# Patient Record
Sex: FEMALE | Race: WHITE | Employment: OTHER | URBAN - METROPOLITAN AREA
[De-identification: names, ages, dates, MRNs, and addresses within clinical notes are randomized per-mention and may not be internally consistent; named-entity substitution may affect disease eponyms.]

---

## 2017-04-10 ENCOUNTER — HOSPITAL ENCOUNTER (OUTPATIENT)
Age: 72
Setting detail: SPECIMEN
Discharge: HOME | End: 2017-04-10
Payer: MEDICARE

## 2017-04-10 ENCOUNTER — OFFICE VISIT (OUTPATIENT)
Dept: INTERNAL MEDICINE | Age: 72
End: 2017-04-10

## 2017-04-10 VITALS
HEART RATE: 90 BPM | BODY MASS INDEX: 47.09 KG/M2 | DIASTOLIC BLOOD PRESSURE: 88 MMHG | HEIGHT: 66 IN | SYSTOLIC BLOOD PRESSURE: 138 MMHG | WEIGHT: 293 LBS

## 2017-04-10 DIAGNOSIS — I10 ESSENTIAL HYPERTENSION: ICD-10-CM

## 2017-04-10 DIAGNOSIS — E55.9 VITAMIN D DEFICIENCY: ICD-10-CM

## 2017-04-10 DIAGNOSIS — I10 ESSENTIAL HYPERTENSION: Primary | ICD-10-CM

## 2017-04-10 DIAGNOSIS — E78.5 DYSLIPIDEMIA: ICD-10-CM

## 2017-04-10 LAB
ALBUMIN SERPL-MCNC: 4.4 G/DL (ref 3.9–4.9)
ALP BLD-CCNC: 74 U/L (ref 40–130)
ALT SERPL-CCNC: 53 U/L (ref 0–33)
ANION GAP SERPL CALCULATED.3IONS-SCNC: 16 MEQ/L (ref 7–13)
AST SERPL-CCNC: 55 U/L (ref 0–35)
BASOPHILS ABSOLUTE: 0.1 K/UL (ref 0–0.2)
BASOPHILS RELATIVE PERCENT: 0.6 %
BILIRUB SERPL-MCNC: 0.7 MG/DL (ref 0–1.2)
BUN BLDV-MCNC: 32 MG/DL (ref 8–23)
CALCIUM SERPL-MCNC: 10.2 MG/DL (ref 8.6–10.2)
CHLORIDE BLD-SCNC: 102 MEQ/L (ref 98–107)
CHOLESTEROL, TOTAL: 243 MG/DL (ref 0–199)
CO2: 23 MEQ/L (ref 22–29)
CREAT SERPL-MCNC: 1.18 MG/DL (ref 0.5–0.9)
EOSINOPHILS ABSOLUTE: 0.2 K/UL (ref 0–0.7)
EOSINOPHILS RELATIVE PERCENT: 1.5 %
GFR AFRICAN AMERICAN: 54.5
GFR NON-AFRICAN AMERICAN: 45
GLOBULIN: 3.1 G/DL (ref 2.3–3.5)
GLUCOSE BLD-MCNC: 81 MG/DL (ref 74–109)
HCT VFR BLD CALC: 47.4 % (ref 37–47)
HDLC SERPL-MCNC: 68 MG/DL (ref 40–59)
HEMOGLOBIN: 15.3 G/DL (ref 12–16)
LDL CHOLESTEROL CALCULATED: 132 MG/DL (ref 0–129)
LYMPHOCYTES ABSOLUTE: 1.9 K/UL (ref 1–4.8)
LYMPHOCYTES RELATIVE PERCENT: 18.6 %
MCH RBC QN AUTO: 32.5 PG (ref 27–31.3)
MCHC RBC AUTO-ENTMCNC: 32.3 % (ref 33–37)
MCV RBC AUTO: 100.5 FL (ref 82–100)
MONOCYTES ABSOLUTE: 0.7 K/UL (ref 0.2–0.8)
MONOCYTES RELATIVE PERCENT: 7.2 %
NEUTROPHILS ABSOLUTE: 7.4 K/UL (ref 1.4–6.5)
NEUTROPHILS RELATIVE PERCENT: 72.1 %
PDW BLD-RTO: 13.7 % (ref 11.5–14.5)
PLATELET # BLD: 232 K/UL (ref 130–400)
POTASSIUM SERPL-SCNC: 4.8 MEQ/L (ref 3.5–5.1)
RBC # BLD: 4.71 M/UL (ref 4.2–5.4)
SLIDE REVIEW: ABNORMAL
SODIUM BLD-SCNC: 141 MEQ/L (ref 132–144)
TOTAL PROTEIN: 7.5 G/DL (ref 6.4–8.1)
TRIGL SERPL-MCNC: 217 MG/DL (ref 0–200)
VITAMIN D 25-HYDROXY: 28.1 NG/ML (ref 30–100)
WBC # BLD: 10.3 K/UL (ref 4.8–10.8)

## 2017-04-10 PROCEDURE — 82306 VITAMIN D 25 HYDROXY: CPT

## 2017-04-10 PROCEDURE — G8399 PT W/DXA RESULTS DOCUMENT: HCPCS | Performed by: PHYSICIAN ASSISTANT

## 2017-04-10 PROCEDURE — 1090F PRES/ABSN URINE INCON ASSESS: CPT | Performed by: PHYSICIAN ASSISTANT

## 2017-04-10 PROCEDURE — 80053 COMPREHEN METABOLIC PANEL: CPT

## 2017-04-10 PROCEDURE — 4040F PNEUMOC VAC/ADMIN/RCVD: CPT | Performed by: PHYSICIAN ASSISTANT

## 2017-04-10 PROCEDURE — 85025 COMPLETE CBC W/AUTO DIFF WBC: CPT

## 2017-04-10 PROCEDURE — 1123F ACP DISCUSS/DSCN MKR DOCD: CPT | Performed by: PHYSICIAN ASSISTANT

## 2017-04-10 PROCEDURE — 3014F SCREEN MAMMO DOC REV: CPT | Performed by: PHYSICIAN ASSISTANT

## 2017-04-10 PROCEDURE — 99213 OFFICE O/P EST LOW 20 MIN: CPT | Performed by: PHYSICIAN ASSISTANT

## 2017-04-10 PROCEDURE — 80061 LIPID PANEL: CPT

## 2017-04-10 PROCEDURE — G8427 DOCREV CUR MEDS BY ELIG CLIN: HCPCS | Performed by: PHYSICIAN ASSISTANT

## 2017-04-10 PROCEDURE — 3017F COLORECTAL CA SCREEN DOC REV: CPT | Performed by: PHYSICIAN ASSISTANT

## 2017-04-10 PROCEDURE — 1036F TOBACCO NON-USER: CPT | Performed by: PHYSICIAN ASSISTANT

## 2017-04-10 PROCEDURE — G8417 CALC BMI ABV UP PARAM F/U: HCPCS | Performed by: PHYSICIAN ASSISTANT

## 2017-04-10 ASSESSMENT — ENCOUNTER SYMPTOMS
RHINORRHEA: 1
COUGH: 0
ABDOMINAL PAIN: 0
SHORTNESS OF BREATH: 0

## 2017-05-03 ENCOUNTER — TELEPHONE (OUTPATIENT)
Dept: FAMILY MEDICINE CLINIC | Age: 72
End: 2017-05-03

## 2017-05-16 ENCOUNTER — TELEPHONE (OUTPATIENT)
Dept: INTERNAL MEDICINE | Age: 72
End: 2017-05-16

## 2017-05-26 ENCOUNTER — APPOINTMENT (OUTPATIENT)
Dept: GENERAL RADIOLOGY | Age: 72
End: 2017-05-26
Payer: MEDICARE

## 2017-05-26 ENCOUNTER — HOSPITAL ENCOUNTER (EMERGENCY)
Age: 72
Discharge: HOME | End: 2017-05-26
Attending: EMERGENCY MEDICINE
Payer: MEDICARE

## 2017-05-26 VITALS
RESPIRATION RATE: 16 BRPM | HEIGHT: 66 IN | SYSTOLIC BLOOD PRESSURE: 188 MMHG | OXYGEN SATURATION: 98 % | TEMPERATURE: 97.7 F | BODY MASS INDEX: 47.09 KG/M2 | DIASTOLIC BLOOD PRESSURE: 80 MMHG | WEIGHT: 293 LBS | HEART RATE: 77 BPM

## 2017-05-26 DIAGNOSIS — S92.345A CLOSED NONDISPLACED FRACTURE OF FOURTH METATARSAL BONE OF LEFT FOOT, INITIAL ENCOUNTER: Primary | ICD-10-CM

## 2017-05-26 PROCEDURE — 73630 X-RAY EXAM OF FOOT: CPT

## 2017-05-26 PROCEDURE — 99283 EMERGENCY DEPT VISIT LOW MDM: CPT

## 2017-05-26 RX ORDER — TRAMADOL HYDROCHLORIDE 50 MG/1
50 TABLET ORAL EVERY 8 HOURS PRN
Qty: 20 TABLET | Refills: 0 | Status: SHIPPED | OUTPATIENT
Start: 2017-05-26 | End: 2017-06-05

## 2017-05-26 ASSESSMENT — ENCOUNTER SYMPTOMS
CHOKING: 0
SORE THROAT: 0
EYE REDNESS: 0
VOICE CHANGE: 0
BLOOD IN STOOL: 0
STRIDOR: 0
CONSTIPATION: 0
TROUBLE SWALLOWING: 0
WHEEZING: 0
DIARRHEA: 0
BACK PAIN: 0
EYE DISCHARGE: 0
SINUS PRESSURE: 0
SHORTNESS OF BREATH: 0
CHEST TIGHTNESS: 0
ABDOMINAL PAIN: 0
VOMITING: 0
FACIAL SWELLING: 0
EYE PAIN: 0
COUGH: 0

## 2017-05-26 ASSESSMENT — PAIN DESCRIPTION - FREQUENCY: FREQUENCY: CONTINUOUS

## 2017-05-26 ASSESSMENT — PAIN DESCRIPTION - ONSET: ONSET: SUDDEN

## 2017-05-26 ASSESSMENT — PAIN DESCRIPTION - LOCATION: LOCATION: FOOT

## 2017-05-26 ASSESSMENT — PAIN DESCRIPTION - ORIENTATION: ORIENTATION: LEFT

## 2017-05-26 ASSESSMENT — PAIN SCALES - GENERAL: PAINLEVEL_OUTOF10: 9

## 2017-12-19 ENCOUNTER — TELEPHONE (OUTPATIENT)
Dept: ADMINISTRATIVE | Age: 72
End: 2017-12-19

## 2018-01-02 RX ORDER — LEVALBUTEROL TARTRATE 45 MCG
HFA AEROSOL WITH ADAPTER (GRAM) INHALATION
Qty: 30 G | OUTPATIENT
Start: 2018-01-02

## 2018-08-07 ENCOUNTER — HOSPITAL ENCOUNTER (OUTPATIENT)
Dept: LAB | Age: 73
Discharge: HOME | End: 2018-08-07
Payer: MEDICARE

## 2018-08-07 LAB
ALBUMIN SERPL-MCNC: 4.1 G/DL (ref 3.9–4.9)
ANION GAP SERPL CALCULATED.3IONS-SCNC: 16 MEQ/L (ref 7–13)
BACTERIA: NORMAL /HPF
BILIRUBIN URINE: NEGATIVE
BLOOD, URINE: NEGATIVE
BUN BLDV-MCNC: 33 MG/DL (ref 8–23)
CALCIUM SERPL-MCNC: 9.8 MG/DL (ref 8.6–10.2)
CHLORIDE BLD-SCNC: 98 MEQ/L (ref 98–107)
CLARITY: CLEAR
CO2: 23 MEQ/L (ref 22–29)
COLOR: YELLOW
CREAT SERPL-MCNC: 1.31 MG/DL (ref 0.5–0.9)
CREATININE URINE: 79.5 MG/DL
GFR AFRICAN AMERICAN: 48.1
GFR NON-AFRICAN AMERICAN: 39.7
GLUCOSE BLD-MCNC: 93 MG/DL (ref 74–109)
GLUCOSE URINE: NEGATIVE MG/DL
KETONES, URINE: NEGATIVE MG/DL
LEUKOCYTE ESTERASE, URINE: ABNORMAL
NITRITE, URINE: NEGATIVE
PARATHYROID HORMONE INTACT: 40.4 PG/ML (ref 15–65)
PH UA: 6 (ref 5–9)
PHOSPHORUS: 4 MG/DL (ref 2.5–4.5)
POTASSIUM SERPL-SCNC: 4.6 MEQ/L (ref 3.5–5.1)
PROTEIN PROTEIN: 17 MG/DL
PROTEIN UA: NEGATIVE MG/DL
PROTEIN/CREAT RATIO: 0.2 ML/ML
PROTEIN/CREAT RATIO: 0.2 ML/ML (ref 0–0.2)
RBC UA: NORMAL /HPF (ref 0–2)
SODIUM BLD-SCNC: 137 MEQ/L (ref 132–144)
SPECIFIC GRAVITY UA: 1.01 (ref 1–1.03)
UROBILINOGEN, URINE: 0.2 E.U./DL
VITAMIN D 25-HYDROXY: 27.3 NG/ML (ref 30–100)
WBC UA: NORMAL /HPF (ref 0–5)

## 2018-08-07 PROCEDURE — 84156 ASSAY OF PROTEIN URINE: CPT

## 2018-08-07 PROCEDURE — 82306 VITAMIN D 25 HYDROXY: CPT

## 2018-08-07 PROCEDURE — 81001 URINALYSIS AUTO W/SCOPE: CPT

## 2018-08-07 PROCEDURE — 80069 RENAL FUNCTION PANEL: CPT

## 2018-08-07 PROCEDURE — 36415 COLL VENOUS BLD VENIPUNCTURE: CPT

## 2018-08-07 PROCEDURE — 83970 ASSAY OF PARATHORMONE: CPT

## 2019-03-29 ENCOUNTER — HOSPITAL ENCOUNTER (EMERGENCY)
Age: 74
Discharge: HOME | End: 2019-03-29
Attending: EMERGENCY MEDICINE
Payer: COMMERCIAL

## 2019-03-29 ENCOUNTER — APPOINTMENT (OUTPATIENT)
Dept: GENERAL RADIOLOGY | Age: 74
End: 2019-03-29
Payer: COMMERCIAL

## 2019-03-29 VITALS
SYSTOLIC BLOOD PRESSURE: 144 MMHG | HEART RATE: 84 BPM | DIASTOLIC BLOOD PRESSURE: 72 MMHG | TEMPERATURE: 98.1 F | OXYGEN SATURATION: 96 % | RESPIRATION RATE: 18 BRPM | BODY MASS INDEX: 45 KG/M2 | HEIGHT: 66 IN | WEIGHT: 280 LBS

## 2019-03-29 DIAGNOSIS — R11.2 NON-INTRACTABLE VOMITING WITH NAUSEA, UNSPECIFIED VOMITING TYPE: ICD-10-CM

## 2019-03-29 DIAGNOSIS — J10.1 INFLUENZA A: Primary | ICD-10-CM

## 2019-03-29 LAB
ALBUMIN SERPL-MCNC: 4.2 G/DL (ref 3.5–4.6)
ALP BLD-CCNC: 63 U/L (ref 40–130)
ALT SERPL-CCNC: 34 U/L (ref 0–33)
ANION GAP SERPL CALCULATED.3IONS-SCNC: 19 MEQ/L (ref 9–15)
AST SERPL-CCNC: 32 U/L (ref 0–35)
BASOPHILS ABSOLUTE: 0 K/UL (ref 0–0.2)
BASOPHILS RELATIVE PERCENT: 0.2 %
BILIRUB SERPL-MCNC: 0.9 MG/DL (ref 0.2–0.7)
BUN BLDV-MCNC: 16 MG/DL (ref 8–23)
CALCIUM SERPL-MCNC: 9.7 MG/DL (ref 8.5–9.9)
CHLORIDE BLD-SCNC: 92 MEQ/L (ref 95–107)
CO2: 22 MEQ/L (ref 20–31)
CREAT SERPL-MCNC: 1.07 MG/DL (ref 0.5–0.9)
EOSINOPHILS ABSOLUTE: 0 K/UL (ref 0–0.7)
EOSINOPHILS RELATIVE PERCENT: 0 %
GFR AFRICAN AMERICAN: >60
GFR NON-AFRICAN AMERICAN: 50.1
GLOBULIN: 3.3 G/DL (ref 2.3–3.5)
GLUCOSE BLD-MCNC: 160 MG/DL (ref 70–99)
HCT VFR BLD CALC: 39.9 % (ref 37–47)
HEMOGLOBIN: 13.6 G/DL (ref 12–16)
LYMPHOCYTES ABSOLUTE: 0.6 K/UL (ref 1–4.8)
LYMPHOCYTES RELATIVE PERCENT: 5.3 %
MCH RBC QN AUTO: 32.5 PG (ref 27–31.3)
MCHC RBC AUTO-ENTMCNC: 34.2 % (ref 33–37)
MCV RBC AUTO: 94.9 FL (ref 82–100)
MONOCYTES ABSOLUTE: 0.5 K/UL (ref 0.2–0.8)
MONOCYTES RELATIVE PERCENT: 4.7 %
NEUTROPHILS ABSOLUTE: 9.9 K/UL (ref 1.4–6.5)
NEUTROPHILS RELATIVE PERCENT: 89.8 %
PDW BLD-RTO: 12.6 % (ref 11.5–14.5)
PLATELET # BLD: 152 K/UL (ref 130–400)
POTASSIUM SERPL-SCNC: 3.3 MEQ/L (ref 3.4–4.9)
RAPID INFLUENZA  B AGN: NEGATIVE
RAPID INFLUENZA A AGN: POSITIVE
RBC # BLD: 4.2 M/UL (ref 4.2–5.4)
SODIUM BLD-SCNC: 133 MEQ/L (ref 135–144)
TOTAL PROTEIN: 7.5 G/DL (ref 6.3–8)
WBC # BLD: 11.1 K/UL (ref 4.8–10.8)

## 2019-03-29 PROCEDURE — 85025 COMPLETE CBC W/AUTO DIFF WBC: CPT

## 2019-03-29 PROCEDURE — 80053 COMPREHEN METABOLIC PANEL: CPT

## 2019-03-29 PROCEDURE — 2580000003 HC RX 258: Performed by: EMERGENCY MEDICINE

## 2019-03-29 PROCEDURE — 87804 INFLUENZA ASSAY W/OPTIC: CPT

## 2019-03-29 PROCEDURE — 6360000002 HC RX W HCPCS: Performed by: EMERGENCY MEDICINE

## 2019-03-29 PROCEDURE — 36415 COLL VENOUS BLD VENIPUNCTURE: CPT

## 2019-03-29 PROCEDURE — 96374 THER/PROPH/DIAG INJ IV PUSH: CPT

## 2019-03-29 PROCEDURE — 71046 X-RAY EXAM CHEST 2 VIEWS: CPT

## 2019-03-29 PROCEDURE — 99283 EMERGENCY DEPT VISIT LOW MDM: CPT

## 2019-03-29 PROCEDURE — 6370000000 HC RX 637 (ALT 250 FOR IP): Performed by: EMERGENCY MEDICINE

## 2019-03-29 RX ORDER — 0.9 % SODIUM CHLORIDE 0.9 %
1000 INTRAVENOUS SOLUTION INTRAVENOUS ONCE
Status: COMPLETED | OUTPATIENT
Start: 2019-03-29 | End: 2019-03-29

## 2019-03-29 RX ORDER — BENZONATATE 100 MG/1
100 CAPSULE ORAL 3 TIMES DAILY PRN
COMMUNITY
End: 2019-12-01

## 2019-03-29 RX ORDER — ONDANSETRON 4 MG/1
4 TABLET, ORALLY DISINTEGRATING ORAL 3 TIMES DAILY PRN
Qty: 12 TABLET | Refills: 0 | Status: SHIPPED | OUTPATIENT
Start: 2019-03-29 | End: 2019-04-05 | Stop reason: SDUPTHER

## 2019-03-29 RX ORDER — ACETAMINOPHEN 500 MG
1000 TABLET ORAL ONCE
Status: COMPLETED | OUTPATIENT
Start: 2019-03-29 | End: 2019-03-29

## 2019-03-29 RX ORDER — ONDANSETRON 2 MG/ML
4 INJECTION INTRAMUSCULAR; INTRAVENOUS ONCE
Status: COMPLETED | OUTPATIENT
Start: 2019-03-29 | End: 2019-03-29

## 2019-03-29 RX ADMIN — ONDANSETRON 4 MG: 2 INJECTION INTRAMUSCULAR; INTRAVENOUS at 20:55

## 2019-03-29 RX ADMIN — SODIUM CHLORIDE 1000 ML: 9 INJECTION, SOLUTION INTRAVENOUS at 20:55

## 2019-03-29 RX ADMIN — ACETAMINOPHEN 1000 MG: 500 TABLET ORAL at 22:45

## 2019-03-29 ASSESSMENT — ENCOUNTER SYMPTOMS
NAUSEA: 1
VOMITING: 1
COUGH: 1
ABDOMINAL PAIN: 0
DIARRHEA: 0

## 2019-03-29 ASSESSMENT — PAIN SCALES - GENERAL
PAINLEVEL_OUTOF10: 0
PAINLEVEL_OUTOF10: 4

## 2019-03-30 NOTE — ED PROVIDER NOTES
[facility] ED  eMERGENCY dEPARTMENT eNCOUnter      Pt Name: Nancy Silva  MRN: [id number]  [place] 1945  Date of evaluation: 3/29/2019  Provider: Samuel Reynolds MD    [address] [place] [address]       Chief Complaint   Patient presents with    Nausea     today    Emesis     today    Cough     since Weds         HISTORY OF PRESENT ILLNESS   (Location/Symptom, Timing/Onset,Context/Setting, Quality, Duration, Modifying Factors, Severity)  Note limiting factors. Nancy Silva is a 76 y.o. female who presents to the emergency department with onset of cough 2 days ago. She saw her physician was given Tessalon prescription and following that the next day she started with nausea and vomiting. No abdominal pain. No chest pain. No fever. Some nasal congestion. No diarrhea. This mostly dry but occasionally productive of small amount of yellowish material.  No ill exposures. The history is provided by the patient. NursingNotes were reviewed. REVIEW OF SYSTEMS    (2-9 systems for level 4, 10 or more for level 5)     Review of Systems   Constitutional: Negative for fever. HENT: Positive for congestion. Respiratory: Positive for cough. Cardiovascular: Negative for chest pain. Gastrointestinal: Positive for nausea and vomiting. Negative for abdominal pain and diarrhea. Genitourinary: Negative for dysuria. Skin: Negative for rash. Allergic/Immunologic: Positive for environmental allergies. Psychiatric/Behavioral: The patient is nervous/anxious. Except as noted above the remainder of the review of systems was reviewed and negative.        PAST MEDICAL HISTORY     Past Medical History:   Diagnosis Date    Allergic rhinitis 9/18/2013    Anxiety 9/18/2013    Asthma     Carpal tunnel syndrome 5/19/2015    Cellulitis of toe, right 6/5/2012    Cholelithiasis, common bile duct 1/10/2012    CKD (chronic kidney disease) stage 3, GFR 30-59 ml/min ([facility]) 9/18/2013    Depression     Dyslipidemia 11/26/2011    GERD (gastroesophageal reflux disease)     Gout 10/24/2011    HTN (hypertension) 10/24/2011    Hypertension     Irritable bowel syndrome 11/26/2011    Microscopic hematuria 12/13/2012    Midsternal chest pain     Migraine headache 2/22/2012    Multiple-type hyperlipidemia 5/19/2015    Muscle spasm 10/24/2011    Obesity, Class III, BMI 40-49.9 (morbid obesity) ([facility] [address] 11/26/2011    JARON (obstructive sleep apnea) 9/18/2013    Osteoarthritis     Other activity(E029.9)     sensative to vicryl sutures    Palpitations     Polyarthralgia 10/24/2011    Screening mammogram     Skin cancer 10/10/2016    Splenic mass 12/13/2012         SURGICALHISTORY       Past Surgical History:   Procedure Laterality Date    BUNIONECTOMY      removal    CARDIOVASCULAR STRESS TEST      2011 normal    CHOLECYSTECTOMY      HYSTERECTOMY      SINUS SURGERY      TONSILLECTOMY           CURRENT MEDICATIONS       Previous Medications    AMLODIPINE (NORVASC) 5 MG TABLET        ASCORBIC ACID (VITAMIN C) 500 MG CAPS    Take 1 tablet by mouth 2 times daily     ASPIRIN 81 MG TABLET    Take 81 mg by mouth daily. BENZONATATE (TESSALON) 100 MG CAPSULE    Take 100 mg by mouth 3 times daily as needed for Cough    CARVEDILOL (COREG) 6.25 MG TABLET        CETIRIZINE (ZYRTEC) 10 MG TABLET    Take 1 tablet by mouth daily    CITALOPRAM (CELEXA) 20 MG TABLET    TAKE 1 TABLET BY MOUTH DAILY    COLCHICINE (COLCRYS) 0.6 MG TABLET    Take 0.6 mg by mouth every other day     FEBUXOSTAT 40 MG TABS TABLET    Take 40 mg by mouth daily    FERROUS SULFATE 325 (65 FE) MG TABLET    Take 325 mg by mouth daily (with breakfast). FLUTICASONE (FLONASE) 50 MCG/ACT NASAL SPRAY    use 2 (TWO) sprays nasally daily AS DIRECTED.     FLUTICASONE-SALMETEROL (ADVAIR DISKUS) 500-50 MCG/DOSE DISKUS INHALER    Inhale 1 puff into the lungs 2 times daily    GLUCOSAMINE-CHONDROITIN 500-400 MG CAPS    Take 1 capsule by mouth daily.    LORAZEPAM (ATIVAN) 1 MG TABLET    Take 1 tablet by mouth every 6 hours as needed. LOVASTATIN (MEVACOR) 40 MG TABLET    Take 1 tablet by mouth nightly    MONTELUKAST (SINGULAIR) 10 MG TABLET    Take 1 tablet by mouth nightly    MULTIPLE VITAMINS-MINERALS (SENIOR MULTIVITAMIN PLUS) TABS    Take 1 tablet by mouth daily. NEXIUM 40 MG DELAYED RELEASE CAPSULE    Take 1 capsule by mouth 2 times daily (before meals)    SUMATRIPTAN (IMITREX) 50 MG TABLET    Take 50 mg by mouth once as needed for Migraine    TRAMADOL (ULTRAM) 50 MG TABLET    Take 1 tablet by mouth every 8 hours as needed for Pain (MAY TAKE 2 TABS EVERY 6 HOURS FOR SEVERE PAIN)    VALSARTAN-HYDROCHLOROTHIAZIDE (DIOVAN-HCT) 320-25 MG PER TABLET    Take 1 tablet by mouth daily. XOPENEX HFA 45 MCG/ACT INHALER    Inhale 1-2 puffs into the lungs every 4 hours as needed for Wheezing       ALLERGIES     Augmentin [amoxicillin-pot clavulanate]; Aspartame; Atorvastatin calcium; Carbapenems; Clonidine derivatives; Diclofenac sodium; Doxycycline hyclate; Enalapril; Fexofenadine hydrochloride; Flagyl [metronidazole]; Imipenem; Lisinopril; Macrolides and ketolides; Metronidazole hcl; Nsaids; Oxaprozin;  Oxycodone-acetaminophen; Paroxetine; Sulfa antibiotics; and Tape [adhesive tape]    FAMILY HISTORY       Family History   Problem Relation Age of Onset    Heart Disease Mother     Cancer Father         lung          SOCIAL HISTORY       Social History     Socioeconomic History    Marital status:      Spouse name: None    Number of children: None    Years of education: None    Highest education level: None   Occupational History    None   Social Needs    Financial resource strain: None    Food insecurity:     Worry: None     Inability: None    Transportation needs:     Medical: None     Non-medical: None   Tobacco Use    Smoking status: Former Smoker     Last attempt to quit: 1974     Years since quittin.6    Smokeless tobacco: Never Used   Substance and Sexual Activity    Alcohol use: No     Comment: quit     Drug use: No    Sexual activity: None   Lifestyle    Physical activity:     Days per week: None     Minutes per session: None    Stress: None   Relationships    Social connections:     Talks on phone: None     Gets together: None     Attends Confucianist service: None     Active member of club or organization: None     Attends meetings of clubs or organizations: None     Relationship status: None    Intimate partner violence:     Fear of current or ex partner: None     Emotionally abused: None     Physically abused: None     Forced sexual activity: None   Other Topics Concern    None   Social History Narrative    None       SCREENINGS      @FLOW([id number])@      PHYSICAL EXAM    (up to 7 for level 4, 8 or more for level 5)     ED Triage Vitals [03/29/19 2016]   BP Temp Temp Source Pulse Resp SpO2 Height Weight   (!) 171/81 98.1 °F (36.7 °C) Oral 95 20 96 % 5' 6\" (1.676 m) 280 lb (127 kg)       Physical Exam  This is a 75year-old female without distress. No sinus discharge. Conjunctivae clear. Neck supple. Lungs clear and symmetric. Heart regular rhythm without murmur. Abdomen completely nontender to palpate. Bowel sounds active. No acute leg or ankle edema or signs of phlebitis. No acute joint at the mission. No skin rash. Mood appears normal.  Patient is awake alert and appropriate. Patient moves all extremities symmetrically without focal weakness or sensory deficit. Speech pattern and cranial nerves appear to be intact. No focal neurologic deficit.       DIAGNOSTIC RESULTS     EKG: All EKG's are interpreted by the Emergency Department Physician who either signs or Co-signsthis chart in the absence of a cardiologist.        RADIOLOGY:   Non-plain filmimages such as CT, Ultrasound and MRI are read by the radiologist. Plain radiographic images are visualized and preliminarily interpreted by the emergency

## 2019-04-05 ENCOUNTER — APPOINTMENT (OUTPATIENT)
Dept: GENERAL RADIOLOGY | Age: 74
DRG: 194 | End: 2019-04-05
Payer: COMMERCIAL

## 2019-04-05 ENCOUNTER — HOSPITAL ENCOUNTER (INPATIENT)
Age: 74
LOS: 3 days | Discharge: HOME HEALTH | DRG: 194 | End: 2019-04-08
Attending: EMERGENCY MEDICINE | Admitting: INTERNAL MEDICINE
Payer: COMMERCIAL

## 2019-04-05 DIAGNOSIS — E87.8 ELECTROLYTE IMBALANCE: Primary | ICD-10-CM

## 2019-04-05 DIAGNOSIS — G93.31 POST-INFLUENZA SYNDROME: ICD-10-CM

## 2019-04-05 PROBLEM — J18.9 PNEUMONIA: Status: ACTIVE | Noted: 2019-04-05

## 2019-04-05 LAB
ALBUMIN SERPL-MCNC: 3.7 G/DL (ref 3.5–4.6)
ALP BLD-CCNC: 64 U/L (ref 40–130)
ALT SERPL-CCNC: 38 U/L (ref 0–33)
ANION GAP SERPL CALCULATED.3IONS-SCNC: 16 MEQ/L (ref 9–15)
AST SERPL-CCNC: 37 U/L (ref 0–35)
BACTERIA: NORMAL /HPF
BASOPHILS ABSOLUTE: 0 K/UL (ref 0–0.2)
BASOPHILS RELATIVE PERCENT: 0.3 %
BILIRUB SERPL-MCNC: 0.9 MG/DL (ref 0.2–0.7)
BILIRUBIN URINE: NEGATIVE
BLOOD, URINE: NEGATIVE
BUN BLDV-MCNC: 21 MG/DL (ref 8–23)
CALCIUM SERPL-MCNC: 9.3 MG/DL (ref 8.5–9.9)
CHLORIDE BLD-SCNC: 90 MEQ/L (ref 95–107)
CLARITY: CLEAR
CO2: 25 MEQ/L (ref 20–31)
COLOR: YELLOW
CREAT SERPL-MCNC: 1.15 MG/DL (ref 0.5–0.9)
EOSINOPHILS ABSOLUTE: 0.1 K/UL (ref 0–0.7)
EOSINOPHILS RELATIVE PERCENT: 0.9 %
EPITHELIAL CELLS, UA: NORMAL /HPF
GFR AFRICAN AMERICAN: 55.8
GFR NON-AFRICAN AMERICAN: 46.1
GLOBULIN: 3.7 G/DL (ref 2.3–3.5)
GLUCOSE BLD-MCNC: 109 MG/DL (ref 70–99)
GLUCOSE URINE: NEGATIVE MG/DL
HCT VFR BLD CALC: 40 % (ref 37–47)
HEMOGLOBIN: 14 G/DL (ref 12–16)
KETONES, URINE: NEGATIVE MG/DL
LEUKOCYTE ESTERASE, URINE: NEGATIVE
LYMPHOCYTES ABSOLUTE: 1.4 K/UL (ref 1–4.8)
LYMPHOCYTES RELATIVE PERCENT: 23.8 %
MAGNESIUM: 2.1 MG/DL (ref 1.7–2.4)
MCH RBC QN AUTO: 32.4 PG (ref 27–31.3)
MCHC RBC AUTO-ENTMCNC: 35.1 % (ref 33–37)
MCV RBC AUTO: 92.3 FL (ref 82–100)
MONOCYTES ABSOLUTE: 0.7 K/UL (ref 0.2–0.8)
MONOCYTES RELATIVE PERCENT: 12.7 %
NEUTROPHILS ABSOLUTE: 3.7 K/UL (ref 1.4–6.5)
NEUTROPHILS RELATIVE PERCENT: 62.3 %
NITRITE, URINE: NEGATIVE
PDW BLD-RTO: 12.7 % (ref 11.5–14.5)
PH UA: 7 (ref 5–9)
PLATELET # BLD: 225 K/UL (ref 130–400)
POTASSIUM SERPL-SCNC: 2.8 MEQ/L (ref 3.4–4.9)
PROTEIN UA: 30 MG/DL
RAPID INFLUENZA  B AGN: NEGATIVE
RAPID INFLUENZA A AGN: NEGATIVE
RBC # BLD: 4.33 M/UL (ref 4.2–5.4)
RBC UA: NORMAL /HPF (ref 0–2)
SODIUM BLD-SCNC: 131 MEQ/L (ref 135–144)
SPECIFIC GRAVITY UA: 1.01 (ref 1–1.03)
TOTAL PROTEIN: 7.4 G/DL (ref 6.3–8)
URINE REFLEX TO CULTURE: YES
UROBILINOGEN, URINE: 0.2 E.U./DL
WBC # BLD: 5.9 K/UL (ref 4.8–10.8)
WBC UA: NORMAL /HPF (ref 0–5)

## 2019-04-05 PROCEDURE — 2700000000 HC OXYGEN THERAPY PER DAY

## 2019-04-05 PROCEDURE — 6360000002 HC RX W HCPCS: Performed by: INTERNAL MEDICINE

## 2019-04-05 PROCEDURE — 6370000000 HC RX 637 (ALT 250 FOR IP): Performed by: INTERNAL MEDICINE

## 2019-04-05 PROCEDURE — 81001 URINALYSIS AUTO W/SCOPE: CPT

## 2019-04-05 PROCEDURE — 6370000000 HC RX 637 (ALT 250 FOR IP): Performed by: EMERGENCY MEDICINE

## 2019-04-05 PROCEDURE — 87804 INFLUENZA ASSAY W/OPTIC: CPT

## 2019-04-05 PROCEDURE — 83735 ASSAY OF MAGNESIUM: CPT

## 2019-04-05 PROCEDURE — 71045 X-RAY EXAM CHEST 1 VIEW: CPT

## 2019-04-05 PROCEDURE — 94640 AIRWAY INHALATION TREATMENT: CPT

## 2019-04-05 PROCEDURE — 85025 COMPLETE CBC W/AUTO DIFF WBC: CPT

## 2019-04-05 PROCEDURE — 96374 THER/PROPH/DIAG INJ IV PUSH: CPT

## 2019-04-05 PROCEDURE — 80053 COMPREHEN METABOLIC PANEL: CPT

## 2019-04-05 PROCEDURE — 6360000002 HC RX W HCPCS: Performed by: EMERGENCY MEDICINE

## 2019-04-05 PROCEDURE — 2580000003 HC RX 258: Performed by: EMERGENCY MEDICINE

## 2019-04-05 PROCEDURE — 87086 URINE CULTURE/COLONY COUNT: CPT

## 2019-04-05 PROCEDURE — 94761 N-INVAS EAR/PLS OXIMETRY MLT: CPT

## 2019-04-05 PROCEDURE — 99285 EMERGENCY DEPT VISIT HI MDM: CPT

## 2019-04-05 PROCEDURE — 36415 COLL VENOUS BLD VENIPUNCTURE: CPT

## 2019-04-05 PROCEDURE — 1210000000 HC MED SURG R&B

## 2019-04-05 RX ORDER — LORAZEPAM 1 MG/1
1 TABLET ORAL EVERY 6 HOURS PRN
Status: DISCONTINUED | OUTPATIENT
Start: 2019-04-05 | End: 2019-04-08 | Stop reason: HOSPADM

## 2019-04-05 RX ORDER — COLCHICINE 0.6 MG/1
0.6 TABLET ORAL DAILY
Status: DISCONTINUED | OUTPATIENT
Start: 2019-04-06 | End: 2019-04-08 | Stop reason: HOSPADM

## 2019-04-05 RX ORDER — METHYLPREDNISOLONE SODIUM SUCCINATE 125 MG/2ML
125 INJECTION, POWDER, LYOPHILIZED, FOR SOLUTION INTRAMUSCULAR; INTRAVENOUS ONCE
Status: COMPLETED | OUTPATIENT
Start: 2019-04-05 | End: 2019-04-05

## 2019-04-05 RX ORDER — CARVEDILOL 6.25 MG/1
6.25 TABLET ORAL 2 TIMES DAILY WITH MEALS
Status: DISCONTINUED | OUTPATIENT
Start: 2019-04-05 | End: 2019-04-07

## 2019-04-05 RX ORDER — BENZONATATE 100 MG/1
100 CAPSULE ORAL 3 TIMES DAILY PRN
Status: DISCONTINUED | OUTPATIENT
Start: 2019-04-05 | End: 2019-04-08 | Stop reason: HOSPADM

## 2019-04-05 RX ORDER — SODIUM CHLORIDE 0.9 % (FLUSH) 0.9 %
10 SYRINGE (ML) INJECTION PRN
Status: DISCONTINUED | OUTPATIENT
Start: 2019-04-05 | End: 2019-04-08 | Stop reason: HOSPADM

## 2019-04-05 RX ORDER — ACETAMINOPHEN 325 MG/1
650 TABLET ORAL EVERY 4 HOURS PRN
Status: DISCONTINUED | OUTPATIENT
Start: 2019-04-05 | End: 2019-04-08 | Stop reason: HOSPADM

## 2019-04-05 RX ORDER — CITALOPRAM 20 MG/1
20 TABLET ORAL DAILY
Status: DISCONTINUED | OUTPATIENT
Start: 2019-04-05 | End: 2019-04-08 | Stop reason: HOSPADM

## 2019-04-05 RX ORDER — LEVOFLOXACIN 5 MG/ML
500 INJECTION, SOLUTION INTRAVENOUS EVERY 24 HOURS
Status: DISCONTINUED | OUTPATIENT
Start: 2019-04-05 | End: 2019-04-08 | Stop reason: HOSPADM

## 2019-04-05 RX ORDER — SODIUM CHLORIDE 0.9 % (FLUSH) 0.9 %
10 SYRINGE (ML) INJECTION EVERY 12 HOURS SCHEDULED
Status: DISCONTINUED | OUTPATIENT
Start: 2019-04-05 | End: 2019-04-08 | Stop reason: HOSPADM

## 2019-04-05 RX ORDER — POTASSIUM CHLORIDE 20 MEQ/1
20 TABLET, EXTENDED RELEASE ORAL DAILY
Qty: 10 TABLET | Refills: 0 | Status: SHIPPED | OUTPATIENT
Start: 2019-04-05

## 2019-04-05 RX ORDER — VALSARTAN 80 MG/1
320 TABLET ORAL DAILY
Status: DISCONTINUED | OUTPATIENT
Start: 2019-04-05 | End: 2019-04-08 | Stop reason: HOSPADM

## 2019-04-05 RX ORDER — 0.9 % SODIUM CHLORIDE 0.9 %
1000 INTRAVENOUS SOLUTION INTRAVENOUS ONCE
Status: COMPLETED | OUTPATIENT
Start: 2019-04-05 | End: 2019-04-05

## 2019-04-05 RX ORDER — HYDROCHLOROTHIAZIDE 25 MG/1
25 TABLET ORAL DAILY
Status: DISCONTINUED | OUTPATIENT
Start: 2019-04-05 | End: 2019-04-08 | Stop reason: HOSPADM

## 2019-04-05 RX ORDER — IPRATROPIUM BROMIDE AND ALBUTEROL SULFATE 2.5; .5 MG/3ML; MG/3ML
1 SOLUTION RESPIRATORY (INHALATION) ONCE
Status: COMPLETED | OUTPATIENT
Start: 2019-04-05 | End: 2019-04-05

## 2019-04-05 RX ORDER — TRAMADOL HYDROCHLORIDE 50 MG/1
50 TABLET ORAL EVERY 8 HOURS PRN
Status: DISCONTINUED | OUTPATIENT
Start: 2019-04-05 | End: 2019-04-08 | Stop reason: HOSPADM

## 2019-04-05 RX ORDER — ONDANSETRON 2 MG/ML
4 INJECTION INTRAMUSCULAR; INTRAVENOUS EVERY 6 HOURS PRN
Status: DISCONTINUED | OUTPATIENT
Start: 2019-04-05 | End: 2019-04-08 | Stop reason: HOSPADM

## 2019-04-05 RX ORDER — ONDANSETRON 4 MG/1
4 TABLET, ORALLY DISINTEGRATING ORAL 3 TIMES DAILY PRN
Qty: 20 TABLET | Refills: 0 | Status: SHIPPED | OUTPATIENT
Start: 2019-04-05 | End: 2019-12-01

## 2019-04-05 RX ORDER — ASPIRIN 81 MG/1
81 TABLET, CHEWABLE ORAL DAILY
Status: DISCONTINUED | OUTPATIENT
Start: 2019-04-05 | End: 2019-04-08 | Stop reason: HOSPADM

## 2019-04-05 RX ORDER — MONTELUKAST SODIUM 10 MG/1
10 TABLET ORAL NIGHTLY
Status: DISCONTINUED | OUTPATIENT
Start: 2019-04-05 | End: 2019-04-08 | Stop reason: HOSPADM

## 2019-04-05 RX ORDER — SIMVASTATIN 20 MG
20 TABLET ORAL NIGHTLY
Status: DISCONTINUED | OUTPATIENT
Start: 2019-04-05 | End: 2019-04-08 | Stop reason: HOSPADM

## 2019-04-05 RX ORDER — METHYLPREDNISOLONE SODIUM SUCCINATE 40 MG/ML
40 INJECTION, POWDER, LYOPHILIZED, FOR SOLUTION INTRAMUSCULAR; INTRAVENOUS EVERY 12 HOURS
Status: DISCONTINUED | OUTPATIENT
Start: 2019-04-05 | End: 2019-04-06

## 2019-04-05 RX ORDER — AMLODIPINE BESYLATE 5 MG/1
5 TABLET ORAL DAILY
Status: DISCONTINUED | OUTPATIENT
Start: 2019-04-05 | End: 2019-04-06

## 2019-04-05 RX ORDER — POTASSIUM CHLORIDE 750 MG/1
40 TABLET, EXTENDED RELEASE ORAL ONCE
Status: COMPLETED | OUTPATIENT
Start: 2019-04-05 | End: 2019-04-05

## 2019-04-05 RX ORDER — IPRATROPIUM BROMIDE AND ALBUTEROL SULFATE 2.5; .5 MG/3ML; MG/3ML
1 SOLUTION RESPIRATORY (INHALATION)
Status: DISCONTINUED | OUTPATIENT
Start: 2019-04-05 | End: 2019-04-06

## 2019-04-05 RX ORDER — VALSARTAN AND HYDROCHLOROTHIAZIDE 320; 25 MG/1; MG/1
1 TABLET, FILM COATED ORAL DAILY
Status: DISCONTINUED | OUTPATIENT
Start: 2019-04-05 | End: 2019-04-05 | Stop reason: CLARIF

## 2019-04-05 RX ORDER — POTASSIUM CHLORIDE AND SODIUM CHLORIDE 900; 300 MG/100ML; MG/100ML
INJECTION, SOLUTION INTRAVENOUS CONTINUOUS
Status: DISCONTINUED | OUTPATIENT
Start: 2019-04-05 | End: 2019-04-06

## 2019-04-05 RX ORDER — ALLOPURINOL 100 MG/1
100 TABLET ORAL DAILY
Status: DISCONTINUED | OUTPATIENT
Start: 2019-04-06 | End: 2019-04-08 | Stop reason: HOSPADM

## 2019-04-05 RX ORDER — PANTOPRAZOLE SODIUM 40 MG/1
40 TABLET, DELAYED RELEASE ORAL
Status: DISCONTINUED | OUTPATIENT
Start: 2019-04-06 | End: 2019-04-08 | Stop reason: HOSPADM

## 2019-04-05 RX ORDER — ONDANSETRON 2 MG/ML
4 INJECTION INTRAMUSCULAR; INTRAVENOUS ONCE
Status: COMPLETED | OUTPATIENT
Start: 2019-04-05 | End: 2019-04-05

## 2019-04-05 RX ADMIN — POTASSIUM CHLORIDE AND SODIUM CHLORIDE: 900; 300 INJECTION, SOLUTION INTRAVENOUS at 16:46

## 2019-04-05 RX ADMIN — METHYLPREDNISOLONE SODIUM SUCCINATE 125 MG: 125 INJECTION, POWDER, FOR SOLUTION INTRAMUSCULAR; INTRAVENOUS at 15:14

## 2019-04-05 RX ADMIN — MONTELUKAST SODIUM 10 MG: 10 TABLET, FILM COATED ORAL at 21:02

## 2019-04-05 RX ADMIN — MOMETASONE FUROATE AND FORMOTEROL FUMARATE DIHYDRATE 2 PUFF: 100; 5 AEROSOL RESPIRATORY (INHALATION) at 19:47

## 2019-04-05 RX ADMIN — IPRATROPIUM BROMIDE AND ALBUTEROL SULFATE 1 AMPULE: .5; 3 SOLUTION RESPIRATORY (INHALATION) at 19:47

## 2019-04-05 RX ADMIN — ONDANSETRON 4 MG: 2 INJECTION INTRAMUSCULAR; INTRAVENOUS at 13:19

## 2019-04-05 RX ADMIN — IPRATROPIUM BROMIDE AND ALBUTEROL SULFATE 1 AMPULE: .5; 3 SOLUTION RESPIRATORY (INHALATION) at 15:13

## 2019-04-05 RX ADMIN — SIMVASTATIN 20 MG: 20 TABLET, FILM COATED ORAL at 21:02

## 2019-04-05 RX ADMIN — SODIUM CHLORIDE 1000 ML: 9 INJECTION, SOLUTION INTRAVENOUS at 13:19

## 2019-04-05 RX ADMIN — LEVOFLOXACIN 500 MG: 5 INJECTION, SOLUTION INTRAVENOUS at 16:42

## 2019-04-05 RX ADMIN — ENOXAPARIN SODIUM 40 MG: 40 INJECTION SUBCUTANEOUS at 21:04

## 2019-04-05 RX ADMIN — POTASSIUM CHLORIDE 40 MEQ: 10 TABLET, EXTENDED RELEASE ORAL at 13:47

## 2019-04-05 ASSESSMENT — ENCOUNTER SYMPTOMS
ABDOMINAL DISTENTION: 0
SINUS PRESSURE: 0
WHEEZING: 0
PHOTOPHOBIA: 0
VOMITING: 0
SHORTNESS OF BREATH: 0
APNEA: 0
COLOR CHANGE: 0
EYE PAIN: 0
ABDOMINAL PAIN: 0
SORE THROAT: 0
BACK PAIN: 0
COUGH: 1
NAUSEA: 1
CONSTIPATION: 0
RHINORRHEA: 0
DIARRHEA: 1

## 2019-04-05 ASSESSMENT — PAIN SCALES - GENERAL
PAINLEVEL_OUTOF10: 0

## 2019-04-05 NOTE — ED TRIAGE NOTES
Pt from home via EMS for c/o no improvement from diagnosis of flu 1 week ago. . Pt arrived on O2 2L NC for low O2 sat of 89% on RA. Pt given resp treatment enroute by EMS for wheezing.

## 2019-04-05 NOTE — ED NOTES
Pt advised of plan of care with pending admit. Lunch tray ordered for Pt.      Niesha Mathew RN  04/05/19 9743

## 2019-04-05 NOTE — ED PROVIDER NOTES
[address] [place] [address] ED  eMERGENCY dEPARTMENT eNCOUnter      Pt Name: Saad Kuo  MRN: [id number]  [place] 1945  Date of evaluation: 4/5/2019  Provider: Candido Colunga MD    CHIEF COMPLAINT       Chief Complaint   Patient presents with    Other     Pt diagnosed with flu 1 week ago, not improving, SOB, weak, nausea, diarhea, cough, wheezing, lightheaded         HISTORY OF PRESENT ILLNESS   (Location/Symptom, Timing/Onset,Context/Setting, Quality, Duration, Modifying Factors, Severity)  Note limiting factors. Saad Kuo is a 76 y.o. female who presents to the emergency department with complaint of not feeling well, nausea, cough and chest congestion, lightheadedness, diarrhea. Was diagnosed with influenza last week. Not eating as she lives alone and does not have enough energy to cook. Denies fever or chills. Denies chest pain, palpitations, orthopnea or PND. HPI    Nursing Notes were reviewed. REVIEW OF SYSTEMS    (2-9 systems for level 4, 10 or more for level 5)     Review of Systems   Constitutional: Positive for appetite change and fatigue. Negative for activity change, chills and fever. HENT: Positive for congestion. Negative for ear discharge, ear pain, hearing loss, rhinorrhea, sinus pressure and sore throat. Eyes: Negative for photophobia, pain and visual disturbance. Respiratory: Positive for cough. Negative for apnea, shortness of breath and wheezing. Cardiovascular: Negative for chest pain, palpitations and leg swelling. Gastrointestinal: Positive for diarrhea and nausea. Negative for abdominal distention, abdominal pain, constipation and vomiting. Endocrine: Negative for cold intolerance, heat intolerance and polyuria. Genitourinary: Negative for dysuria, flank pain, frequency and urgency. Musculoskeletal: Negative for arthralgias, back pain, gait problem, myalgias and neck stiffness. Skin: Negative for color change, pallor and rash. Allergic/Immunologic: Negative for food allergies and immunocompromised state. Neurological: Positive for dizziness and weakness. Negative for tremors, syncope, light-headedness and headaches. Psychiatric/Behavioral: Negative for agitation, confusion and hallucinations. All other systems reviewed and are negative. Except as noted above the remainder of the review of systems was reviewed and negative. PAST MEDICAL HISTORY     Past Medical History:   Diagnosis Date    Allergic rhinitis 9/18/2013    Anxiety 9/18/2013    Asthma     Carpal tunnel syndrome 5/19/2015    Cellulitis of toe, right 6/5/2012    Cholelithiasis, common bile duct 1/10/2012    CKD (chronic kidney disease) stage 3, GFR 30-59 ml/min ([facility]) 9/18/2013    Depression     Dyslipidemia 11/26/2011    GERD (gastroesophageal reflux disease)     Gout 10/24/2011    HTN (hypertension) 10/24/2011    Hypertension     Irritable bowel syndrome 11/26/2011    Microscopic hematuria 12/13/2012    Midsternal chest pain     Migraine headache 2/22/2012    Multiple-type hyperlipidemia 5/19/2015    Muscle spasm 10/24/2011    Obesity, Class III, BMI 40-49.9 (morbid obesity) ([facility] [address] 11/26/2011    JARON (obstructive sleep apnea) 9/18/2013    Osteoarthritis     Other activity(E029.9)     sensative to vicryl sutures    Palpitations     Polyarthralgia 10/24/2011    Screening mammogram     Skin cancer 10/10/2016    Splenic mass 12/13/2012         SURGICAL HISTORY       Past Surgical History:   Procedure Laterality Date    BUNIONECTOMY      removal    CARDIOVASCULAR STRESS TEST      2011 normal    CHOLECYSTECTOMY      HYSTERECTOMY      SINUS SURGERY      TONSILLECTOMY           CURRENT MEDICATIONS       Previous Medications    AMLODIPINE (NORVASC) 5 MG TABLET        ASCORBIC ACID (VITAMIN C) 500 MG CAPS    Take 1 tablet by mouth 2 times daily     ASPIRIN 81 MG TABLET    Take 81 mg by mouth daily.     BENZONATATE (TESSALON) 100 MG Oxycodone-acetaminophen; Paroxetine; Sulfa antibiotics; and Tape [adhesive tape]    FAMILY HISTORY       Family History   Problem Relation Age of Onset    Heart Disease Mother     Cancer Father         lung          SOCIAL HISTORY       Social History     Socioeconomic History    Marital status:      Spouse name: None    Number of children: None    Years of education: None    Highest education level: None   Occupational History    None   Social Needs    Financial resource strain: None    Food insecurity:     Worry: None     Inability: None    Transportation needs:     Medical: None     Non-medical: None   Tobacco Use    Smoking status: Former Smoker     Last attempt to quit: 1974     Years since quittin.7    Smokeless tobacco: Never Used   Substance and Sexual Activity    Alcohol use: No     Comment: quit     Drug use: No    Sexual activity: None   Lifestyle    Physical activity:     Days per week: None     Minutes per session: None    Stress: None   Relationships    Social connections:     Talks on phone: None     Gets together: None     Attends Sabianist service: None     Active member of club or organization: None     Attends meetings of clubs or organizations: None     Relationship status: None    Intimate partner violence:     Fear of current or ex partner: None     Emotionally abused: None     Physically abused: None     Forced sexual activity: None   Other Topics Concern    None   Social History Narrative    None       SCREENINGS             PHYSICAL EXAM    (up to 7 for level 4, 8 or more for level 5)     ED Triage Vitals [19 1243]   BP Temp Temp Source Pulse Resp SpO2 Height Weight   (!) 175/70 98.6 °F (37 °C) Oral 74 20 96 % 5' 6\" (1.676 m) 285 lb (129.3 kg)       Physical Exam   Constitutional: She is oriented to person, place, and time. She appears well-developed and well-nourished. No distress. HENT:   Head: Normocephalic and atraumatic.    Nose: Nose ED BEDSIDE ULTRASOUND:   Performed by ED Physician - none    LABS:  Labs Reviewed   COMPREHENSIVE METABOLIC PANEL - Abnormal; Notable for the following components:       Result Value    Sodium 131 (*)     Potassium 2.8 (*)     Chloride 90 (*)     Anion Gap 16 (*)     Glucose 109 (*)     CREATININE 1.15 (*)     GFR Non- 46.1 (*)     GFR  55.8 (*)     Total Bilirubin 0.9 (*)     ALT 38 (*)     AST 37 (*)     Globulin 3.7 (*)     All other components within normal limits    Narrative:     CALL  Mejia  DEMARCO tel. [phone],  Chemistry results called to and read back by Kaur 04/05/2019 13:30, by [facility]   CBC WITH AUTO DIFFERENTIAL - Abnormal; Notable for the following components:    MCH 32.4 (*)     All other components within normal limits   URINE RT REFLEX TO CULTURE - Abnormal; Notable for the following components:    Protein, UA 30 (*)     All other components within normal limits   RAPID INFLUENZA A/B ANTIGENS   URINE CULTURE   MAGNESIUM    Narrative:     Ion Felder  DEMARCO tel. [phone],  Chemistry results called to and read back by Kaur, 04/05/2019 13:30, by [facility]   MICROSCOPIC URINALYSIS       All other labs were within normal range or not returned as of this dictation.     EMERGENCY DEPARTMENT COURSE and DIFFERENTIALDIAGNOSIS/MDM:   Vitals:    Vitals:    04/05/19 1243   BP: (!) 175/70   Pulse: 74   Resp: 20   Temp: 98.6 °F (37 °C)   TempSrc: Oral   SpO2: 96%   Weight: 285 lb (129.3 kg)   Height: 5' 6\" (1.676 m)           MDM  Number of Diagnoses or Management Options     Amount and/or Complexity of Data Reviewed  Clinical lab tests: ordered and reviewed  Tests in the radiology section of CPT®: ordered and reviewed    Risk of Complications, Morbidity, and/or Mortality  Presenting problems: moderate  Diagnostic procedures: moderate  Management options: moderate    Patient Progress  Patient progress: improved      CRITICAL CARE TIME   Total Critical Care time was  minutes, excluding separately reportable procedures. There was a high probability of clinically significant/life threatening deterioration in the patient's condition which required my urgentintervention. CONSULTS:  None    PROCEDURES:  Unless otherwise noted below, none     Procedures    FINAL IMPRESSION      1. Electrolyte imbalance    2.  Post-influenza syndrome          DISPOSITION/PLAN   DISPOSITION        PATIENT REFERRED TO:  Karin Patel MD  [address] [facility] [id number]    In 3 days        DISCHARGE MEDICATIONS:  New Prescriptions    POTASSIUM CHLORIDE (KLOR-CON M) 20 MEQ EXTENDED RELEASE TABLET    Take 1 tablet by mouth daily          (Please note that portions of this note were completed with a voice recognitionprogram.  Efforts were made to edit the dictations but occasionally words are mis-transcribed.)    Lily Mckee MD (electronically signed)  Attending Emergency Physician          Lily Mckee MD  04/05/19 2562

## 2019-04-05 NOTE — ED NOTES
Bed: 08  Expected date: 4/5/19  Expected time:   Means of arrival:   Comments:  75 y/o female from home via EMS for c/o diagnosed flu 1 week ago, not improving,  nausea and diarrhea, cough and wheezing,no emesis. VS: 139/79, HR 72, 97% after being given resp tx, O2 sat 89% before tx.      Ganga Smith RN  04/05/19 5507

## 2019-04-05 NOTE — PROGRESS NOTES
Pt with listed multiple antibiotic allergies, pt unsure of reactions, states its been a long time since she has had any antibiotics, states they dont give them to me anymore. Pt received dose of iv Levaquin, No s/s of distress noted. no adverse reaction noted. No s/s of iv complications noted. Pt denied c/o. IVF currently infusing of ns with 40 meq of kcl at 100ml/hr as ordered.

## 2019-04-05 NOTE — ED NOTES
Pt c/o SOB while sitting on cart. Pt O2 removed briefly and O2 sat decreased to 88% on room air. Pt then placed back on O2 2L NC and Dr. Aleida Lee advised fo Pt status.        Santiago Del Rosario RN  04/05/19 9630

## 2019-04-05 NOTE — ED NOTES
Pt on bedside commode for urine specimen. Pt remains on O2 2L NC for Sob with movement to commode.      Ender Garcia RN  04/05/19 1245

## 2019-04-06 LAB
ANION GAP SERPL CALCULATED.3IONS-SCNC: 15 MEQ/L (ref 9–15)
BUN BLDV-MCNC: 16 MG/DL (ref 8–23)
CALCIUM SERPL-MCNC: 9.3 MG/DL (ref 8.5–9.9)
CHLORIDE BLD-SCNC: 98 MEQ/L (ref 95–107)
CO2: 23 MEQ/L (ref 20–31)
CREAT SERPL-MCNC: 0.96 MG/DL (ref 0.5–0.9)
GFR AFRICAN AMERICAN: >60
GFR NON-AFRICAN AMERICAN: 56.8
GLUCOSE BLD-MCNC: 145 MG/DL (ref 70–99)
HCT VFR BLD CALC: 37.6 % (ref 37–47)
HEMOGLOBIN: 12.8 G/DL (ref 12–16)
MCH RBC QN AUTO: 31.9 PG (ref 27–31.3)
MCHC RBC AUTO-ENTMCNC: 33.9 % (ref 33–37)
MCV RBC AUTO: 94.1 FL (ref 82–100)
PDW BLD-RTO: 12.5 % (ref 11.5–14.5)
PLATELET # BLD: 214 K/UL (ref 130–400)
POTASSIUM REFLEX MAGNESIUM: 3.7 MEQ/L (ref 3.4–4.9)
RBC # BLD: 4 M/UL (ref 4.2–5.4)
SODIUM BLD-SCNC: 136 MEQ/L (ref 135–144)
WBC # BLD: 6 K/UL (ref 4.8–10.8)

## 2019-04-06 PROCEDURE — 80048 BASIC METABOLIC PNL TOTAL CA: CPT

## 2019-04-06 PROCEDURE — 97161 PT EVAL LOW COMPLEX 20 MIN: CPT

## 2019-04-06 PROCEDURE — 94640 AIRWAY INHALATION TREATMENT: CPT

## 2019-04-06 PROCEDURE — 6370000000 HC RX 637 (ALT 250 FOR IP): Performed by: INTERNAL MEDICINE

## 2019-04-06 PROCEDURE — 6360000002 HC RX W HCPCS: Performed by: INTERNAL MEDICINE

## 2019-04-06 PROCEDURE — 1210000000 HC MED SURG R&B

## 2019-04-06 PROCEDURE — 36415 COLL VENOUS BLD VENIPUNCTURE: CPT

## 2019-04-06 PROCEDURE — 97530 THERAPEUTIC ACTIVITIES: CPT

## 2019-04-06 PROCEDURE — 97162 PT EVAL MOD COMPLEX 30 MIN: CPT

## 2019-04-06 PROCEDURE — 94761 N-INVAS EAR/PLS OXIMETRY MLT: CPT

## 2019-04-06 PROCEDURE — 85027 COMPLETE CBC AUTOMATED: CPT

## 2019-04-06 PROCEDURE — 2580000003 HC RX 258: Performed by: INTERNAL MEDICINE

## 2019-04-06 PROCEDURE — 2700000000 HC OXYGEN THERAPY PER DAY

## 2019-04-06 RX ORDER — AMLODIPINE BESYLATE 10 MG/1
10 TABLET ORAL DAILY
Status: DISCONTINUED | OUTPATIENT
Start: 2019-04-07 | End: 2019-04-08 | Stop reason: HOSPADM

## 2019-04-06 RX ORDER — IPRATROPIUM BROMIDE AND ALBUTEROL SULFATE 2.5; .5 MG/3ML; MG/3ML
1 SOLUTION RESPIRATORY (INHALATION) EVERY 4 HOURS PRN
Status: DISCONTINUED | OUTPATIENT
Start: 2019-04-06 | End: 2019-04-08 | Stop reason: HOSPADM

## 2019-04-06 RX ORDER — AMLODIPINE BESYLATE 5 MG/1
5 TABLET ORAL ONCE
Status: COMPLETED | OUTPATIENT
Start: 2019-04-06 | End: 2019-04-06

## 2019-04-06 RX ORDER — POTASSIUM CHLORIDE 750 MG/1
40 TABLET, EXTENDED RELEASE ORAL ONCE
Status: COMPLETED | OUTPATIENT
Start: 2019-04-06 | End: 2019-04-06

## 2019-04-06 RX ADMIN — AMLODIPINE BESYLATE 5 MG: 5 TABLET ORAL at 15:32

## 2019-04-06 RX ADMIN — ASPIRIN 81 MG 81 MG: 81 TABLET ORAL at 08:52

## 2019-04-06 RX ADMIN — AMLODIPINE BESYLATE 5 MG: 5 TABLET ORAL at 08:54

## 2019-04-06 RX ADMIN — POTASSIUM CHLORIDE AND SODIUM CHLORIDE: 900; 300 INJECTION, SOLUTION INTRAVENOUS at 04:17

## 2019-04-06 RX ADMIN — COLCHICINE 0.6 MG: 0.6 TABLET, FILM COATED ORAL at 08:53

## 2019-04-06 RX ADMIN — METHYLPREDNISOLONE SODIUM SUCCINATE 40 MG: 40 INJECTION, POWDER, FOR SOLUTION INTRAMUSCULAR; INTRAVENOUS at 04:17

## 2019-04-06 RX ADMIN — SIMVASTATIN 20 MG: 20 TABLET, FILM COATED ORAL at 20:58

## 2019-04-06 RX ADMIN — IPRATROPIUM BROMIDE AND ALBUTEROL SULFATE 1 AMPULE: .5; 3 SOLUTION RESPIRATORY (INHALATION) at 10:30

## 2019-04-06 RX ADMIN — IPRATROPIUM BROMIDE AND ALBUTEROL SULFATE 1 AMPULE: .5; 3 SOLUTION RESPIRATORY (INHALATION) at 06:01

## 2019-04-06 RX ADMIN — POTASSIUM CHLORIDE 40 MEQ: 750 TABLET, EXTENDED RELEASE ORAL at 15:32

## 2019-04-06 RX ADMIN — Medication 10 ML: at 20:58

## 2019-04-06 RX ADMIN — LEVOFLOXACIN 500 MG: 5 INJECTION, SOLUTION INTRAVENOUS at 16:59

## 2019-04-06 RX ADMIN — MONTELUKAST SODIUM 10 MG: 10 TABLET, FILM COATED ORAL at 20:58

## 2019-04-06 RX ADMIN — VALSARTAN 320 MG: 80 TABLET, FILM COATED ORAL at 08:52

## 2019-04-06 RX ADMIN — CARVEDILOL 6.25 MG: 6.25 TABLET, FILM COATED ORAL at 17:01

## 2019-04-06 RX ADMIN — HYDROCHLOROTHIAZIDE 25 MG: 25 TABLET ORAL at 08:52

## 2019-04-06 RX ADMIN — MOMETASONE FUROATE AND FORMOTEROL FUMARATE DIHYDRATE 2 PUFF: 100; 5 AEROSOL RESPIRATORY (INHALATION) at 18:02

## 2019-04-06 RX ADMIN — CARVEDILOL 6.25 MG: 6.25 TABLET, FILM COATED ORAL at 06:11

## 2019-04-06 RX ADMIN — ALLOPURINOL 100 MG: 100 TABLET ORAL at 08:53

## 2019-04-06 RX ADMIN — CITALOPRAM HYDROBROMIDE 20 MG: 20 TABLET ORAL at 08:53

## 2019-04-06 RX ADMIN — MOMETASONE FUROATE AND FORMOTEROL FUMARATE DIHYDRATE 2 PUFF: 100; 5 AEROSOL RESPIRATORY (INHALATION) at 06:01

## 2019-04-06 RX ADMIN — ENOXAPARIN SODIUM 40 MG: 40 INJECTION SUBCUTANEOUS at 08:52

## 2019-04-06 RX ADMIN — PANTOPRAZOLE SODIUM 40 MG: 40 TABLET, DELAYED RELEASE ORAL at 05:58

## 2019-04-06 ASSESSMENT — PAIN DESCRIPTION - PROGRESSION
CLINICAL_PROGRESSION: NOT CHANGED

## 2019-04-06 ASSESSMENT — PAIN - FUNCTIONAL ASSESSMENT: PAIN_FUNCTIONAL_ASSESSMENT: ACTIVITIES ARE NOT PREVENTED

## 2019-04-06 ASSESSMENT — PAIN SCALES - GENERAL
PAINLEVEL_OUTOF10: 0
PAINLEVEL_OUTOF10: 5
PAINLEVEL_OUTOF10: 0
PAINLEVEL_OUTOF10: 0

## 2019-04-06 NOTE — H&P
Hospital Medicine History & Physical      PCP: Areta Apley, MD    Date of Admission: 4/5/2019    Date of Service: 4/6/19      Chief Complaint:  Dyspnea, weakness,cough, nausea      History Of Present Illness:  76 y.o. female who presented to [facility] with above complains. Was diagnosed 1 week afo with influenza A, treated as outpatient but still had no improvement with her cough, general weakness, decreased appetite, PO intake, dyspnea on physical activity. Denied CP, dizziness, fever.  Called her PCP yesterday regarding her complains and advised to be evaluated in ER where she were diagnosed with bilateral pneumonia and after initial stabilization were admitted for further evaluation and treatment       Past Medical History:          Diagnosis Date    Allergic rhinitis 9/18/2013    Anxiety 9/18/2013    Asthma     Carpal tunnel syndrome 5/19/2015    Cellulitis of toe, right 6/5/2012    Cholelithiasis, common bile duct 1/10/2012    CKD (chronic kidney disease) stage 3, GFR 30-59 ml/min ([facility] [address] 9/18/2013    Depression     Dyslipidemia 11/26/2011    GERD (gastroesophageal reflux disease)     Gout 10/24/2011    HTN (hypertension) 10/24/2011    Hypertension     Irritable bowel syndrome 11/26/2011    Microscopic hematuria 12/13/2012    Midsternal chest pain     Migraine headache 2/22/2012    Multiple-type hyperlipidemia 5/19/2015    Muscle spasm 10/24/2011    Obesity, Class III, BMI 40-49.9 (morbid obesity) ([facility] [address] 11/26/2011    JARON (obstructive sleep apnea) 9/18/2013    Osteoarthritis     Other activity(E029.9)     sensative to vicryl sutures    Palpitations     Polyarthralgia 10/24/2011    Screening mammogram     Skin cancer 10/10/2016    Splenic mass 12/13/2012       Past Surgical History:          Procedure Laterality Date    BUNIONECTOMY      removal    CARDIOVASCULAR STRESS TEST      2011 normal    CHOLECYSTECTOMY      HYSTERECTOMY      SINUS SURGERY      TONSILLECTOMY Medications Prior to Admission:      Prior to Admission medications    Medication Sig Start Date End Date Taking? Authorizing Provider   potassium chloride (KLOR-CON M) 20 MEQ extended release tablet Take 1 tablet by mouth daily 4/5/19  Yes Caitlin Chavez MD   citalopram (CELEXA) 20 MG tablet TAKE 1 TABLET BY MOUTH DAILY 2/1/17  Yes LA Noriega   montelukast (SINGULAIR) 10 MG tablet Take 1 tablet by mouth nightly 12/7/16  Yes LA Noriega   cetirizine (ZYRTEC) 10 MG tablet Take 1 tablet by mouth daily 12/6/16  Yes LA Zhang   NEXIUM 40 MG delayed release capsule Take 1 capsule by mouth 2 times daily (before meals) 10/24/16  Yes LA Zhang   fluticasone-salmeterol (ADVAIR DISKUS) 500-50 MCG/DOSE diskus inhaler Inhale 1 puff into the lungs 2 times daily 8/22/16  Yes Jamie Polk MD   fluticasone [facility]) 50 MCG/ACT nasal spray use 2 (TWO) sprays nasally daily AS DIRECTED. 5/27/16  Yes LA Zhang   lovastatin (MEVACOR) 40 MG tablet Take 1 tablet by mouth nightly 4/11/16  Yes LA Dallas   colchicine (COLCRYS) 0.6 MG tablet Take 0.6 mg by mouth every other day    Yes Historical Provider, MD   febuxostat 40 MG TABS tablet Take 40 mg by mouth daily   Yes Historical Provider, MD   LORazepam (ATIVAN) 1 MG tablet Take 1 tablet by mouth every 6 hours as needed. 2/26/15  Yes Refbri Georges,    amLODIPine (NORVASC) 5 MG tablet  4/3/14  Yes Historical Provider, MD   carvedilol (COREG) 6.25 MG tablet Take 6.25 mg by mouth At night 4/3/14  Yes Historical Provider, MD   Ascorbic Acid (VITAMIN C) 500 MG CAPS Take 1 tablet by mouth 2 times daily    Yes Historical Provider, MD   Multiple Vitamins-Minerals (SENIOR MULTIVITAMIN PLUS) TABS Take 1 tablet by mouth daily. Yes Historical Provider, MD   aspirin 81 MG tablet Take 81 mg by mouth daily.    Yes Historical Provider, MD   valsartan-hydrochlorothiazide (DIOVAN-HCT) 320-25 MG per tablet Take 1 tablet by mouth daily. 9/18/13  Yes Neto De La Cruz MD   ondansetron (ZOFRAN-ODT) 4 MG disintegrating tablet Take 1 tablet by mouth 3 times daily as needed for Nausea or Vomiting 4/5/19   Riya Askew MD   benzonatate (TESSALON) 100 MG capsule Take 100 mg by mouth 3 times daily as needed for Cough    Historical Provider, MD Digna López HFA 45 MCG/ACT inhaler Inhale 1-2 puffs into the lungs every 4 hours as needed for Wheezing 12/7/16   LA Childers   traMADol (ULTRAM) 50 MG tablet Take 1 tablet by mouth every 8 hours as needed for Pain (MAY TAKE 2 TABS EVERY 6 HOURS FOR SEVERE PAIN) 11/23/16   Brittany West MD   SUMAtriptan (IMITREX) 50 MG tablet Take 50 mg by mouth once as needed for Migraine    Historical Provider, MD       Allergies:  Augmentin [amoxicillin-pot clavulanate]; Aspartame; Atorvastatin calcium; Carbapenems; Clonidine derivatives; Diclofenac sodium; Doxycycline hyclate; Enalapril; Fexofenadine hydrochloride; Flagyl [metronidazole]; Imipenem; Lisinopril; Macrolides and ketolides; Metronidazole hcl; Nsaids; Oxaprozin; Oxycodone-acetaminophen; Paroxetine; Sulfa antibiotics; and Tape [adhesive tape]    Social History:      The patient currently lives at home alone     TOBACCO:   reports that she quit smoking about 44 years ago. She has never used smokeless tobacco.  ETOH:   reports that she does not drink alcohol. Family History:       Reviewed in detail and negative for DM, CAD, Cancer, CVA. Positive as follows:        Problem Relation Age of Onset    Heart Disease Mother     Cancer Father         lung       REVIEW OF SYSTEMS:   Pertinent positives as noted in the HPI. All other systems reviewed and negative. PHYSICAL EXAM:    BP (!) 180/69   Pulse 89   Temp 98.1 °F (36.7 °C)   Resp 20   Ht 5' 6\" (1.676 m)   Wt 273 lb 1.6 oz (123.9 kg)   SpO2 96%   BMI 44.08 kg/m²     General appearance:  No apparent distress, appears stated age and cooperative.   HEENT:  Normal cephalic, atraumatic without obvious deformity. Pupils equal, round, and reactive to light. Extra ocular muscles intact. Conjunctivae/corneas clear. Neck: Supple, with full range of motion. No jugular venous distention. Trachea midline. Respiratory:  Normal respiratory effort. Clear to auscultation, bilaterally without Rales/Wheezes/Rhonchi. Cardiovascular:  Regular rate and rhythm with normal S1/S2 without murmurs, rubs or gallops. Abdomen:BIG  Soft, non-tender, non-distended with normal bowel sounds. Musculoskeletal:  No clubbing, cyanosis or edema bilaterally. Full range of motion without deformity. Skin: Skin color, texture, turgor normal.  No rashes or lesions. Neurologic:  Neurovascularly intact without any focal sensory/motor deficits. Cranial nerves: II-XII intact, grossly non-focal.  Psychiatric:  Alert and oriented, thought content appropriate, normal insight  Capillary Refill: Brisk,< 3 seconds   Peripheral Pulses: +2 palpable, equal bilaterally       Labs:     Recent Labs     04/05/19  1306 04/06/19  0449   WBC 5.9 6.0   HGB 14.0 12.8   HCT 40.0 37.6    214     Recent Labs     04/05/19  1306 04/06/19  0449   * 136   K 2.8* 3.7   CL 90* 98   CO2 25 23   BUN 21 16   CREATININE 1.15* 0.96*   CALCIUM 9.3 9.3     Recent Labs     04/05/19  1306   AST 37*   ALT 38*   BILITOT 0.9*   ALKPHOS 64     No results for input(s): INR in the last 72 hours. No results for input(s): Dru Dusky in the last 72 hours.     Urinalysis:      Lab Results   Component Value Date    NITRU Negative 04/05/2019    WBCUA 0-2 04/05/2019    BACTERIA Rare 04/05/2019    RBCUA 0-2 04/05/2019    BLOODU Negative 04/05/2019    SPECGRAV 1.010 04/05/2019    GLUCOSEU Negative 04/05/2019    GLUCOSEU NEG 12/17/2011           XR CHEST PORTABLE   Final Result   Right upper lobe infiltrate and bibasilar infiltrates          ASSESSMENT:    Active Hospital Problems    Diagnosis Date Noted    Pneumonia [J18.9] 04/05/2019       PLAN:        DVT Prophylaxis: lovenox  Diet: DIET GENERAL;  Code Status: Full Code    PT/OT Eval Status: done    Dispo - CAP after influenza A- atbs initiated, follow up clinically  Influenza A- treated  Repeated test negative  HTN- not well controlled, increase norvasc to 10 MG, follow up clinically  hypokalemia present on admission- replaced   Weakness, dyspnea, nausea- related to CAP- hydrated over night, follow up clinically  Morbid obesity due to excessive calory intake with BMI $$%- supportive care, PT on case  Medically stable for acute admission at [address] MD    Thank you Vickie Henry MD for the opportunity to be involved in this patient's care. If you have any questions or concerns please feel free to contact me.

## 2019-04-06 NOTE — PROGRESS NOTES
mass.   has a past surgical history that includes sinus surgery; Hysterectomy; Bunionectomy; Tonsillectomy; cardiovascular stress test; and Cholecystectomy. Restrictions  Restrictions/Precautions  Restrictions/Precautions: Isolation, Contact Precautions  Required Braces or Orthoses?: No  Vision/Hearing  Vision: Impaired  Vision Exceptions: Wears glasses at all times  Hearing: Within functional limits     Subjective  General  Chart Reviewed: Yes  Patient assessed for rehabilitation services?: Yes  Response To Previous Treatment: Not applicable  Family / Caregiver Present: No  Referring Practitioner: Reji Felix  Referral Date : 04/06/19  Diagnosis: electrolyte imbalance  Follows Commands: Within Functional Limits  Subjective  Subjective: Pt is a 76 y.o. female with h/p pneumonia per her reports. Denies any pain this morning and states her mobility has been slow.   Pain Screening  Patient Currently in Pain: No  Pain Assessment  Pain Assessment: 0-10  Vital Signs  Patient Currently in Pain: No  Pre Treatment Pain Screening  Intervention List: Patient able to continue with treatment    Orientation  Orientation  Overall Orientation Status: Within Normal Limits  Social/Functional History  Social/Functional History  Lives With: Alone  Type of Home: Apartment  Home Layout: One level  Home Access: Stairs to enter with rails  Entrance Stairs - Number of Steps: \"I have no idea\"  Entrance Stairs - Rails: Both  Bathroom Shower/Tub: Walk-in shower  Bathroom Toilet: Handicap height  Home Equipment: Cane  ADL Assistance: Independent  Homemaking Assistance: Independent  Ambulation Assistance: Independent  Transfer Assistance: Independent  Active : Yes  Mode of Transportation: Car  Occupation: Retired  Cognition        Objective     Observation/Palpation  Posture: Fair    PROM RLE (degrees)  RLE General PROM: NT  AROM RLE (degrees)  RLE AROM: WFL  PROM LLE (degrees)  LLE General PROM: NT  AROM LLE (degrees)  LLE AROM : WFL  PROM Stair training, Balance Training, Functional Mobility Training, Endurance Training, Home Exercise Program, Transfer Training, Safety Education & Training  Safety Devices  Type of devices: All fall risk precautions in place, Bed alarm in place, Call light within reach, Patient at risk for falls, Left in bed  Restraints  Initially in place: No    G-Code  PT G-Codes  Functional Assessment Tool Used: Professional Judgement    OutComes Score                                                  AM-PAC Score             Goals  Short term goals  Time Frame for Short term goals: 3-5 days to 1 week  Short term goal 1: Pt to be (I) with bed mobility. Short term goal 2: Pt to be (I) with transfers with improved safety. Short term goal 3: Pt to be (I) with gait with LRD to no device with min to no deviations > or equal to 100' with improved safety. Short term goal 4: Pt to demo 4+/5 bilateral LE strength to facilitate improved functional mobility. Short term goal 5: Pt to demo GOOD overall standing and ambulatory balance to decrase risk for falls. Short term goal 6: Pt to be (I) with ther-ex for HEP. Long term goals  Time Frame for Long term goals : Same as STGs; to determine D/C  Patient Goals   Patient goals :  To go home       Therapy Time   Individual Concurrent Group Co-treatment   Time In [facility] 30         Time Out [address]         Minutes Annmarie Appiah 27

## 2019-04-07 LAB
ANION GAP SERPL CALCULATED.3IONS-SCNC: 14 MEQ/L (ref 9–15)
BASOPHILS ABSOLUTE: 0 K/UL (ref 0–0.2)
BASOPHILS RELATIVE PERCENT: 0.3 %
BUN BLDV-MCNC: 18 MG/DL (ref 8–23)
CALCIUM SERPL-MCNC: 9.6 MG/DL (ref 8.5–9.9)
CHLORIDE BLD-SCNC: 98 MEQ/L (ref 95–107)
CO2: 24 MEQ/L (ref 20–31)
CREAT SERPL-MCNC: 1.08 MG/DL (ref 0.5–0.9)
EOSINOPHILS ABSOLUTE: 0 K/UL (ref 0–0.7)
EOSINOPHILS RELATIVE PERCENT: 0.2 %
GFR AFRICAN AMERICAN: 60
GFR NON-AFRICAN AMERICAN: 49.6
GLUCOSE BLD-MCNC: 95 MG/DL (ref 70–99)
HCT VFR BLD CALC: 36.9 % (ref 37–47)
HEMOGLOBIN: 12.7 G/DL (ref 12–16)
LYMPHOCYTES ABSOLUTE: 1.5 K/UL (ref 1–4.8)
LYMPHOCYTES RELATIVE PERCENT: 14.1 %
MCH RBC QN AUTO: 32.3 PG (ref 27–31.3)
MCHC RBC AUTO-ENTMCNC: 34.4 % (ref 33–37)
MCV RBC AUTO: 93.7 FL (ref 82–100)
MONOCYTES ABSOLUTE: 1.1 K/UL (ref 0.2–0.8)
MONOCYTES RELATIVE PERCENT: 10 %
NEUTROPHILS ABSOLUTE: 8 K/UL (ref 1.4–6.5)
NEUTROPHILS RELATIVE PERCENT: 75.4 %
PDW BLD-RTO: 12.6 % (ref 11.5–14.5)
PLATELET # BLD: 251 K/UL (ref 130–400)
POTASSIUM SERPL-SCNC: 3.8 MEQ/L (ref 3.4–4.9)
RBC # BLD: 3.94 M/UL (ref 4.2–5.4)
SODIUM BLD-SCNC: 136 MEQ/L (ref 135–144)
URINE CULTURE, ROUTINE: NORMAL
WBC # BLD: 10.7 K/UL (ref 4.8–10.8)

## 2019-04-07 PROCEDURE — 97530 THERAPEUTIC ACTIVITIES: CPT | Performed by: PHYSICAL THERAPIST

## 2019-04-07 PROCEDURE — 6370000000 HC RX 637 (ALT 250 FOR IP): Performed by: INTERNAL MEDICINE

## 2019-04-07 PROCEDURE — 36415 COLL VENOUS BLD VENIPUNCTURE: CPT

## 2019-04-07 PROCEDURE — 6360000002 HC RX W HCPCS: Performed by: INTERNAL MEDICINE

## 2019-04-07 PROCEDURE — 94640 AIRWAY INHALATION TREATMENT: CPT

## 2019-04-07 PROCEDURE — 2580000003 HC RX 258: Performed by: INTERNAL MEDICINE

## 2019-04-07 PROCEDURE — 80048 BASIC METABOLIC PNL TOTAL CA: CPT

## 2019-04-07 PROCEDURE — 97110 THERAPEUTIC EXERCISES: CPT | Performed by: PHYSICAL THERAPIST

## 2019-04-07 PROCEDURE — 85025 COMPLETE CBC W/AUTO DIFF WBC: CPT

## 2019-04-07 PROCEDURE — 1210000000 HC MED SURG R&B

## 2019-04-07 PROCEDURE — 2700000000 HC OXYGEN THERAPY PER DAY

## 2019-04-07 RX ORDER — GUAIFENESIN/DEXTROMETHORPHAN 100-10MG/5
5 SYRUP ORAL EVERY 4 HOURS PRN
Status: DISCONTINUED | OUTPATIENT
Start: 2019-04-07 | End: 2019-04-08 | Stop reason: HOSPADM

## 2019-04-07 RX ORDER — ONDANSETRON 4 MG/1
4 TABLET, ORALLY DISINTEGRATING ORAL EVERY 8 HOURS PRN
Status: DISCONTINUED | OUTPATIENT
Start: 2019-04-07 | End: 2019-04-08 | Stop reason: HOSPADM

## 2019-04-07 RX ORDER — CARVEDILOL 25 MG/1
25 TABLET ORAL 2 TIMES DAILY WITH MEALS
Status: DISCONTINUED | OUTPATIENT
Start: 2019-04-07 | End: 2019-04-08 | Stop reason: HOSPADM

## 2019-04-07 RX ORDER — SENNA PLUS 8.6 MG/1
1 TABLET ORAL NIGHTLY
Status: DISCONTINUED | OUTPATIENT
Start: 2019-04-07 | End: 2019-04-08 | Stop reason: HOSPADM

## 2019-04-07 RX ORDER — POLYETHYLENE GLYCOL 3350 17 G/17G
17 POWDER, FOR SOLUTION ORAL DAILY
Status: DISCONTINUED | OUTPATIENT
Start: 2019-04-07 | End: 2019-04-08 | Stop reason: HOSPADM

## 2019-04-07 RX ADMIN — CARVEDILOL 25 MG: 25 TABLET, FILM COATED ORAL at 09:38

## 2019-04-07 RX ADMIN — SIMVASTATIN 20 MG: 20 TABLET, FILM COATED ORAL at 20:34

## 2019-04-07 RX ADMIN — MOMETASONE FUROATE AND FORMOTEROL FUMARATE DIHYDRATE 2 PUFF: 100; 5 AEROSOL RESPIRATORY (INHALATION) at 06:16

## 2019-04-07 RX ADMIN — LEVOFLOXACIN 500 MG: 5 INJECTION, SOLUTION INTRAVENOUS at 15:23

## 2019-04-07 RX ADMIN — COLCHICINE 0.6 MG: 0.6 TABLET, FILM COATED ORAL at 09:39

## 2019-04-07 RX ADMIN — VALSARTAN 320 MG: 80 TABLET, FILM COATED ORAL at 09:39

## 2019-04-07 RX ADMIN — GUAIFENESIN AND DEXTROMETHORPHAN 5 ML: 100; 10 SYRUP ORAL at 09:38

## 2019-04-07 RX ADMIN — MOMETASONE FUROATE AND FORMOTEROL FUMARATE DIHYDRATE 2 PUFF: 100; 5 AEROSOL RESPIRATORY (INHALATION) at 18:15

## 2019-04-07 RX ADMIN — ENOXAPARIN SODIUM 40 MG: 40 INJECTION SUBCUTANEOUS at 09:39

## 2019-04-07 RX ADMIN — CITALOPRAM HYDROBROMIDE 20 MG: 20 TABLET ORAL at 09:40

## 2019-04-07 RX ADMIN — CARVEDILOL 25 MG: 25 TABLET, FILM COATED ORAL at 16:55

## 2019-04-07 RX ADMIN — Medication 10 ML: at 09:39

## 2019-04-07 RX ADMIN — AMLODIPINE BESYLATE 10 MG: 10 TABLET ORAL at 09:40

## 2019-04-07 RX ADMIN — ASPIRIN 81 MG 81 MG: 81 TABLET ORAL at 09:40

## 2019-04-07 RX ADMIN — PANTOPRAZOLE SODIUM 40 MG: 40 TABLET, DELAYED RELEASE ORAL at 05:53

## 2019-04-07 RX ADMIN — MONTELUKAST SODIUM 10 MG: 10 TABLET, FILM COATED ORAL at 20:34

## 2019-04-07 RX ADMIN — Medication 10 ML: at 20:37

## 2019-04-07 RX ADMIN — ONDANSETRON 4 MG: 4 TABLET, ORALLY DISINTEGRATING ORAL at 09:38

## 2019-04-07 RX ADMIN — ALLOPURINOL 100 MG: 100 TABLET ORAL at 09:39

## 2019-04-07 RX ADMIN — HYDROCHLOROTHIAZIDE 25 MG: 25 TABLET ORAL at 09:39

## 2019-04-07 ASSESSMENT — PAIN - FUNCTIONAL ASSESSMENT
PAIN_FUNCTIONAL_ASSESSMENT: ACTIVITIES ARE NOT PREVENTED

## 2019-04-07 ASSESSMENT — PAIN DESCRIPTION - PROGRESSION
CLINICAL_PROGRESSION: NOT CHANGED

## 2019-04-07 ASSESSMENT — PAIN SCALES - GENERAL
PAINLEVEL_OUTOF10: 0

## 2019-04-07 NOTE — PROGRESS NOTES
Physical Therapy  Facility/Department: [facility] MED SURG UNIT  Daily Treatment Note  NAME: Nadeen Almodovar  : 1945  MRN: [id number]    Date of Service: 2019    Discharge Recommendations:  Continue to assess pending progress        Patient Diagnosis(es): The primary encounter diagnosis was Electrolyte imbalance. A diagnosis of Post-influenza syndrome was also pertinent to this visit. has a past medical history of Allergic rhinitis, Anxiety, Asthma, Carpal tunnel syndrome, Cellulitis of toe, right, Cholelithiasis, common bile duct, CKD (chronic kidney disease) stage 3, GFR 30-59 ml/min (HCC), Depression, Dyslipidemia, GERD (gastroesophageal reflux disease), Gout, HTN (hypertension), Hypertension, Irritable bowel syndrome, Microscopic hematuria, Midsternal chest pain, Migraine headache, Multiple-type hyperlipidemia, Muscle spasm, Obesity, Class III, BMI 40-49.9 (morbid obesity) ([facility] [address] JARON (obstructive sleep apnea), Osteoarthritis, Other activity(E029.9), Palpitations, Polyarthralgia, Screening mammogram, Skin cancer, and Splenic mass. has a past surgical history that includes sinus surgery; Hysterectomy; Bunionectomy; Tonsillectomy; cardiovascular stress test; and Cholecystectomy. Restrictions  Restrictions/Precautions  Restrictions/Precautions: Isolation, Contact Precautions  Required Braces or Orthoses?: No  Subjective   Subjective  Subjective: Sitting in bed ok for PT  Pain Assessment  Pain Assessment: 0-10  Pain Level: 0  Oxygen Therapy  O2 Flow Rate (L/min): 1 L/min  Pre Treatment Pain Screening  Intervention List: Patient able to continue with treatment    Orientation     Cognition      Objective         Ambulation  Ambulation?: Yes  Ambulation 1  Surface: level tile  Device: No Device  Other Apparatus: O2  Assistance: Contact guard assistance;Stand by assistance  Quality of Gait: Pt with CGA to SBA with ambulation without AD with slow pace and short step length.   Pt did reach for walls with min unsteadiness noted, but no LOB. Distance: 10'x2  Comments: Pt reported being light headed, with standing and did not want to walk past door  Stairs/Curb  Stairs?: No     Balance  Posture: Fair  Sitting - Static: Good  Sitting - Dynamic: Good  Standing - Static: Fair  Standing - Dynamic: Fair;-  Exercises  Quad Sets: x20ea  Gluteal Sets: x20ea  Hip Flexion: seated marching x20 ea  Knee Long Arc Quad: x20 ea  Ankle Pumps: x20ea  Other exercises  Other exercises?: Yes  Other exercises 1: sit<>stand 2x5       Assessment   Body structures, Functions, Activity limitations: Decreased functional mobility ; Decreased endurance;Decreased strength;Decreased balance;Decreased safe awareness  Treatment Diagnosis: Difficulty with gait  Prognosis: Good  REQUIRES PT FOLLOW UP: Yes  Activity Tolerance  Activity Tolerance: Patient limited by fatigue;Patient limited by endurance; Other(light headedness)   Pt challenged and fatigued with program today. Attempted to inc ambulation distance however, pt refused to ambulate beyond the door. Reported feeling light headed throughout session which has been her normal of late but not baseline. Pt fatigued quickly with program.  Continued skilled care addressing strength, posture balance, gait stairs, functional mobility and sustained activity tolerance progressing towards goals and return to max/PLOF. G-Code     OutComes Score            AM-PAC Score     Goals  Short term goals  Time Frame for Short term goals: 3-5 days to 1 week  Short term goal 1: Pt to be (I) with bed mobility. Short term goal 2: Pt to be (I) with transfers with improved safety. Short term goal 3: Pt to be (I) with gait with LRD to no device with min to no deviations > or equal to 100' with improved safety. Short term goal 4: Pt to demo 4+/5 bilateral LE strength to facilitate improved functional mobility. Short term goal 5: Pt to demo GOOD overall standing and ambulatory balance to decrase risk for falls.   Short

## 2019-04-07 NOTE — PROGRESS NOTES
Hospitalist Progress Note      PCP: Nan Archer MD    Date of Admission: 4/5/2019    Chief Complaint: cough, constipation    Subjective: pt eating breakfast, looks comfortable     Medications:  Reviewed    Infusion Medications   Scheduled Medications    carvedilol  25 mg Oral BID WC    polyethylene glycol  17 g Oral Daily    senna  1 tablet Oral Nightly    magnesium hydroxide  30 mL Oral Once    amLODIPine  10 mg Oral Daily    aspirin  81 mg Oral Daily    citalopram  20 mg Oral Daily    simvastatin  20 mg Oral Nightly    montelukast  10 mg Oral Nightly    pantoprazole  40 mg Oral QAM AC    levofloxacin  500 mg Intravenous Q24H    sodium chloride flush  10 mL Intravenous 2 times per day    enoxaparin  40 mg Subcutaneous Daily    mometasone-formoterol  2 puff Inhalation BID    valsartan  320 mg Oral Daily    And    hydrochlorothiazide  25 mg Oral Daily    colchicine  0.6 mg Oral Daily    allopurinol  100 mg Oral Daily     PRN Meds: guaiFENesin-dextromethorphan, ondansetron, ipratropium-albuterol, benzonatate, LORazepam, traMADol, sodium chloride flush, magnesium hydroxide, ondansetron, acetaminophen      Intake/Output Summary (Last 24 hours) at 4/7/2019 0828  Last data filed at 4/6/2019 1333  Gross per 24 hour   Intake 480 ml   Output --   Net 480 ml       Exam:    BP (!) 166/58   Pulse 74   Temp 97.9 °F (36.6 °C) (Oral)   Resp 18   Ht 5' 6\" (1.676 m)   Wt 273 lb 1.6 oz (123.9 kg)   SpO2 96%   BMI 44.08 kg/m²     General appearance: No apparent distress, appears stated age and cooperative. HEENT: Pupils equal, round, and reactive to light. Conjunctivae/corneas clear. Neck: Supple, with full range of motion. No jugular venous distention. Trachea midline. Respiratory:  Normal respiratory effort. Clear to auscultation, bilaterally without Rales/Wheezes/Rhonchi. Cardiovascular: Regular rate and rhythm with normal S1/S2 without murmurs, rubs or gallops.   Abdomen: Soft, non-tender, non-distended with normal bowel sounds. Musculoskeletal: No clubbing, cyanosis or edema bilaterally. Full range of motion without deformity. Skin: Skin color, texture, turgor normal.  No rashes or lesions. Neurologic:  Neurovascularly intact without any focal sensory/motor deficits. Cranial nerves: II-XII intact, grossly non-focal.  Psychiatric: Alert and oriented, thought content appropriate, normal insight  Capillary Refill: Brisk,< 3 seconds   Peripheral Pulses: +2 palpable, equal bilaterally       Labs:   Recent Labs     04/05/19  1306 04/06/19  0449 04/07/19  0627   WBC 5.9 6.0 10.7   HGB 14.0 12.8 12.7   HCT 40.0 37.6 36.9*    214 251     Recent Labs     04/05/19  1306 04/06/19  0449 04/07/19  0627   * 136 136   K 2.8* 3.7 3.8   CL 90* 98 98   CO2 25 23 24   BUN 21 16 18   CREATININE 1.15* 0.96* 1.08*   CALCIUM 9.3 9.3 9.6     Recent Labs     04/05/19  1306   AST 37*   ALT 38*   BILITOT 0.9*   ALKPHOS 64     No results for input(s): INR in the last 72 hours. No results for input(s): Delcie Champion in the last 72 hours.     Urinalysis:      Lab Results   Component Value Date    NITRU Negative 04/05/2019    WBCUA 0-2 04/05/2019    BACTERIA Rare 04/05/2019    RBCUA 0-2 04/05/2019    BLOODU Negative 04/05/2019    SPECGRAV 1.010 04/05/2019    GLUCOSEU Negative 04/05/2019    GLUCOSEU NEG 12/17/2011       Radiology:  XR CHEST PORTABLE   Final Result   Right upper lobe infiltrate and bibasilar infiltrates              Assessment/Plan:    Active Hospital Problems    Diagnosis Date Noted    Pneumonia [J18.9] 04/05/2019         DVT Prophylaxis: lovenox  Diet: DIET GENERAL;  Code Status: Full Code    PT/OT Eval Status: done    Dispo - CAP after influenza A- atbs initiated, follow up clinically  Influenza A- treated  Repeated test negative  HTN- not well controlled, increased norvasc to 10 MG,  Increase coreg genny 25 mg today, follow up clinically  hypokalemia present on admission- replaced   Weakness, dyspnea, nausea- related to CAP- hydrated over night, follow up clinically  Constipation- see orders   Morbid obesity due to excessive calory intake with BMI 44%- supportive care, PT on case  Medically stable for acute admission at [facility]               Electronically signed by Thea Torres MD on 4/7/2019 at 8:28 AM

## 2019-04-08 VITALS
BODY MASS INDEX: 43.89 KG/M2 | SYSTOLIC BLOOD PRESSURE: 162 MMHG | WEIGHT: 273.1 LBS | OXYGEN SATURATION: 94 % | HEIGHT: 66 IN | RESPIRATION RATE: 18 BRPM | HEART RATE: 68 BPM | TEMPERATURE: 98.2 F | DIASTOLIC BLOOD PRESSURE: 67 MMHG

## 2019-04-08 PROCEDURE — 97116 GAIT TRAINING THERAPY: CPT

## 2019-04-08 PROCEDURE — 97535 SELF CARE MNGMENT TRAINING: CPT

## 2019-04-08 PROCEDURE — 97530 THERAPEUTIC ACTIVITIES: CPT

## 2019-04-08 PROCEDURE — 6370000000 HC RX 637 (ALT 250 FOR IP): Performed by: INTERNAL MEDICINE

## 2019-04-08 PROCEDURE — 97166 OT EVAL MOD COMPLEX 45 MIN: CPT

## 2019-04-08 PROCEDURE — 6360000002 HC RX W HCPCS: Performed by: INTERNAL MEDICINE

## 2019-04-08 PROCEDURE — 97110 THERAPEUTIC EXERCISES: CPT

## 2019-04-08 PROCEDURE — 94640 AIRWAY INHALATION TREATMENT: CPT

## 2019-04-08 PROCEDURE — 2580000003 HC RX 258: Performed by: INTERNAL MEDICINE

## 2019-04-08 RX ORDER — AMLODIPINE BESYLATE 10 MG/1
10 TABLET ORAL DAILY
Qty: 30 TABLET | Refills: 0 | Status: SHIPPED | OUTPATIENT
Start: 2019-04-08

## 2019-04-08 RX ORDER — CARVEDILOL 25 MG/1
6.25 TABLET ORAL 2 TIMES DAILY WITH MEALS
Qty: 60 TABLET | Refills: 0 | Status: SHIPPED | OUTPATIENT
Start: 2019-04-08

## 2019-04-08 RX ORDER — LEVOFLOXACIN 500 MG/1
500 TABLET, FILM COATED ORAL DAILY
Qty: 7 TABLET | Refills: 0 | Status: SHIPPED | OUTPATIENT
Start: 2019-04-08 | End: 2019-04-15

## 2019-04-08 RX ADMIN — CARVEDILOL 25 MG: 25 TABLET, FILM COATED ORAL at 09:22

## 2019-04-08 RX ADMIN — Medication 10 ML: at 09:26

## 2019-04-08 RX ADMIN — ALLOPURINOL 100 MG: 100 TABLET ORAL at 09:22

## 2019-04-08 RX ADMIN — PANTOPRAZOLE SODIUM 40 MG: 40 TABLET, DELAYED RELEASE ORAL at 05:23

## 2019-04-08 RX ADMIN — HYDROCHLOROTHIAZIDE 25 MG: 25 TABLET ORAL at 09:22

## 2019-04-08 RX ADMIN — ASPIRIN 81 MG 81 MG: 81 TABLET ORAL at 09:22

## 2019-04-08 RX ADMIN — MOMETASONE FUROATE AND FORMOTEROL FUMARATE DIHYDRATE 2 PUFF: 100; 5 AEROSOL RESPIRATORY (INHALATION) at 06:08

## 2019-04-08 RX ADMIN — AMLODIPINE BESYLATE 10 MG: 10 TABLET ORAL at 09:22

## 2019-04-08 RX ADMIN — VALSARTAN 320 MG: 80 TABLET, FILM COATED ORAL at 09:25

## 2019-04-08 RX ADMIN — COLCHICINE 0.6 MG: 0.6 TABLET, FILM COATED ORAL at 09:22

## 2019-04-08 RX ADMIN — ENOXAPARIN SODIUM 40 MG: 40 INJECTION SUBCUTANEOUS at 09:22

## 2019-04-08 RX ADMIN — CITALOPRAM HYDROBROMIDE 20 MG: 20 TABLET ORAL at 09:22

## 2019-04-08 ASSESSMENT — PAIN SCALES - GENERAL
PAINLEVEL_OUTOF10: 0
PAINLEVEL_OUTOF10: 8
PAINLEVEL_OUTOF10: 0
PAINLEVEL_OUTOF10: 0

## 2019-04-08 ASSESSMENT — PAIN DESCRIPTION - PAIN TYPE: TYPE: ACUTE PAIN

## 2019-04-08 ASSESSMENT — PAIN DESCRIPTION - LOCATION: LOCATION: HEAD

## 2019-04-08 ASSESSMENT — PAIN DESCRIPTION - PROGRESSION
CLINICAL_PROGRESSION: NOT CHANGED

## 2019-04-08 ASSESSMENT — PAIN - FUNCTIONAL ASSESSMENT
PAIN_FUNCTIONAL_ASSESSMENT: ACTIVITIES ARE NOT PREVENTED
PAIN_FUNCTIONAL_ASSESSMENT: ACTIVITIES ARE NOT PREVENTED
PAIN_FUNCTIONAL_ASSESSMENT: PREVENTS OR INTERFERES SOME ACTIVE ACTIVITIES AND ADLS

## 2019-04-08 NOTE — DISCHARGE INSTR - COC
Continuity of Care Form    Patient Name: Devin Bender   :  1945  MRN:  [id number]    Admit date:  2019  Discharge date: 2019 ***    Code Status Order: Full Code   Advance Directives:   Advance Care Flowsheet Documentation     Date/Time Healthcare Directive Type of Healthcare Directive Copy in [address] Agent's Name Healthcare Agent's Phone Number    19 1626  No, patient does not have an advance directive for healthcare treatment -- -- -- -- --          Admitting Physician:  Luba Valenzuela MD  PCP: Conner Dunlap MD    Discharging Nurse: [facility] Unit/Room#: 0490/6501-38  Discharging Unit Phone Number: ***    Emergency Contact:   Extended Emergency Contact Information  Primary Emergency Contact: April Hurtado   [place] [address] [address]  Phone: [phone]  Relation: Other    Past Surgical History:  Past Surgical History:   Procedure Laterality Date    BUNIONECTOMY      removal    CARDIOVASCULAR STRESS TEST       normal    CHOLECYSTECTOMY      HYSTERECTOMY      SINUS SURGERY      TONSILLECTOMY         Immunization History:   Immunization History   Administered Date(s) Administered    Influenza Vaccine, unspecified formulation 2001, 10/30/2002, 2003, 10/13/2004, 2006, 2015, 09/15/2016    Pneumococcal 13-valent Conjugate ([id number]) 2015    Pneumococcal Polysaccharide ([id number]) 10/10/2005, 10/02/2013    Zoster Live (Zostavax) 2014       Active Problems:  Patient Active Problem List   Diagnosis Code    Polyarthralgia M25.50    HTN (hypertension) I10    Gout M10.9    Depression F32.9    GERD (gastroesophageal reflux disease) K21.9    Dyslipidemia E78.5    Irritable bowel syndrome K58.9    Obesity, Class III, BMI 40-49.9 (morbid obesity) ([facility] [address] E66.01    Migraine headache G43.909    Microscopic hematuria R31.29    Splenic mass R16.1    Allergic rhinitis J30.9    CKD (chronic kidney disease) stage 3, GFR 30-59 ml/min ([facility]) N18.3    Asthma J45.909    JARON (obstructive sleep apnea) G47.33    Anxiety F41.9    OA (osteoarthritis) of knee M17.10    Midsternal chest pain R07.89    Palpitation R00.2    Bunion M21.619    Carpal tunnel syndrome G56.00    Chronic maxillary sinusitis J32.0    Melancholia F32.9    Ganglion of tendon sheath M67.40    Glaucoma suspect H40.009    Acquired hallux rigidus M20.20    Hypermetropia H52.00    Lateral epicondylitis M77.10    Multiple-type hyperlipidemia E78.2    Nuclear senile cataract H25.10    Primary localized osteoarthrosis of ankle and foot M19.079    Sensory hearing loss, bilateral H90.3    Swelling, mass, or lump in head and neck R22.0, R22.1    Menopausal symptom N95.1    Tear film insufficiency H04.129    Skin cancer C44.90    Pneumonia J18.9       Isolation/Infection:   Isolation          No Isolation            Nurse Assessment:  Last Vital Signs: BP (!) 162/67   Pulse 68   Temp 98.2 °F (36.8 °C) (Oral)   Resp 18   Ht 5' 6\" (1.676 m)   Wt 273 lb 1.6 oz (123.9 kg)   SpO2 94%   BMI 44.08 kg/m²     Last documented pain score (0-10 scale): Pain Level: 0  Last Weight:   Wt Readings from Last 1 Encounters:   04/05/19 273 lb 1.6 oz (123.9 kg)     Mental Status:  oriented, alert, coherent, logical, thought processes intact and able to concentrate and follow conversation    IV Access:  - None    Nursing Mobility/ADLs:  Walking   Independent  Transfer  Independent  Bathing  Independent  Dressing  Independent  Toileting  Independent  Feeding  Independent  Med Admin  Independent  Med Delivery   whole    Wound Care Documentation and Therapy:        Elimination:  Continence:   · Bowel:  Yes  · Bladder: Yes  Urinary Catheter: None   Colostomy/Ileostomy/Ileal Conduit: No       Date of Last BM: ***    Intake/Output Summary (Last 24 hours) at 4/8/2019 0807  Last data filed at 4/8/2019 0425  Gross per 24 hour   Intake 760 ml Output 300 ml   Net 460 ml     I/O last 3 completed shifts: In: 36 [P.O.:760]  Out: 300 [Urine:300]    Safety Concerns:     None    Impairments/Disabilities:      None    Nutrition Therapy:  Current Nutrition Therapy:   - Oral Diet:  General    Routes of Feeding: Oral  Liquids: No Restrictions  Daily Fluid Restriction: no  Last Modified Barium Swallow with Video (Video Swallowing Test): {Done Not Done [facility]:[id number]}    Treatments at the Time of Hospital Discharge:   Respiratory Treatments: ***  Oxygen Therapy:  is not on home oxygen therapy. Ventilator:    - No ventilator support    Rehab Therapies: Physical Therapy  Weight Bearing Status/Restrictions: No weight bearing restirctions  Other Medical Equipment (for information only, NOT a DME order):  walker  Other Treatments: ***    Patient's personal belongings (please select all that are sent with patient):  {[facility] DME Belongings:[id number]}    RN SIGNATURE:  Electronically signed by Brandy Barksdale. Leisa Silvestre on 4/8/19 at 12:39 PM    CASE MANAGEMENT/SOCIAL WORK SECTION    Inpatient Status Date: 4/5/2019    Readmission Risk Assessment Score:  Readmission Risk              Risk of Unplanned Readmission:        16           Discharging to Facility/ Agency   · Name: [facility]  · Address: [address] [place] [address]  · Phone: [phone]  · Fax: [phone]    / signature: Electronically signed by KIARRA Cheema on 4/8/2019 at 10:34 AM      PHYSICIAN SECTION    Prognosis: Good    Condition at Discharge: Stable    Rehab Potential (if transferring to Rehab): Good    Recommended Labs or Other Treatments After Discharge:     Physician Certification: I certify the above information and transfer of Gauri Treadwell  is necessary for the continuing treatment of the diagnosis listed and that she requires Home Care for greater 30 days.      Update Admission H&P: No change in H&P    PHYSICIAN SIGNATURE:  Electronically signed by Nenita Whiteside

## 2019-04-08 NOTE — PROGRESS NOTES
Physical Therapy  Facility/Department: [facility] MED SURG UNIT  Daily Treatment Note  NAME: Ron Phoenix  : 1945  MRN: [id number]    Date of Service: 2019    Discharge Recommendations:  Home with Home health PT   PT Equipment Recommendations  Equipment Needed: Yes  Other: rollator recommended    Patient Diagnosis(es): The primary encounter diagnosis was Electrolyte imbalance. A diagnosis of Post-influenza syndrome was also pertinent to this visit. has a past medical history of Allergic rhinitis, Anxiety, Asthma, Carpal tunnel syndrome, Cellulitis of toe, right, Cholelithiasis, common bile duct, CKD (chronic kidney disease) stage 3, GFR 30-59 ml/min (HCC), Depression, Dyslipidemia, GERD (gastroesophageal reflux disease), Gout, HTN (hypertension), Hypertension, Irritable bowel syndrome, Microscopic hematuria, Midsternal chest pain, Migraine headache, Multiple-type hyperlipidemia, Muscle spasm, Obesity, Class III, BMI 40-49.9 (morbid obesity) ([facility] [address] JARON (obstructive sleep apnea), Osteoarthritis, Other activity(E029.9), Palpitations, Polyarthralgia, Screening mammogram, Skin cancer, and Splenic mass. has a past surgical history that includes sinus surgery; Hysterectomy; Bunionectomy; Tonsillectomy; cardiovascular stress test; and Cholecystectomy. Restrictions  Restrictions/Precautions  Restrictions/Precautions: General Precautions  Required Braces or Orthoses?: No  Subjective   General  Chart Reviewed: Yes  Response To Previous Treatment: Patient with no complaints from previous session.   Family / Caregiver Present: No  Subjective  Subjective: Sitting in bed, agreeable to thearpy before she leaves  Pain Screening  Patient Currently in Pain: Yes  Pain Assessment  Pain Assessment: 0-10  Pain Level: 8  Pain Type: Acute pain  Pain Location: Head  Pain Radiating Towards: c/o sinus headache  Functional Pain Assessment: Prevents or interferes some active activities and ADLs  Vital Signs  Patient Currently in Pain: Yes       Orientation     Cognition      Objective   Bed mobility  Rolling to Left: Independent  Rolling to Right: Independent  Transfers  Sit to Stand: Independent  Stand to sit: Independent  Bed to Chair: Independent  Stand Pivot Transfers: Independent  Ambulation 1  Surface: level tile  Device: Rolling Walker  Other Apparatus: Wheelchair follow  Assistance: Stand by assistance  Quality of Gait: Amb with ww, and rollator and without aids. Does not need supoort of ww but did well with rollator, tendency to reach for walls/furniture for blance wihtout amb aids  Distance: 80' with ww, 28' with rollator, and 15' without aids  Comments: Feel pt would benefit from use of rollator- to keep pt mobile and increase her activity  Stairs/Curb  Stairs?: Yes  Stairs  # Steps : 10  Stairs Height: 6\"  Rails: Right ascending  Device: No Device  Assistance: Stand by assistance  Comment: Pt states she has a cane at ome to use in left hand if needed, she did not need today     Balance  Posture: Good  Sitting - Static: Good  Sitting - Dynamic: Good  Standing - Static: Good  Standing - Dynamic: Good  Exercises  Comments: Written HEP given to pt and reviewed                        Assessment   Body structures, Functions, Activity limitations: Decreased functional mobility   Assessment: Pt being d/c today, recommend home OT/PT evaluation for any further needs  Treatment Diagnosis: Decreased fucntional mobility  Prognosis: Good  Patient Education: Pt educated on HEP and use of rollator  REQUIRES PT FOLLOW UP: No  Activity Tolerance  Activity Tolerance: Patient Tolerated treatment well     G-Code     OutComes Score                                                  AM-PAC Score             Goals  Short term goals  Time Frame for Short term goals: 3-5 days to 1 week  Short term goal 1: Pt to be (I) with bed mobility. Short term goal 2: Pt to be (I) with transfers with improved safety.   Short term goal 3: Pt to be (I) with gait with LRD to no device with min to no deviations > or equal to 100' with improved safety. Short term goal 4: Pt to demo 4+/5 bilateral LE strength to facilitate improved functional mobility. Short term goal 5: Pt to demo GOOD overall standing and ambulatory balance to decrase risk for falls. Short term goal 6: Pt to be (I) with ther-ex for HEP. Long term goals  Time Frame for Long term goals : Same as STGs; to determine D/C  Patient Goals   Patient goals :  To go home    Plan    Plan  Times per week: 5-7x/wk  Times per day: Daily  Plan weeks: 1 week  Specific instructions for Next Treatment: Try w/w  Current Treatment Recommendations: Strengthening, Gait Training, Patient/Caregiver Education & Training, Stair training, Balance Training, Functional Mobility Training, Endurance Training, Home Exercise Program, Transfer Training, Safety Education & Training  Safety Devices  Type of devices: Gait belt, Call light within reach, Left in bed  Restraints  Initially in place: No     Therapy Time   Individual Concurrent Group Co-treatment   Time In  850         Time Out  950         Minutes  [place], [place]

## 2019-04-08 NOTE — PROGRESS NOTES
Occupational Therapy   Occupational Therapy Initial Assessment  Date: 2019   Patient Name: Nancy Silva  MRN: [id number]     : 1945    Date of Service: 2019    Discharge Recommendations:  Home [facility] health OT[facility]  OT Equipment Recommendations  Equipment Needed: Yes  Other: Shower chair, Rollator    Assessment   Performance deficits / Impairments: Decreased functional mobility ; Decreased ADL status; Decreased endurance;Decreased safe awareness;Decreased high-level IADLs;Decreased balance  Assessment: Pt is a 79y/o female who PLOF lives alone and was I with ADL. Pt presents to hospital s/p pneumonia and demo's the above resulting perf def/impair. Pt would benefit from OT skilled services to maximize strength/end and safety/I with ADL for safe d/c transition. Prognosis: Good  Decision Making: Medium Complexity  History: multi comorb  Exam: 6 perf def/impair  REQUIRES OT FOLLOW UP: Yes  Safety Devices  Safety Devices in place: Yes  Type of devices: All fall risk precautions in place           Patient Diagnosis(es): The primary encounter diagnosis was Electrolyte imbalance. A diagnosis of Post-influenza syndrome was also pertinent to this visit. has a past medical history of Allergic rhinitis, Anxiety, Asthma, Carpal tunnel syndrome, Cellulitis of toe, right, Cholelithiasis, common bile duct, CKD (chronic kidney disease) stage 3, GFR 30-59 ml/min ([facility]), Depression, Dyslipidemia, GERD (gastroesophageal reflux disease), Gout, HTN (hypertension), Hypertension, Irritable bowel syndrome, Microscopic hematuria, Midsternal chest pain, Migraine headache, Multiple-type hyperlipidemia, Muscle spasm, Obesity, Class III, BMI 40-49.9 (morbid obesity) ([address] JARON (obstructive sleep apnea), Osteoarthritis, Other activity(E029.9), Palpitations, Polyarthralgia, Screening mammogram, Skin cancer, and Splenic mass. has a past surgical history that includes sinus surgery;  Hysterectomy; Bunionectomy; Tonsillectomy; cardiovascular stress test; and Cholecystectomy. Restrictions  Restrictions/Precautions  Restrictions/Precautions: General Precautions  Required Braces or Orthoses?: No    Subjective   General  Patient assessed for rehabilitation services?: Yes  Pain Assessment  Pain Assessment: 0-10  Pain Level: 8  Pain Type: Acute pain  Pain Location: Head  Pain Radiating Towards: c/o sinus headache  Functional Pain Assessment: Prevents or interferes some active activities and ADLs  Social/Functional History  Social/Functional History  Lives With: Alone  Type of Home: Apartment  Home Layout: One level  Home Access: Stairs to enter with rails  Entrance Stairs - Number of Steps: \"I have no idea\"  Entrance Stairs - Rails: Both(can only reach one side at a time \"they're wide steps\")  Bathroom Shower/Tub: Walk-in shower  Bathroom Toilet: Handicap height  Home Equipment: Cane  ADL Assistance: Independent  Homemaking Assistance: Independent  Homemaking Responsibilities: Yes  Ambulation Assistance: Independent  Transfer Assistance: Independent  Active : Yes  Mode of Transportation: Car  Occupation: Retired  Leisure & Hobbies: Crafting       Objective   Vision: Impaired  Vision Exceptions: Wears glasses at all times  Hearing: Within functional limits    Orientation  Overall Orientation Status: Within Functional Limits  Observation/Palpation  Observation: Glasses, IV RUE  Functional Mobility  Functional - Mobility Device: 4-Wheeled Walker  Activity: Other; To/from bathroom  Assist Level: Stand by assistance  Functional Mobility Comments: trialed Rollator and RW to assess- pt did well with both. Toilet Transfers  Toilet - Technique: Ambulating  Toilet Transfer: Supervision  ADL  Feeding: Independent  Grooming: Modified independent   UE Bathing: Setup;Modified independent   LE Bathing: Supervision;Stand by assistance  UE Dressing: Modified independent   LE Dressing: Stand by assistance; Increased time to complete  Toileting: Supervision  Tone RUE  RUE Tone: Normotonic  Tone LUE  LUE Tone: Normotonic  Coordination  Movements Are Fluid And Coordinated: Yes            Practiced navigating up/down set of three 4\" steps and two 6\" steps using 1 handrail to simulate pt's steps she has at home. Pt required CGA/SBA. LUE AROM (degrees)  LUE AROM : WNL  Left Hand AROM (degrees)  Left Hand AROM: WNL  RUE AROM (degrees)  RUE AROM : WNL  Right Hand AROM (degrees)  Right Hand AROM: WNL  LUE Strength  Gross LUE Strength: WFL  RUE Strength  Gross RUE Strength: WFL                   Plan   Plan  Times per week: 3-6x/wk  Times per day: Daily  Current Treatment Recommendations: Strengthening, Balance Training, Functional Mobility Training, Endurance Training, Stair training, Safety Education & Training, Patient/Caregiver Education & Training, Self-Care / ADL, Home Management Training          Goals  Short term goals  Time Frame for Short term goals: 1-3 days- med pt  Short term goal 1: I with BUE HEP  Short term goal 2: I/MI toileting  Short term goal 3: I/MI LB dressing and bathing  Short term goal 4: I/MI fxl ADL xfers  Long term goals  Time Frame for Long term goals : Same as STGs  Long term goal 1: Same as STGs  Long term goal 2: Same as STGs  Patient Goals   Patient goals :  To go home       Therapy Time   Individual Concurrent Group Co-treatment   Time In  8:15         Time Out  9:15         Minutes  [address] [place], [place]

## 2019-04-08 NOTE — PROGRESS NOTES
Chart reviewed. Patient's care discussed in daily quality rounds. Patient was admitted to [facility] [place] with pneumonia. Patient was evaluated by PT/OT and therapies recommending [facility] upon DC. This  met with patient to discuss DC needs. Patient reports being agreeable with [facility]. [place] of choice provided and patient identifies [facility] as agency of choice. This  contacted Montey Goodell from [facility] with new referral.  Montey Goodell reports that [facility] will follow patient upon DC. No further SS or DC needs identified.   ASHER completed

## 2019-04-08 NOTE — DISCHARGE SUMMARY
Discharge Summary    Patient:  Rambo Chance  YOB: 1945    MRN: [id number]   Acct: [de-identified]    Primary Care Physician: Sharon Quinonez MD    Admit date:  4/5/2019    Discharge date:   04/08/19      Discharge Diagnoses:   <principal problem not specified>  Active Problems:    Pneumonia  Resolved Problems:    * No resolved hospital problems. *      Admitted for: [facility] Course: patient were treated for CAP, weakness. After completing IV atbs will be DC home to complete another 7 days PO levaquin     Consultants:      Discharge Medications:       Medication List      START taking these medications    levofloxacin 500 MG tablet  Commonly known as:  LEVAQUIN  Take 1 tablet by mouth daily for 7 days     potassium chloride 20 MEQ extended release tablet  Commonly known as:  KLOR-CON M  Take 1 tablet by mouth daily        CHANGE how you take these medications    amLODIPine 10 MG tablet  Commonly known as:  NORVASC  Take 1 tablet by mouth daily  What changed:    · medication strength  · how much to take  · how to take this  · when to take this     carvedilol 25 MG tablet  Commonly known as:  COREG  Take 0.5 tablets by mouth 2 times daily (with meals) At night  What changed:    · medication strength  · how much to take  · when to take this        CONTINUE taking these medications    aspirin 81 MG tablet     benzonatate 100 MG capsule  Commonly known as:  TESSALON     cetirizine 10 MG tablet  Commonly known as:  ZYRTEC  Take 1 tablet by mouth daily     citalopram 20 MG tablet  Commonly known as:  CELEXA  TAKE 1 TABLET BY MOUTH DAILY     colchicine 0.6 MG tablet  Commonly known as:  COLCRYS     febuxostat 40 MG Tabs tablet  Commonly known as:  ULORIC     fluticasone 50 MCG/ACT nasal spray  Commonly known as:  FLONASE  use 2 (TWO) sprays nasally daily AS DIRECTED.      fluticasone-salmeterol 500-50 MCG/DOSE diskus inhaler  Commonly known as:  ADVAIR DISKUS  Inhale 1 puff into the lungs 2 (36.7 °C)  98.2 °F (36.8 °C)   TempSrc:  Oral  Oral   SpO2: 96% 93% 94% 94%   Weight:       Height:         Weight: Weight: 273 lb 1.6 oz (123.9 kg)     24 hour intake/output:    Intake/Output Summary (Last 24 hours) at 4/8/2019 0809  Last data filed at 4/8/2019 0425  Gross per 24 hour   Intake 760 ml   Output 300 ml   Net 460 ml       General appearance - alert, well appearing, and in no distress  Chest - clear to auscultation, no wheezes, rales or rhonchi, symmetric air entry  Heart - normal rate, regular rhythm, normal S1, S2, no murmurs, rubs, clicks or gallops  Abdomen - soft, nontender, nondistended, no masses or organomegaly  Obese: Yes; Protuberant: Yes   Neurological - alert, oriented, normal speech, no focal findings or movement disorder noted  Extremities - peripheral pulses normal, no pedal edema, no clubbing or cyanosis  Skin - normal coloration and turgor, no rashes, no suspicious skin lesions noted      Radiology reports as per the Radiologist  Radiology: Xr Chest Portable    Result Date: 4/5/2019  COMPARISON: June 20, 2016; March 29, 2019 HISTORY: Cough and congestion TECHNIQUE: AP view FINDINGS: Opacities are seen in the right upper lobe and bilaterally in both bases. The cardiac silhouette is borderline and is thought to be accentuated by the portable technique. Atherosclerotic calcifications are seen in the thoracic aorta. Mild degenerative changes are seen in both shoulders and the thoracic spine.      Right upper lobe infiltrate and bibasilar infiltrates       Results for orders placed or performed during the hospital encounter of 04/05/19   Rapid Influenza A/B Antigens   Result Value Ref Range    Rapid Influenza A Ag Negative Negative    Rapid Influenza B Ag Negative Negative   Urine Culture   Result Value Ref Range    Urine Culture, Routine No growth 24 hours    Comprehensive Metabolic Panel   Result Value Ref Range    Sodium 131 (L) 135 - 144 mEq/L    Potassium 2.8 (LL) 3.4 - 4.9 mEq/L Chloride 90 (L) 95 - 107 mEq/L    CO2 25 20 - 31 mEq/L    Anion Gap 16 (H) 9 - 15 mEq/L    Glucose 109 (H) 70 - 99 mg/dL    BUN 21 8 - 23 mg/dL    CREATININE 1.15 (H) 0.50 - 0.90 mg/dL    GFR Non-African American 46.1 (L) >60    GFR  55.8 (L) >60    Calcium 9.3 8.5 - 9.9 mg/dL    Total Protein 7.4 6.3 - 8.0 g/dL    Alb 3.7 3.5 - 4.6 g/dL    Total Bilirubin 0.9 (H) 0.2 - 0.7 mg/dL    Alkaline Phosphatase 64 40 - 130 U/L    ALT 38 (H) 0 - 33 U/L    AST 37 (H) 0 - 35 U/L    Globulin 3.7 (H) 2.3 - 3.5 g/dL   CBC Auto Differential   Result Value Ref Range    WBC 5.9 4.8 - 10.8 K/uL    RBC 4.33 4.20 - 5.40 M/uL    Hemoglobin 14.0 12.0 - 16.0 g/dL    Hematocrit 40.0 37.0 - 47.0 %    MCV 92.3 82.0 - 100.0 fL    MCH 32.4 (H) 27.0 - 31.3 pg    MCHC 35.1 33.0 - 37.0 %    RDW 12.7 11.5 - 14.5 %    Platelets 999 236 - 649 K/uL    Neutrophils % 62.3 %    Lymphocytes % 23.8 %    Monocytes % 12.7 %    Eosinophils % 0.9 %    Basophils % 0.3 %    Neutrophils # 3.7 1.4 - 6.5 K/uL    Lymphocytes # 1.4 1.0 - 4.8 K/uL    Monocytes # 0.7 0.2 - 0.8 K/uL    Eosinophils # 0.1 0.0 - 0.7 K/uL    Basophils # 0.0 0.0 - 0.2 K/uL   Magnesium   Result Value Ref Range    Magnesium 2.1 1.7 - 2.4 mg/dL   Urine Reflex to Culture   Result Value Ref Range    Color, UA Yellow Straw/Yellow    Clarity, UA Clear Clear    Glucose, Ur Negative Negative mg/dL    Bilirubin Urine Negative Negative    Ketones, Urine Negative Negative mg/dL    Specific Gravity, UA 1.010 1.005 - 1.030    Blood, Urine Negative Negative    pH, UA 7.0 5.0 - 9.0    Protein, UA 30 (A) Negative mg/dL    Urobilinogen, Urine 0.2 <2.0 E.U./dL    Nitrite, Urine Negative Negative    Leukocyte Esterase, Urine Negative Negative    Urine Reflex to Culture YES    Microscopic Urinalysis   Result Value Ref Range    WBC, UA 0-2 0 - 5 /HPF    RBC, UA 0-2 0 - 2 /HPF    Epi Cells 0-2 /HPF    Bacteria, UA Rare /HPF   CBC   Result Value Ref Range    WBC 6.0 4.8 - 10.8 K/uL    RBC 4.00 (L) 4.20 - 5.40 M/uL    Hemoglobin 12.8 12.0 - 16.0 g/dL    Hematocrit 37.6 37.0 - 47.0 %    MCV 94.1 82.0 - 100.0 fL    MCH 31.9 (H) 27.0 - 31.3 pg    MCHC 33.9 33.0 - 37.0 %    RDW 12.5 11.5 - 14.5 %    Platelets 107 650 - 913 K/uL   Basic Metabolic Panel w/ Reflex to MG   Result Value Ref Range    Sodium 136 135 - 144 mEq/L    Potassium reflex Magnesium 3.7 3.4 - 4.9 mEq/L    Chloride 98 95 - 107 mEq/L    CO2 23 20 - 31 mEq/L    Anion Gap 15 9 - 15 mEq/L    Glucose 145 (H) 70 - 99 mg/dL    BUN 16 8 - 23 mg/dL    CREATININE 0.96 (H) 0.50 - 0.90 mg/dL    GFR Non-African American 56.8 (L) >60    GFR  >60.0 >60    Calcium 9.3 8.5 - 9.9 mg/dL   CBC Auto Differential   Result Value Ref Range    WBC 10.7 4.8 - 10.8 K/uL    RBC 3.94 (L) 4.20 - 5.40 M/uL    Hemoglobin 12.7 12.0 - 16.0 g/dL    Hematocrit 36.9 (L) 37.0 - 47.0 %    MCV 93.7 82.0 - 100.0 fL    MCH 32.3 (H) 27.0 - 31.3 pg    MCHC 34.4 33.0 - 37.0 %    RDW 12.6 11.5 - 14.5 %    Platelets 167 592 - 466 K/uL    Neutrophils % 75.4 %    Lymphocytes % 14.1 %    Monocytes % 10.0 %    Eosinophils % 0.2 %    Basophils % 0.3 %    Neutrophils # 8.0 (H) 1.4 - 6.5 K/uL    Lymphocytes # 1.5 1.0 - 4.8 K/uL    Monocytes # 1.1 (H) 0.2 - 0.8 K/uL    Eosinophils # 0.0 0.0 - 0.7 K/uL    Basophils # 0.0 0.0 - 0.2 K/uL   Basic Metabolic Panel   Result Value Ref Range    Sodium 136 135 - 144 mEq/L    Potassium 3.8 3.4 - 4.9 mEq/L    Chloride 98 95 - 107 mEq/L    CO2 24 20 - 31 mEq/L    Anion Gap 14 9 - 15 mEq/L    Glucose 95 70 - 99 mg/dL    BUN 18 8 - 23 mg/dL    CREATININE 1.08 (H) 0.50 - 0.90 mg/dL    GFR Non-African American 49.6 (L) >60    GFR  60.0 >60    Calcium 9.6 8.5 - 9.9 mg/dL       Diet:  DIET GENERAL;     Activity:  Activity as tolerated (Patient may move about with assist as indicated or with supervision.)    Follow-up:  in 1 weeks with Rosa Rodriguez MD,      Disposition: home    Condition: Stable    Time Spent: 39 minutes    Electronically signed by Kristen Navarro MD on 4/8/2019 at 8:09 AM    Discharging Hospitalist

## 2019-04-29 ENCOUNTER — HOSPITAL ENCOUNTER (OUTPATIENT)
Dept: WOMENS IMAGING | Age: 74
Discharge: HOME | End: 2019-05-01
Payer: COMMERCIAL

## 2019-04-29 DIAGNOSIS — Z12.39 BREAST CANCER SCREENING: ICD-10-CM

## 2019-04-29 PROCEDURE — 77067 SCR MAMMO BI INCL CAD: CPT

## 2019-12-01 ENCOUNTER — APPOINTMENT (OUTPATIENT)
Dept: GENERAL RADIOLOGY | Age: 74
End: 2019-12-01
Payer: MEDICARE

## 2019-12-01 ENCOUNTER — HOSPITAL ENCOUNTER (EMERGENCY)
Age: 74
Discharge: HOME | End: 2019-12-01
Attending: EMERGENCY MEDICINE
Payer: MEDICARE

## 2019-12-01 VITALS
TEMPERATURE: 97.7 F | DIASTOLIC BLOOD PRESSURE: 73 MMHG | HEIGHT: 66 IN | RESPIRATION RATE: 18 BRPM | SYSTOLIC BLOOD PRESSURE: 168 MMHG | OXYGEN SATURATION: 93 % | HEART RATE: 83 BPM | WEIGHT: 280 LBS | BODY MASS INDEX: 45 KG/M2

## 2019-12-01 DIAGNOSIS — S80.02XA CONTUSION OF LEFT KNEE, INITIAL ENCOUNTER: ICD-10-CM

## 2019-12-01 DIAGNOSIS — S92.301A CLOSED NONDISPLACED FRACTURE OF METATARSAL BONE OF RIGHT FOOT, UNSPECIFIED METATARSAL, INITIAL ENCOUNTER: ICD-10-CM
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
## 2019-12-01 DIAGNOSIS — T07.XXXA MULTIPLE CONTUSIONS: ICD-10-CM

## 2019-12-01 PROCEDURE — 6370000000 HC RX 637 (ALT 250 FOR IP): Performed by: EMERGENCY MEDICINE

## 2019-12-01 PROCEDURE — 90715 TDAP VACCINE 7 YRS/> IM: CPT | Performed by: EMERGENCY MEDICINE

## 2019-12-01 PROCEDURE — 73630 X-RAY EXAM OF FOOT: CPT

## 2019-12-01 PROCEDURE — 99283 EMERGENCY DEPT VISIT LOW MDM: CPT

## 2019-12-01 PROCEDURE — 6360000002 HC RX W HCPCS: Performed by: EMERGENCY MEDICINE

## 2019-12-01 PROCEDURE — 90471 IMMUNIZATION ADMIN: CPT | Performed by: EMERGENCY MEDICINE

## 2019-12-01 PROCEDURE — 73562 X-RAY EXAM OF KNEE 3: CPT

## 2019-12-01 RX ORDER — ACETAMINOPHEN 500 MG
500 TABLET ORAL 4 TIMES DAILY PRN
Qty: 60 TABLET | Refills: 0 | Status: SHIPPED | OUTPATIENT
Start: 2019-12-01 | End: 2021-01-01

## 2019-12-01 RX ORDER — ACETAMINOPHEN 500 MG
1000 TABLET ORAL ONCE
Status: COMPLETED | OUTPATIENT
Start: 2019-12-01 | End: 2019-12-01

## 2019-12-01 RX ORDER — KETOROLAC TROMETHAMINE 30 MG/ML
60 INJECTION, SOLUTION INTRAMUSCULAR; INTRAVENOUS ONCE
Status: DISCONTINUED | OUTPATIENT
Start: 2019-12-01 | End: 2019-12-01

## 2019-12-01 RX ORDER — ORPHENADRINE CITRATE 100 MG/1
100 TABLET, EXTENDED RELEASE ORAL 2 TIMES DAILY
Qty: 20 TABLET | Refills: 0 | Status: SHIPPED | OUTPATIENT
Start: 2019-12-01 | End: 2019-12-11

## 2019-12-01 RX ADMIN — TETANUS TOXOID, REDUCED DIPHTHERIA TOXOID AND ACELLULAR PERTUSSIS VACCINE, ADSORBED 0.5 ML: 5; 2.5; 8; 8; 2.5 SUSPENSION INTRAMUSCULAR at 12:34

## 2019-12-01 RX ADMIN — ACETAMINOPHEN 1000 MG: 500 TABLET ORAL at 12:53

## 2019-12-01 ASSESSMENT — PAIN SCALES - GENERAL
PAINLEVEL_OUTOF10: 8
PAINLEVEL_OUTOF10: 8
PAINLEVEL_OUTOF10: 0

## 2019-12-01 ASSESSMENT — ENCOUNTER SYMPTOMS
DIARRHEA: 0
RHINORRHEA: 0
VOMITING: 0
NAUSEA: 0
CONSTIPATION: 0
SINUS PRESSURE: 0
PHOTOPHOBIA: 0
BACK PAIN: 0
WHEEZING: 0
APNEA: 0
ABDOMINAL DISTENTION: 0
ABDOMINAL PAIN: 0
EYE PAIN: 0
SHORTNESS OF BREATH: 0
COUGH: 0
SORE THROAT: 0
COLOR CHANGE: 0

## 2019-12-01 ASSESSMENT — PAIN DESCRIPTION - ONSET: ONSET: ON-GOING

## 2019-12-01 ASSESSMENT — PAIN DESCRIPTION - DESCRIPTORS: DESCRIPTORS: ACHING

## 2019-12-01 ASSESSMENT — PAIN DESCRIPTION - ORIENTATION: ORIENTATION: LEFT

## 2021-01-01 ENCOUNTER — APPOINTMENT (OUTPATIENT)
Dept: ULTRASOUND IMAGING | Age: 76
DRG: 207 | End: 2021-01-01
Attending: INTERNAL MEDICINE
Payer: MEDICARE

## 2021-01-01 ENCOUNTER — APPOINTMENT (OUTPATIENT)
Dept: GENERAL RADIOLOGY | Age: 76
DRG: 207 | End: 2021-01-01
Attending: INTERNAL MEDICINE
Payer: MEDICARE

## 2021-01-01 ENCOUNTER — HOSPITAL ENCOUNTER (EMERGENCY)
Age: 76
Discharge: HOME | End: 2021-07-24
Attending: EMERGENCY MEDICINE
Payer: MEDICARE

## 2021-01-01 ENCOUNTER — APPOINTMENT (OUTPATIENT)
Dept: CT IMAGING | Age: 76
End: 2021-01-01
Payer: MEDICARE

## 2021-01-01 ENCOUNTER — HOSPITAL ENCOUNTER (INPATIENT)
Age: 76
LOS: 11 days | DRG: 207 | End: 2021-11-07
Attending: INTERNAL MEDICINE | Admitting: INTERNAL MEDICINE
Payer: MEDICARE

## 2021-01-01 ENCOUNTER — OFFICE VISIT (OUTPATIENT)
Dept: INTERNAL MEDICINE | Age: 76
End: 2021-01-01
Payer: MEDICARE

## 2021-01-01 ENCOUNTER — APPOINTMENT (OUTPATIENT)
Dept: ULTRASOUND IMAGING | Age: 76
End: 2021-01-01
Payer: MEDICARE

## 2021-01-01 ENCOUNTER — APPOINTMENT (OUTPATIENT)
Dept: GENERAL RADIOLOGY | Age: 76
End: 2021-01-01
Payer: MEDICARE

## 2021-01-01 ENCOUNTER — APPOINTMENT (OUTPATIENT)
Dept: CT IMAGING | Age: 76
DRG: 207 | End: 2021-01-01
Attending: INTERNAL MEDICINE
Payer: MEDICARE

## 2021-01-01 ENCOUNTER — HOSPITAL ENCOUNTER (EMERGENCY)
Age: 76
Discharge: HOME | End: 2021-08-27
Attending: EMERGENCY MEDICINE
Payer: MEDICARE

## 2021-01-01 ENCOUNTER — HOSPITAL ENCOUNTER (EMERGENCY)
Age: 76
Discharge: ANOTHER HOSPITAL | End: 2021-10-27
Attending: EMERGENCY MEDICINE
Payer: MEDICARE

## 2021-01-01 VITALS
WEIGHT: 280 LBS | HEIGHT: 66 IN | DIASTOLIC BLOOD PRESSURE: 60 MMHG | OXYGEN SATURATION: 97 % | BODY MASS INDEX: 45 KG/M2 | SYSTOLIC BLOOD PRESSURE: 122 MMHG | TEMPERATURE: 98.6 F | HEART RATE: 78 BPM | RESPIRATION RATE: 16 BRPM

## 2021-01-01 VITALS
OXYGEN SATURATION: 96 % | HEIGHT: 65 IN | WEIGHT: 283.95 LBS | HEART RATE: 92 BPM | TEMPERATURE: 96.8 F | BODY MASS INDEX: 47.31 KG/M2 | DIASTOLIC BLOOD PRESSURE: 26 MMHG | RESPIRATION RATE: 32 BRPM | SYSTOLIC BLOOD PRESSURE: 76 MMHG

## 2021-01-01 VITALS
SYSTOLIC BLOOD PRESSURE: 155 MMHG | HEART RATE: 90 BPM | WEIGHT: 280 LBS | BODY MASS INDEX: 46.65 KG/M2 | TEMPERATURE: 98.2 F | RESPIRATION RATE: 18 BRPM | HEIGHT: 65 IN | DIASTOLIC BLOOD PRESSURE: 80 MMHG | OXYGEN SATURATION: 95 %

## 2021-01-01 VITALS
RESPIRATION RATE: 18 BRPM | HEIGHT: 65 IN | OXYGEN SATURATION: 94 % | TEMPERATURE: 99 F | DIASTOLIC BLOOD PRESSURE: 50 MMHG | HEART RATE: 83 BPM | BODY MASS INDEX: 48.15 KG/M2 | WEIGHT: 289 LBS | SYSTOLIC BLOOD PRESSURE: 154 MMHG

## 2021-01-01 VITALS
HEIGHT: 65 IN | BODY MASS INDEX: 46.59 KG/M2 | DIASTOLIC BLOOD PRESSURE: 72 MMHG | TEMPERATURE: 100.5 F | HEART RATE: 82 BPM | OXYGEN SATURATION: 90 % | RESPIRATION RATE: 20 BRPM | SYSTOLIC BLOOD PRESSURE: 144 MMHG

## 2021-01-01 DIAGNOSIS — R06.02 SHORTNESS OF BREATH: Primary | ICD-10-CM

## 2021-01-01 DIAGNOSIS — R60.0 BILATERAL LOWER EXTREMITY EDEMA: Primary | ICD-10-CM

## 2021-01-01 DIAGNOSIS — R09.02 HYPOXIA: Primary | ICD-10-CM

## 2021-01-01 DIAGNOSIS — J45.41 MODERATE PERSISTENT ASTHMA WITH EXACERBATION: Primary | ICD-10-CM

## 2021-01-01 DIAGNOSIS — U07.1 COVID-19 VIRUS INFECTION: ICD-10-CM

## 2021-01-01 DIAGNOSIS — J45.20 MILD INTERMITTENT ASTHMA WITHOUT COMPLICATION: ICD-10-CM

## 2021-01-01 LAB
ALBUMIN SERPL-MCNC: 2 G/DL (ref 3.5–4.6)
ALBUMIN SERPL-MCNC: 2.5 G/DL (ref 3.5–4.6)
ALBUMIN SERPL-MCNC: 2.5 G/DL (ref 3.5–4.6)
ALBUMIN SERPL-MCNC: 2.6 G/DL (ref 3.5–4.6)
ALBUMIN SERPL-MCNC: 2.8 G/DL (ref 3.5–4.6)
ALBUMIN SERPL-MCNC: 2.9 G/DL (ref 3.5–4.6)
ALBUMIN SERPL-MCNC: 3 G/DL (ref 3.5–4.6)
ALBUMIN SERPL-MCNC: 3 G/DL (ref 3.5–4.6)
ALBUMIN SERPL-MCNC: 3.1 G/DL (ref 3.5–4.6)
ALBUMIN SERPL-MCNC: 3.1 G/DL (ref 3.5–4.6)
ALBUMIN SERPL-MCNC: 3.2 G/DL (ref 3.5–4.6)
ALBUMIN SERPL-MCNC: 3.2 G/DL (ref 3.5–4.6)
ALBUMIN SERPL-MCNC: 3.7 G/DL (ref 3.5–4.6)
ALBUMIN SERPL-MCNC: 4 G/DL (ref 3.5–4.6)
ALP BLD-CCNC: 123 U/L (ref 40–130)
ALP BLD-CCNC: 233 U/L (ref 40–130)
ALP BLD-CCNC: 49 U/L (ref 40–130)
ALP BLD-CCNC: 50 U/L (ref 40–130)
ALP BLD-CCNC: 51 U/L (ref 40–130)
ALP BLD-CCNC: 55 U/L (ref 40–130)
ALP BLD-CCNC: 55 U/L (ref 40–130)
ALP BLD-CCNC: 56 U/L (ref 40–130)
ALP BLD-CCNC: 62 U/L (ref 40–130)
ALP BLD-CCNC: 65 U/L (ref 40–130)
ALP BLD-CCNC: 66 U/L (ref 40–130)
ALP BLD-CCNC: 66 U/L (ref 40–130)
ALP BLD-CCNC: 67 U/L (ref 40–130)
ALP BLD-CCNC: 71 U/L (ref 40–130)
ALP BLD-CCNC: 75 U/L (ref 40–130)
ALT SERPL-CCNC: 1024 U/L (ref 0–33)
ALT SERPL-CCNC: 24 U/L (ref 0–33)
ALT SERPL-CCNC: 28 U/L (ref 0–33)
ALT SERPL-CCNC: 28 U/L (ref 0–33)
ALT SERPL-CCNC: 29 U/L (ref 0–33)
ALT SERPL-CCNC: 30 U/L (ref 0–33)
ALT SERPL-CCNC: 30 U/L (ref 0–33)
ALT SERPL-CCNC: 37 U/L (ref 0–33)
ALT SERPL-CCNC: 41 U/L (ref 0–33)
ALT SERPL-CCNC: 41 U/L (ref 0–33)
ALT SERPL-CCNC: 43 U/L (ref 0–33)
ALT SERPL-CCNC: 43 U/L (ref 0–33)
ALT SERPL-CCNC: 46 U/L (ref 0–33)
ALT SERPL-CCNC: 49 U/L (ref 0–33)
ALT SERPL-CCNC: 56 U/L (ref 0–33)
ANION GAP SERPL CALCULATED.3IONS-SCNC: 12 MEQ/L (ref 9–15)
ANION GAP SERPL CALCULATED.3IONS-SCNC: 13 MEQ/L (ref 9–15)
ANION GAP SERPL CALCULATED.3IONS-SCNC: 13 MEQ/L (ref 9–15)
ANION GAP SERPL CALCULATED.3IONS-SCNC: 14 MEQ/L (ref 9–15)
ANION GAP SERPL CALCULATED.3IONS-SCNC: 15 MEQ/L (ref 9–15)
ANION GAP SERPL CALCULATED.3IONS-SCNC: 16 MEQ/L (ref 9–15)
ANION GAP SERPL CALCULATED.3IONS-SCNC: 17 MEQ/L (ref 9–15)
ANION GAP SERPL CALCULATED.3IONS-SCNC: 18 MEQ/L (ref 9–15)
ANION GAP SERPL CALCULATED.3IONS-SCNC: 20 MEQ/L (ref 9–15)
ANION GAP SERPL CALCULATED.3IONS-SCNC: 23 MEQ/L (ref 9–15)
ANISOCYTOSIS: ABNORMAL
AST SERPL-CCNC: 1509 U/L (ref 0–35)
AST SERPL-CCNC: 31 U/L (ref 0–35)
AST SERPL-CCNC: 34 U/L (ref 0–35)
AST SERPL-CCNC: 36 U/L (ref 0–35)
AST SERPL-CCNC: 37 U/L (ref 0–35)
AST SERPL-CCNC: 38 U/L (ref 0–35)
AST SERPL-CCNC: 38 U/L (ref 0–35)
AST SERPL-CCNC: 44 U/L (ref 0–35)
AST SERPL-CCNC: 45 U/L (ref 0–35)
AST SERPL-CCNC: 47 U/L (ref 0–35)
AST SERPL-CCNC: 53 U/L (ref 0–35)
AST SERPL-CCNC: 56 U/L (ref 0–35)
AST SERPL-CCNC: 58 U/L (ref 0–35)
AST SERPL-CCNC: 58 U/L (ref 0–35)
AST SERPL-CCNC: 61 U/L (ref 0–35)
ATYPICAL LYMPHOCYTE RELATIVE PERCENT: 1 %
BACTERIA: ABNORMAL /HPF
BACTERIA: NEGATIVE /HPF
BANDED NEUTROPHILS RELATIVE PERCENT: 40 % (ref 5–11)
BASE EXCESS ARTERIAL: -12 (ref -3–3)
BASE EXCESS ARTERIAL: -18 (ref -3–3)
BASE EXCESS ARTERIAL: -2 (ref -3–3)
BASE EXCESS ARTERIAL: -24 (ref -3–3)
BASE EXCESS ARTERIAL: -24 (ref -3–3)
BASE EXCESS ARTERIAL: -3 (ref -3–3)
BASE EXCESS ARTERIAL: -3 (ref -3–3)
BASE EXCESS ARTERIAL: -5
BASE EXCESS ARTERIAL: -5 (ref -3–3)
BASE EXCESS ARTERIAL: -6 (ref -3–3)
BASE EXCESS ARTERIAL: -7 (ref -3–3)
BASE EXCESS ARTERIAL: -8 (ref -3–3)
BASE EXCESS VENOUS: -6 (ref -3–3)
BASOPHILS ABSOLUTE: 0 K/UL (ref 0–0.1)
BASOPHILS ABSOLUTE: 0 K/UL (ref 0–0.2)
BASOPHILS ABSOLUTE: 0.1 K/UL (ref 0–0.1)
BASOPHILS ABSOLUTE: 0.1 K/UL (ref 0–0.1)
BASOPHILS ABSOLUTE: 0.1 K/UL (ref 0–0.2)
BASOPHILS ABSOLUTE: 0.2 K/UL (ref 0–0.2)
BASOPHILS RELATIVE PERCENT: 0.1 %
BASOPHILS RELATIVE PERCENT: 0.2 %
BASOPHILS RELATIVE PERCENT: 0.2 % (ref 0.1–1.2)
BASOPHILS RELATIVE PERCENT: 0.3 %
BASOPHILS RELATIVE PERCENT: 0.7 % (ref 0.1–1.2)
BASOPHILS RELATIVE PERCENT: 0.9 % (ref 0.1–1.2)
BASOPHILS RELATIVE PERCENT: 1 %
BILIRUB SERPL-MCNC: 0.4 MG/DL (ref 0.2–0.7)
BILIRUB SERPL-MCNC: 0.5 MG/DL (ref 0.2–0.7)
BILIRUB SERPL-MCNC: 0.6 MG/DL (ref 0.2–0.7)
BILIRUB SERPL-MCNC: 0.7 MG/DL (ref 0.2–0.7)
BILIRUB SERPL-MCNC: 0.8 MG/DL (ref 0.2–0.7)
BILIRUB SERPL-MCNC: 0.8 MG/DL (ref 0.2–0.7)
BILIRUBIN URINE: NEGATIVE
BILIRUBIN URINE: NEGATIVE
BLOOD CULTURE, ROUTINE: NORMAL
BLOOD CULTURE, ROUTINE: NORMAL
BLOOD, URINE: ABNORMAL
BLOOD, URINE: NORMAL
BUN BLDV-MCNC: 16 MG/DL (ref 8–23)
BUN BLDV-MCNC: 18 MG/DL (ref 8–23)
BUN BLDV-MCNC: 19 MG/DL (ref 8–23)
BUN BLDV-MCNC: 19 MG/DL (ref 8–23)
BUN BLDV-MCNC: 22 MG/DL (ref 8–23)
BUN BLDV-MCNC: 23 MG/DL (ref 8–23)
BUN BLDV-MCNC: 30 MG/DL (ref 8–23)
BUN BLDV-MCNC: 38 MG/DL (ref 8–23)
BUN BLDV-MCNC: 55 MG/DL (ref 8–23)
BUN BLDV-MCNC: 71 MG/DL (ref 8–23)
BUN BLDV-MCNC: 74 MG/DL (ref 8–23)
BUN BLDV-MCNC: 75 MG/DL (ref 8–23)
BUN BLDV-MCNC: 79 MG/DL (ref 8–23)
BUN BLDV-MCNC: 84 MG/DL (ref 8–23)
BUN BLDV-MCNC: 85 MG/DL (ref 8–23)
BUN BLDV-MCNC: 86 MG/DL (ref 8–23)
C-REACTIVE PROTEIN: 33 MG/L (ref 0–5)
C-REACTIVE PROTEIN: 59.5 MG/L (ref 0–5)
CALCIUM IONIZED: 1.08 MMOL/L (ref 1.12–1.32)
CALCIUM IONIZED: 1.1 MMOL/L (ref 1.12–1.32)
CALCIUM IONIZED: 1.12 MMOL/L (ref 1.12–1.32)
CALCIUM IONIZED: 1.13 MMOL/L (ref 1.12–1.32)
CALCIUM IONIZED: 1.15 MMOL/L (ref 1.12–1.32)
CALCIUM IONIZED: 1.16 MMOL/L (ref 1.12–1.32)
CALCIUM IONIZED: 1.16 MMOL/L (ref 1.12–1.32)
CALCIUM IONIZED: 1.17 MMOL/L (ref 1.12–1.32)
CALCIUM IONIZED: 1.17 MMOL/L (ref 1.12–1.32)
CALCIUM IONIZED: 1.18 MMOL/L (ref 1.12–1.32)
CALCIUM IONIZED: 1.18 MMOL/L (ref 1.12–1.32)
CALCIUM IONIZED: 1.19 MMOL/L (ref 1.12–1.32)
CALCIUM IONIZED: 1.19 MMOL/L (ref 1.12–1.32)
CALCIUM IONIZED: 1.2 MMOL/L (ref 1.12–1.32)
CALCIUM IONIZED: 1.21 MMOL/L (ref 1.12–1.32)
CALCIUM IONIZED: 1.22 MMOL/L (ref 1.12–1.32)
CALCIUM IONIZED: 1.23 MMOL/L (ref 1.12–1.32)
CALCIUM IONIZED: 1.24 MMOL/L (ref 1.12–1.32)
CALCIUM IONIZED: 1.24 MMOL/L (ref 1.12–1.32)
CALCIUM IONIZED: 1.25 MMOL/L (ref 1.12–1.32)
CALCIUM IONIZED: 1.25 MMOL/L (ref 1.12–1.32)
CALCIUM IONIZED: 1.26 MMOL/L (ref 1.12–1.32)
CALCIUM IONIZED: 1.26 MMOL/L (ref 1.12–1.32)
CALCIUM IONIZED: 1.27 MMOL/L (ref 1.12–1.32)
CALCIUM IONIZED: 1.28 MMOL/L (ref 1.12–1.32)
CALCIUM SERPL-MCNC: 7.9 MG/DL (ref 8.5–9.9)
CALCIUM SERPL-MCNC: 8.1 MG/DL (ref 8.5–9.9)
CALCIUM SERPL-MCNC: 8.2 MG/DL (ref 8.5–9.9)
CALCIUM SERPL-MCNC: 8.3 MG/DL (ref 8.5–9.9)
CALCIUM SERPL-MCNC: 8.5 MG/DL (ref 8.5–9.9)
CALCIUM SERPL-MCNC: 8.6 MG/DL (ref 8.5–9.9)
CALCIUM SERPL-MCNC: 9.3 MG/DL (ref 8.5–9.9)
CALCIUM SERPL-MCNC: 9.8 MG/DL (ref 8.5–9.9)
CHLORIDE BLD-SCNC: 100 MEQ/L (ref 95–107)
CHLORIDE BLD-SCNC: 101 MEQ/L (ref 95–107)
CHLORIDE BLD-SCNC: 102 MEQ/L (ref 95–107)
CHLORIDE BLD-SCNC: 104 MEQ/L (ref 95–107)
CHLORIDE BLD-SCNC: 105 MEQ/L (ref 95–107)
CHLORIDE BLD-SCNC: 93 MEQ/L (ref 95–107)
CHLORIDE BLD-SCNC: 94 MEQ/L (ref 95–107)
CHLORIDE BLD-SCNC: 96 MEQ/L (ref 95–107)
CHLORIDE BLD-SCNC: 97 MEQ/L (ref 95–107)
CHLORIDE BLD-SCNC: 97 MEQ/L (ref 95–107)
CHLORIDE BLD-SCNC: 98 MEQ/L (ref 95–107)
CHLORIDE BLD-SCNC: 99 MEQ/L (ref 95–107)
CLARITY: CLEAR
CLARITY: CLEAR
CO2: 16 MEQ/L (ref 20–31)
CO2: 17 MEQ/L (ref 20–31)
CO2: 18 MEQ/L (ref 20–31)
CO2: 19 MEQ/L (ref 20–31)
CO2: 20 MEQ/L (ref 20–31)
CO2: 23 MEQ/L (ref 20–31)
CO2: 23 MEQ/L (ref 20–31)
CO2: 7 MEQ/L (ref 20–31)
COLOR: YELLOW
COLOR: YELLOW
CREAT SERPL-MCNC: 0.82 MG/DL (ref 0.5–0.9)
CREAT SERPL-MCNC: 0.89 MG/DL (ref 0.5–0.9)
CREAT SERPL-MCNC: 0.93 MG/DL (ref 0.5–0.9)
CREAT SERPL-MCNC: 1.01 MG/DL (ref 0.5–0.9)
CREAT SERPL-MCNC: 1.13 MG/DL (ref 0.5–0.9)
CREAT SERPL-MCNC: 1.25 MG/DL (ref 0.5–0.9)
CREAT SERPL-MCNC: 1.35 MG/DL (ref 0.5–0.9)
CREAT SERPL-MCNC: 1.51 MG/DL (ref 0.5–0.9)
CREAT SERPL-MCNC: 1.72 MG/DL (ref 0.5–0.9)
CREAT SERPL-MCNC: 1.95 MG/DL (ref 0.5–0.9)
CREAT SERPL-MCNC: 2.16 MG/DL (ref 0.5–0.9)
CREAT SERPL-MCNC: 2.43 MG/DL (ref 0.5–0.9)
CREAT SERPL-MCNC: 2.49 MG/DL (ref 0.5–0.9)
CREAT SERPL-MCNC: 2.5 MG/DL (ref 0.5–0.9)
CREAT SERPL-MCNC: 2.58 MG/DL (ref 0.5–0.9)
CREAT SERPL-MCNC: 3.57 MG/DL (ref 0.5–0.9)
CULTURE, BLOOD 2: ABNORMAL
CULTURE, BLOOD 2: NORMAL
D DIMER: 1.05 MG/L FEU (ref 0–0.5)
D DIMER: 1.17 MG/L FEU (ref 0–0.5)
D DIMER: 1.59 MG/L FEU (ref 0–0.5)
D DIMER: 2.3 MG/L FEU (ref 0–0.5)
D DIMER: 4.81 MG/L FEU (ref 0–0.5)
EKG ATRIAL RATE: 77 BPM
EKG ATRIAL RATE: 78 BPM
EKG ATRIAL RATE: 84 BPM
EKG P AXIS: 38 DEGREES
EKG P AXIS: 47 DEGREES
EKG P AXIS: 51 DEGREES
EKG P-R INTERVAL: 150 MS
EKG P-R INTERVAL: 154 MS
EKG P-R INTERVAL: 160 MS
EKG Q-T INTERVAL: 370 MS
EKG Q-T INTERVAL: 378 MS
EKG Q-T INTERVAL: 400 MS
EKG QRS DURATION: 86 MS
EKG QRS DURATION: 90 MS
EKG QRS DURATION: 90 MS
EKG QTC CALCULATION (BAZETT): 430 MS
EKG QTC CALCULATION (BAZETT): 437 MS
EKG QTC CALCULATION (BAZETT): 452 MS
EKG R AXIS: -1 DEGREES
EKG R AXIS: -2 DEGREES
EKG R AXIS: 3 DEGREES
EKG T AXIS: 52 DEGREES
EKG T AXIS: 54 DEGREES
EKG T AXIS: 63 DEGREES
EKG VENTRICULAR RATE: 77 BPM
EKG VENTRICULAR RATE: 78 BPM
EKG VENTRICULAR RATE: 84 BPM
EOSINOPHILS ABSOLUTE: 0 K/UL (ref 0–0.4)
EOSINOPHILS ABSOLUTE: 0 K/UL (ref 0–0.7)
EOSINOPHILS ABSOLUTE: 0.1 K/UL (ref 0–0.7)
EOSINOPHILS ABSOLUTE: 0.2 K/UL (ref 0–0.4)
EOSINOPHILS ABSOLUTE: 0.3 K/UL (ref 0–0.4)
EOSINOPHILS RELATIVE PERCENT: 0 %
EOSINOPHILS RELATIVE PERCENT: 0 %
EOSINOPHILS RELATIVE PERCENT: 0 % (ref 0.7–5.8)
EOSINOPHILS RELATIVE PERCENT: 0.1 %
EOSINOPHILS RELATIVE PERCENT: 0.4 %
EOSINOPHILS RELATIVE PERCENT: 0.5 %
EOSINOPHILS RELATIVE PERCENT: 0.5 %
EOSINOPHILS RELATIVE PERCENT: 0.7 %
EOSINOPHILS RELATIVE PERCENT: 0.8 %
EOSINOPHILS RELATIVE PERCENT: 0.9 %
EOSINOPHILS RELATIVE PERCENT: 2.8 % (ref 0.7–5.8)
EOSINOPHILS RELATIVE PERCENT: 3.3 % (ref 0.7–5.8)
EPITHELIAL CELLS, UA: ABNORMAL /HPF
EPITHELIAL CELLS, UA: NORMAL /HPF
FIBRINOGEN: 548 MG/DL (ref 235–507)
GFR AFRICAN AMERICAN: 12
GFR AFRICAN AMERICAN: 15
GFR AFRICAN AMERICAN: 15
GFR AFRICAN AMERICAN: 18
GFR AFRICAN AMERICAN: 21
GFR AFRICAN AMERICAN: 21.8
GFR AFRICAN AMERICAN: 22.6
GFR AFRICAN AMERICAN: 22.7
GFR AFRICAN AMERICAN: 23
GFR AFRICAN AMERICAN: 23.4
GFR AFRICAN AMERICAN: 24
GFR AFRICAN AMERICAN: 25
GFR AFRICAN AMERICAN: 26
GFR AFRICAN AMERICAN: 26.8
GFR AFRICAN AMERICAN: 28
GFR AFRICAN AMERICAN: 30.1
GFR AFRICAN AMERICAN: 33
GFR AFRICAN AMERICAN: 34.8
GFR AFRICAN AMERICAN: 35
GFR AFRICAN AMERICAN: 40.5
GFR AFRICAN AMERICAN: 44
GFR AFRICAN AMERICAN: 46.1
GFR AFRICAN AMERICAN: 50.3
GFR AFRICAN AMERICAN: 56.5
GFR AFRICAN AMERICAN: 58
GFR AFRICAN AMERICAN: >60
GFR NON-AFRICAN AMERICAN: 10
GFR NON-AFRICAN AMERICAN: 12.4
GFR NON-AFRICAN AMERICAN: 13
GFR NON-AFRICAN AMERICAN: 15
GFR NON-AFRICAN AMERICAN: 17
GFR NON-AFRICAN AMERICAN: 18
GFR NON-AFRICAN AMERICAN: 18.7
GFR NON-AFRICAN AMERICAN: 18.8
GFR NON-AFRICAN AMERICAN: 19
GFR NON-AFRICAN AMERICAN: 19.3
GFR NON-AFRICAN AMERICAN: 20
GFR NON-AFRICAN AMERICAN: 21
GFR NON-AFRICAN AMERICAN: 22
GFR NON-AFRICAN AMERICAN: 22.1
GFR NON-AFRICAN AMERICAN: 23
GFR NON-AFRICAN AMERICAN: 24.9
GFR NON-AFRICAN AMERICAN: 27
GFR NON-AFRICAN AMERICAN: 28.8
GFR NON-AFRICAN AMERICAN: 29
GFR NON-AFRICAN AMERICAN: 33.5
GFR NON-AFRICAN AMERICAN: 36
GFR NON-AFRICAN AMERICAN: 38.1
GFR NON-AFRICAN AMERICAN: 41.6
GFR NON-AFRICAN AMERICAN: 46.7
GFR NON-AFRICAN AMERICAN: 48
GFR NON-AFRICAN AMERICAN: 53.2
GFR NON-AFRICAN AMERICAN: 54
GFR NON-AFRICAN AMERICAN: 58.5
GFR NON-AFRICAN AMERICAN: >60
GFR NON-AFRICAN AMERICAN: >60
GLOBULIN: 2.1 G/DL (ref 2.3–3.5)
GLOBULIN: 2.2 G/DL (ref 2.3–3.5)
GLOBULIN: 2.3 G/DL (ref 2.3–3.5)
GLOBULIN: 2.3 G/DL (ref 2.3–3.5)
GLOBULIN: 2.4 G/DL (ref 2.3–3.5)
GLOBULIN: 2.4 G/DL (ref 2.3–3.5)
GLOBULIN: 2.5 G/DL (ref 2.3–3.5)
GLOBULIN: 2.5 G/DL (ref 2.3–3.5)
GLOBULIN: 2.6 G/DL (ref 2.3–3.5)
GLOBULIN: 2.7 G/DL (ref 2.3–3.5)
GLOBULIN: 2.8 G/DL (ref 2.3–3.5)
GLOBULIN: 3.1 G/DL (ref 2.3–3.5)
GLOBULIN: 3.2 G/DL (ref 2.3–3.5)
GLOBULIN: 3.3 G/DL (ref 2.3–3.5)
GLOBULIN: 3.6 G/DL (ref 2.3–3.5)
GLUCOSE BLD-MCNC: 103 MG/DL (ref 60–115)
GLUCOSE BLD-MCNC: 104 MG/DL (ref 70–99)
GLUCOSE BLD-MCNC: 104 MG/DL (ref 70–99)
GLUCOSE BLD-MCNC: 105 MG/DL (ref 60–115)
GLUCOSE BLD-MCNC: 106 MG/DL (ref 70–99)
GLUCOSE BLD-MCNC: 107 MG/DL (ref 70–99)
GLUCOSE BLD-MCNC: 111 MG/DL (ref 70–99)
GLUCOSE BLD-MCNC: 112 MG/DL (ref 70–99)
GLUCOSE BLD-MCNC: 114 MG/DL (ref 60–115)
GLUCOSE BLD-MCNC: 114 MG/DL (ref 60–115)
GLUCOSE BLD-MCNC: 121 MG/DL (ref 60–115)
GLUCOSE BLD-MCNC: 121 MG/DL (ref 60–115)
GLUCOSE BLD-MCNC: 121 MG/DL (ref 70–99)
GLUCOSE BLD-MCNC: 122 MG/DL (ref 70–99)
GLUCOSE BLD-MCNC: 125 MG/DL (ref 60–115)
GLUCOSE BLD-MCNC: 127 MG/DL (ref 60–115)
GLUCOSE BLD-MCNC: 127 MG/DL (ref 70–99)
GLUCOSE BLD-MCNC: 129 MG/DL (ref 60–115)
GLUCOSE BLD-MCNC: 133 MG/DL (ref 60–115)
GLUCOSE BLD-MCNC: 134 MG/DL (ref 60–115)
GLUCOSE BLD-MCNC: 140 MG/DL (ref 60–115)
GLUCOSE BLD-MCNC: 142 MG/DL (ref 60–115)
GLUCOSE BLD-MCNC: 143 MG/DL (ref 70–99)
GLUCOSE BLD-MCNC: 148 MG/DL (ref 60–115)
GLUCOSE BLD-MCNC: 150 MG/DL (ref 60–115)
GLUCOSE BLD-MCNC: 152 MG/DL (ref 60–115)
GLUCOSE BLD-MCNC: 156 MG/DL (ref 70–99)
GLUCOSE BLD-MCNC: 167 MG/DL (ref 60–115)
GLUCOSE BLD-MCNC: 169 MG/DL (ref 60–115)
GLUCOSE BLD-MCNC: 171 MG/DL (ref 60–115)
GLUCOSE BLD-MCNC: 174 MG/DL (ref 60–115)
GLUCOSE BLD-MCNC: 176 MG/DL (ref 60–115)
GLUCOSE BLD-MCNC: 177 MG/DL (ref 60–115)
GLUCOSE BLD-MCNC: 183 MG/DL (ref 60–115)
GLUCOSE BLD-MCNC: 183 MG/DL (ref 60–115)
GLUCOSE BLD-MCNC: 191 MG/DL (ref 70–99)
GLUCOSE BLD-MCNC: 207 MG/DL (ref 70–99)
GLUCOSE BLD-MCNC: 210 MG/DL (ref 70–99)
GLUCOSE BLD-MCNC: 227 MG/DL (ref 60–115)
GLUCOSE BLD-MCNC: 234 MG/DL (ref 60–115)
GLUCOSE BLD-MCNC: 88 MG/DL (ref 60–115)
GLUCOSE BLD-MCNC: 92 MG/DL (ref 70–99)
GLUCOSE BLD-MCNC: 94 MG/DL (ref 70–99)
GLUCOSE URINE: NEGATIVE MG/DL
GLUCOSE URINE: NEGATIVE MG/DL
HCO3 ARTERIAL: 12.6 MMOL/L (ref 21–29)
HCO3 ARTERIAL: 17.1 MMOL/L (ref 21–29)
HCO3 ARTERIAL: 17.9 MMOL/L (ref 21–29)
HCO3 ARTERIAL: 18.3 MMOL/L (ref 21–29)
HCO3 ARTERIAL: 18.7 MMOL/L (ref 21–29)
HCO3 ARTERIAL: 19 MMOL/L (ref 21–29)
HCO3 ARTERIAL: 19 MMOL/L (ref 21–29)
HCO3 ARTERIAL: 19.2 MMOL/L (ref 21–29)
HCO3 ARTERIAL: 19.3 MMOL/L (ref 21–29)
HCO3 ARTERIAL: 19.7 MMOL/L (ref 21–29)
HCO3 ARTERIAL: 19.7 MMOL/L (ref 21–29)
HCO3 ARTERIAL: 19.8 MMOL/L (ref 21–29)
HCO3 ARTERIAL: 19.9 MMOL/L (ref 21–29)
HCO3 ARTERIAL: 20 MMOL/L (ref 21–29)
HCO3 ARTERIAL: 20.1 MMOL/L (ref 21–29)
HCO3 ARTERIAL: 20.3 MMOL/L (ref 21–29)
HCO3 ARTERIAL: 20.4 MMOL/L (ref 21–29)
HCO3 ARTERIAL: 20.5 MMOL/L (ref 21–29)
HCO3 ARTERIAL: 20.7 MMOL/L (ref 21–29)
HCO3 ARTERIAL: 21 MMOL/L (ref 21–29)
HCO3 ARTERIAL: 21.7 MMOL/L (ref 21–29)
HCO3 ARTERIAL: 22.1 MMOL/L (ref 21–29)
HCO3 ARTERIAL: 22.5 MMOL/L (ref 21–29)
HCO3 ARTERIAL: 8.9 MMOL/L (ref 21–29)
HCO3 ARTERIAL: 8.9 MMOL/L (ref 21–29)
HCO3 VENOUS: 19.6 MMOL/L (ref 23–29)
HCT VFR BLD CALC: 34.1 % (ref 37–47)
HCT VFR BLD CALC: 34.7 % (ref 37–47)
HCT VFR BLD CALC: 34.7 % (ref 37–47)
HCT VFR BLD CALC: 36.2 % (ref 37–47)
HCT VFR BLD CALC: 37.8 % (ref 37–47)
HCT VFR BLD CALC: 38.1 % (ref 37–47)
HCT VFR BLD CALC: 38.3 % (ref 37–47)
HCT VFR BLD CALC: 38.7 % (ref 37–47)
HCT VFR BLD CALC: 39.5 % (ref 37–47)
HCT VFR BLD CALC: 39.8 % (ref 37–47)
HCT VFR BLD CALC: 40.3 % (ref 37–47)
HCT VFR BLD CALC: 40.4 % (ref 37–47)
HCT VFR BLD CALC: 41.3 % (ref 37–47)
HCT VFR BLD CALC: 41.9 % (ref 37–47)
HCT VFR BLD CALC: 42 % (ref 37–47)
HEMATOLOGY PATH CONSULT: YES
HEMOGLOBIN: 10.3 GM/DL (ref 12–16)
HEMOGLOBIN: 10.9 GM/DL (ref 12–16)
HEMOGLOBIN: 11 G/DL (ref 12–16)
HEMOGLOBIN: 11 GM/DL (ref 12–16)
HEMOGLOBIN: 11.1 GM/DL (ref 12–16)
HEMOGLOBIN: 11.3 G/DL (ref 12–16)
HEMOGLOBIN: 11.4 GM/DL (ref 12–16)
HEMOGLOBIN: 11.4 GM/DL (ref 12–16)
HEMOGLOBIN: 11.6 G/DL (ref 12–16)
HEMOGLOBIN: 11.6 G/DL (ref 12–16)
HEMOGLOBIN: 11.7 G/DL (ref 12–16)
HEMOGLOBIN: 11.9 GM/DL (ref 12–16)
HEMOGLOBIN: 12.1 GM/DL (ref 12–16)
HEMOGLOBIN: 12.1 GM/DL (ref 12–16)
HEMOGLOBIN: 12.2 GM/DL (ref 12–16)
HEMOGLOBIN: 12.3 G/DL (ref 11.2–15.7)
HEMOGLOBIN: 12.3 GM/DL (ref 12–16)
HEMOGLOBIN: 12.4 GM/DL (ref 12–16)
HEMOGLOBIN: 12.5 GM/DL (ref 12–16)
HEMOGLOBIN: 12.6 GM/DL (ref 12–16)
HEMOGLOBIN: 12.7 GM/DL (ref 12–16)
HEMOGLOBIN: 12.7 GM/DL (ref 12–16)
HEMOGLOBIN: 12.8 G/DL (ref 12–16)
HEMOGLOBIN: 12.8 GM/DL (ref 12–16)
HEMOGLOBIN: 12.8 GM/DL (ref 12–16)
HEMOGLOBIN: 13.2 G/DL (ref 11.2–15.7)
HEMOGLOBIN: 13.2 GM/DL (ref 12–16)
HEMOGLOBIN: 13.2 GM/DL (ref 12–16)
HEMOGLOBIN: 13.4 G/DL (ref 12–16)
HEMOGLOBIN: 13.5 G/DL (ref 12–16)
HEMOGLOBIN: 13.5 GM/DL (ref 12–16)
HEMOGLOBIN: 13.6 G/DL (ref 12–16)
HEMOGLOBIN: 13.7 G/DL (ref 11.2–15.7)
HEMOGLOBIN: 13.7 GM/DL (ref 12–16)
HEMOGLOBIN: 13.8 G/DL (ref 12–16)
HEMOGLOBIN: 13.8 GM/DL (ref 12–16)
HEMOGLOBIN: 13.9 G/DL (ref 12–16)
HEMOGLOBIN: 14.3 G/DL (ref 12–16)
HEMOGLOBIN: 14.5 GM/DL (ref 12–16)
IMMATURE GRANULOCYTES #: 0 K/UL
IMMATURE GRANULOCYTES #: 0 K/UL
IMMATURE GRANULOCYTES #: 0.1 K/UL
IMMATURE GRANULOCYTES %: 0.4 %
IMMATURE GRANULOCYTES %: 0.4 %
IMMATURE GRANULOCYTES %: 1.1 %
INR BLD: 1
INR BLD: 1.1
KETONES, URINE: NEGATIVE MG/DL
KETONES, URINE: NEGATIVE MG/DL
L. PNEUMOPHILA SEROGP 1 UR AG: NEGATIVE
LACTATE DEHYDROGENASE: 311 U/L (ref 135–214)
LACTATE: 1.1 MMOL/L (ref 0.4–2)
LACTATE: 1.13 MMOL/L (ref 0.4–2)
LACTATE: 1.18 MMOL/L (ref 0.4–2)
LACTATE: 1.19 MMOL/L (ref 0.4–2)
LACTATE: 1.23 MMOL/L (ref 0.4–2)
LACTATE: 1.27 MMOL/L (ref 0.4–2)
LACTATE: 1.27 MMOL/L (ref 0.4–2)
LACTATE: 1.31 MMOL/L (ref 0.4–2)
LACTATE: 1.35 MMOL/L (ref 0.4–2)
LACTATE: 1.36 MMOL/L (ref 0.4–2)
LACTATE: 1.43 MMOL/L (ref 0.4–2)
LACTATE: 1.46 MMOL/L (ref 0.4–2)
LACTATE: 1.48 MMOL/L (ref 0.4–2)
LACTATE: 1.58 MMOL/L (ref 0.4–2)
LACTATE: 1.62 MMOL/L (ref 0.4–2)
LACTATE: 1.74 MMOL/L (ref 0.4–2)
LACTATE: 1.78 MMOL/L (ref 0.4–2)
LACTATE: 1.86 MMOL/L (ref 0.4–2)
LACTATE: 10.83 MMOL/L (ref 0.4–2)
LACTATE: 15.27 MMOL/L (ref 0.4–2)
LACTATE: 2.03 MMOL/L (ref 0.4–2)
LACTATE: 2.03 MMOL/L (ref 0.4–2)
LACTATE: 2.04 MMOL/L (ref 0.4–2)
LACTATE: 2.04 MMOL/L (ref 0.4–2)
LACTATE: 2.34 MMOL/L (ref 0.4–2)
LACTATE: 2.86 MMOL/L (ref 0.4–2)
LACTATE: 6.72 MMOL/L (ref 0.4–2)
LACTIC ACID: 1.3 MMOL/L (ref 0.5–2.2)
LACTIC ACID: 1.4 MMOL/L (ref 0.5–2.2)
LEUKOCYTE ESTERASE, URINE: NEGATIVE
LEUKOCYTE ESTERASE, URINE: NEGATIVE
LYMPHOCYTES ABSOLUTE: 0.2 K/UL (ref 1–4.8)
LYMPHOCYTES ABSOLUTE: 0.5 K/UL (ref 1–4.8)
LYMPHOCYTES ABSOLUTE: 0.6 K/UL (ref 1–4.8)
LYMPHOCYTES ABSOLUTE: 0.7 K/UL (ref 1–4.8)
LYMPHOCYTES ABSOLUTE: 0.7 K/UL (ref 1–4.8)
LYMPHOCYTES ABSOLUTE: 1.1 K/UL (ref 1.2–3.7)
LYMPHOCYTES ABSOLUTE: 1.1 K/UL (ref 1.2–3.7)
LYMPHOCYTES ABSOLUTE: 1.2 K/UL (ref 1.2–3.7)
LYMPHOCYTES ABSOLUTE: 1.2 K/UL (ref 1–4.8)
LYMPHOCYTES ABSOLUTE: 1.5 K/UL (ref 1–4.8)
LYMPHOCYTES ABSOLUTE: 1.7 K/UL (ref 1–4.8)
LYMPHOCYTES ABSOLUTE: 2.1 K/UL (ref 1–4.8)
LYMPHOCYTES RELATIVE PERCENT: 1 %
LYMPHOCYTES RELATIVE PERCENT: 11.2 %
LYMPHOCYTES RELATIVE PERCENT: 13.6 %
LYMPHOCYTES RELATIVE PERCENT: 15 %
LYMPHOCYTES RELATIVE PERCENT: 2.2 %
LYMPHOCYTES RELATIVE PERCENT: 20 %
LYMPHOCYTES RELATIVE PERCENT: 20.7 %
LYMPHOCYTES RELATIVE PERCENT: 21.8 %
LYMPHOCYTES RELATIVE PERCENT: 3 %
LYMPHOCYTES RELATIVE PERCENT: 3 %
LYMPHOCYTES RELATIVE PERCENT: 3.6 %
LYMPHOCYTES RELATIVE PERCENT: 3.8 %
LYMPHOCYTES RELATIVE PERCENT: 7 %
LYMPHOCYTES RELATIVE PERCENT: 7.2 %
LYMPHOCYTES RELATIVE PERCENT: 8.3 %
MACROCYTES: ABNORMAL
MAGNESIUM: 1.6 MG/DL (ref 1.7–2.4)
MAGNESIUM: 1.6 MG/DL (ref 1.7–2.4)
MAGNESIUM: 1.8 MG/DL (ref 1.7–2.4)
MAGNESIUM: 2.2 MG/DL (ref 1.7–2.4)
MAGNESIUM: 2.4 MG/DL (ref 1.7–2.4)
MAGNESIUM: 2.4 MG/DL (ref 1.7–2.4)
MAGNESIUM: 2.5 MG/DL (ref 1.7–2.4)
MAGNESIUM: 2.8 MG/DL (ref 1.7–2.4)
MCH RBC QN AUTO: 31.6 PG (ref 25.6–32.2)
MCH RBC QN AUTO: 31.8 PG (ref 25.6–32.2)
MCH RBC QN AUTO: 31.9 PG (ref 27–31.3)
MCH RBC QN AUTO: 32.2 PG (ref 27–31.3)
MCH RBC QN AUTO: 32.2 PG (ref 27–31.3)
MCH RBC QN AUTO: 32.3 PG (ref 27–31.3)
MCH RBC QN AUTO: 32.4 PG (ref 27–31.3)
MCH RBC QN AUTO: 32.5 PG (ref 27–31.3)
MCH RBC QN AUTO: 32.5 PG (ref 27–31.3)
MCH RBC QN AUTO: 32.6 PG (ref 27–31.3)
MCH RBC QN AUTO: 32.8 PG (ref 27–31.3)
MCH RBC QN AUTO: 33.2 PG (ref 25.6–32.2)
MCH RBC QN AUTO: 33.3 PG (ref 27–31.3)
MCH RBC QN AUTO: 33.4 PG (ref 27–31.3)
MCH RBC QN AUTO: 33.6 PG (ref 27–31.3)
MCHC RBC AUTO-ENTMCNC: 30.1 % (ref 33–37)
MCHC RBC AUTO-ENTMCNC: 32 % (ref 33–37)
MCHC RBC AUTO-ENTMCNC: 32.1 % (ref 32.2–35.5)
MCHC RBC AUTO-ENTMCNC: 32.3 % (ref 33–37)
MCHC RBC AUTO-ENTMCNC: 32.6 % (ref 33–37)
MCHC RBC AUTO-ENTMCNC: 32.7 % (ref 32.2–35.5)
MCHC RBC AUTO-ENTMCNC: 32.8 % (ref 33–37)
MCHC RBC AUTO-ENTMCNC: 33.4 % (ref 33–37)
MCHC RBC AUTO-ENTMCNC: 33.6 % (ref 33–37)
MCHC RBC AUTO-ENTMCNC: 33.7 % (ref 33–37)
MCHC RBC AUTO-ENTMCNC: 33.7 % (ref 33–37)
MCHC RBC AUTO-ENTMCNC: 34.3 % (ref 33–37)
MCHC RBC AUTO-ENTMCNC: 34.5 % (ref 33–37)
MCHC RBC AUTO-ENTMCNC: 34.6 % (ref 32.2–35.5)
MCHC RBC AUTO-ENTMCNC: 34.7 % (ref 33–37)
MCV RBC AUTO: 100.9 FL (ref 82–100)
MCV RBC AUTO: 106.8 FL (ref 82–100)
MCV RBC AUTO: 95.7 FL (ref 79.4–94.8)
MCV RBC AUTO: 96.2 FL (ref 82–100)
MCV RBC AUTO: 96.5 FL (ref 82–100)
MCV RBC AUTO: 96.8 FL (ref 79.4–94.8)
MCV RBC AUTO: 97.2 FL (ref 82–100)
MCV RBC AUTO: 97.3 FL (ref 82–100)
MCV RBC AUTO: 98.1 FL (ref 82–100)
MCV RBC AUTO: 98.7 FL (ref 82–100)
MCV RBC AUTO: 98.7 FL (ref 82–100)
MCV RBC AUTO: 99 FL (ref 79.4–94.8)
MCV RBC AUTO: 99.5 FL (ref 82–100)
METAMYELOCYTES RELATIVE PERCENT: 7 %
MICROCYTES: ABNORMAL
MICROCYTES: ABNORMAL
MONOCYTES ABSOLUTE: 0 K/UL (ref 0.2–0.8)
MONOCYTES ABSOLUTE: 0.1 K/UL (ref 0.2–0.8)
MONOCYTES ABSOLUTE: 0.5 K/UL (ref 0.2–0.8)
MONOCYTES ABSOLUTE: 0.5 K/UL (ref 0.2–0.8)
MONOCYTES ABSOLUTE: 0.5 K/UL (ref 0.2–0.9)
MONOCYTES ABSOLUTE: 0.6 K/UL (ref 0.2–0.8)
MONOCYTES ABSOLUTE: 0.6 K/UL (ref 0.2–0.9)
MONOCYTES ABSOLUTE: 0.7 K/UL (ref 0.2–0.8)
MONOCYTES ABSOLUTE: 0.7 K/UL (ref 0.2–0.8)
MONOCYTES ABSOLUTE: 0.8 K/UL (ref 0.2–0.8)
MONOCYTES ABSOLUTE: 0.8 K/UL (ref 0.2–0.9)
MONOCYTES ABSOLUTE: 1 K/UL (ref 0.2–0.8)
MONOCYTES ABSOLUTE: 1.3 K/UL (ref 0.2–0.8)
MONOCYTES ABSOLUTE: 1.4 K/UL (ref 0.2–0.8)
MONOCYTES ABSOLUTE: 1.7 K/UL (ref 0.2–0.8)
MONOCYTES RELATIVE PERCENT: 1.7 %
MONOCYTES RELATIVE PERCENT: 10.2 % (ref 4.7–12.5)
MONOCYTES RELATIVE PERCENT: 13.2 % (ref 4.7–12.5)
MONOCYTES RELATIVE PERCENT: 2.1 %
MONOCYTES RELATIVE PERCENT: 2.9 %
MONOCYTES RELATIVE PERCENT: 3 %
MONOCYTES RELATIVE PERCENT: 3.9 %
MONOCYTES RELATIVE PERCENT: 4.8 %
MONOCYTES RELATIVE PERCENT: 7 % (ref 4.7–12.5)
MONOCYTES RELATIVE PERCENT: 7.1 %
MONOCYTES RELATIVE PERCENT: 8.1 %
MONOCYTES RELATIVE PERCENT: 8.3 %
MONOCYTES RELATIVE PERCENT: 8.6 %
MONOCYTES RELATIVE PERCENT: 9.1 %
MONOCYTES RELATIVE PERCENT: 9.8 %
MYELOCYTE PERCENT: 2 %
NEUTROPHILS ABSOLUTE: 14.2 K/UL (ref 1.4–6.5)
NEUTROPHILS ABSOLUTE: 15.1 K/UL (ref 1.4–6.5)
NEUTROPHILS ABSOLUTE: 16.1 K/UL (ref 1.4–6.5)
NEUTROPHILS ABSOLUTE: 16.5 K/UL (ref 1.4–6.5)
NEUTROPHILS ABSOLUTE: 2.1 K/UL (ref 1.4–6.5)
NEUTROPHILS ABSOLUTE: 25.5 K/UL (ref 1.4–6.5)
NEUTROPHILS ABSOLUTE: 3.4 K/UL (ref 1.6–6.1)
NEUTROPHILS ABSOLUTE: 3.5 K/UL (ref 1.6–6.1)
NEUTROPHILS ABSOLUTE: 4.3 K/UL (ref 1.4–6.5)
NEUTROPHILS ABSOLUTE: 40.1 K/UL (ref 1.4–6.5)
NEUTROPHILS ABSOLUTE: 54.1 K/UL (ref 1.4–6.5)
NEUTROPHILS ABSOLUTE: 6.3 K/UL (ref 1.6–6.1)
NEUTROPHILS ABSOLUTE: 6.6 K/UL (ref 1.4–6.5)
NEUTROPHILS ABSOLUTE: 6.6 K/UL (ref 1.4–6.5)
NEUTROPHILS ABSOLUTE: 7.6 K/UL (ref 1.4–6.5)
NEUTROPHILS RELATIVE PERCENT: 50 %
NEUTROPHILS RELATIVE PERCENT: 60.2 % (ref 34–71.1)
NEUTROPHILS RELATIVE PERCENT: 68.5 % (ref 34–71.1)
NEUTROPHILS RELATIVE PERCENT: 75 %
NEUTROPHILS RELATIVE PERCENT: 75 % (ref 34–71.1)
NEUTROPHILS RELATIVE PERCENT: 76 %
NEUTROPHILS RELATIVE PERCENT: 79.5 %
NEUTROPHILS RELATIVE PERCENT: 82.8 %
NEUTROPHILS RELATIVE PERCENT: 83.7 %
NEUTROPHILS RELATIVE PERCENT: 84 %
NEUTROPHILS RELATIVE PERCENT: 88.3 %
NEUTROPHILS RELATIVE PERCENT: 90 %
NEUTROPHILS RELATIVE PERCENT: 93 %
NEUTROPHILS RELATIVE PERCENT: 95 %
NEUTROPHILS RELATIVE PERCENT: 95.3 %
NITRITE, URINE: NEGATIVE
NITRITE, URINE: NEGATIVE
NUCLEATED RED BLOOD CELLS: 1 /100 WBC
O2 SAT, ARTERIAL: 100 % (ref 93–100)
O2 SAT, ARTERIAL: 100 % (ref 93–100)
O2 SAT, ARTERIAL: 84 % (ref 93–100)
O2 SAT, ARTERIAL: 87 % (ref 93–100)
O2 SAT, ARTERIAL: 87 % (ref 93–100)
O2 SAT, ARTERIAL: 89 % (ref 93–100)
O2 SAT, ARTERIAL: 90 % (ref 93–100)
O2 SAT, ARTERIAL: 91 % (ref 93–100)
O2 SAT, ARTERIAL: 92 % (ref 93–100)
O2 SAT, ARTERIAL: 93 % (ref 93–100)
O2 SAT, ARTERIAL: 94 % (ref 93–100)
O2 SAT, ARTERIAL: 96 % (ref 93–100)
O2 SAT, ARTERIAL: 97 % (ref 93–100)
O2 SAT, ARTERIAL: 98 % (ref 93–100)
O2 SAT, ARTERIAL: 98 % (ref 93–100)
O2 SAT, VEN: 93 %
ORGANISM: ABNORMAL
PCO2 ARTERIAL: 30 MM HG (ref 35–45)
PCO2 ARTERIAL: 33 MM HG (ref 35–45)
PCO2 ARTERIAL: 34 MM HG (ref 35–45)
PCO2 ARTERIAL: 35 MM HG (ref 35–45)
PCO2 ARTERIAL: 35 MM HG (ref 35–45)
PCO2 ARTERIAL: 36 MM HG (ref 35–45)
PCO2 ARTERIAL: 37 MM HG (ref 35–45)
PCO2 ARTERIAL: 37 MM HG (ref 35–45)
PCO2 ARTERIAL: 38 MM HG (ref 35–45)
PCO2 ARTERIAL: 39 MM HG (ref 35–45)
PCO2 ARTERIAL: 40 MM HG (ref 35–45)
PCO2 ARTERIAL: 40 MM HG (ref 35–45)
PCO2 ARTERIAL: 41 MM HG (ref 35–45)
PCO2 ARTERIAL: 42 MM HG (ref 35–45)
PCO2 ARTERIAL: 42 MM HG (ref 35–45)
PCO2 ARTERIAL: 43 MM HG (ref 35–45)
PCO2 ARTERIAL: 45 MM HG (ref 35–45)
PCO2 ARTERIAL: 47 MM HG (ref 35–45)
PCO2 ARTERIAL: 53 MM HG (ref 35–45)
PCO2, VEN: 36.2 MM HG (ref 40–50)
PDW BLD-RTO: 12.7 % (ref 11.7–14.4)
PDW BLD-RTO: 12.8 % (ref 11.7–14.4)
PDW BLD-RTO: 13.4 % (ref 11.5–14.5)
PDW BLD-RTO: 13.5 % (ref 11.5–14.5)
PDW BLD-RTO: 13.5 % (ref 11.5–14.5)
PDW BLD-RTO: 13.6 % (ref 11.5–14.5)
PDW BLD-RTO: 13.7 % (ref 11.5–14.5)
PDW BLD-RTO: 13.8 % (ref 11.5–14.5)
PDW BLD-RTO: 13.9 % (ref 11.5–14.5)
PDW BLD-RTO: 14 % (ref 11.5–14.5)
PDW BLD-RTO: 14.1 % (ref 11.5–14.5)
PDW BLD-RTO: 14.3 % (ref 11.5–14.5)
PDW BLD-RTO: 14.3 % (ref 11.5–14.5)
PDW BLD-RTO: 14.4 % (ref 11.7–14.4)
PDW BLD-RTO: 15.2 % (ref 11.5–14.5)
PERFORMED ON: ABNORMAL
PH ARTERIAL: 6.93 (ref 7.35–7.45)
PH ARTERIAL: 6.93 (ref 7.35–7.45)
PH ARTERIAL: 7.04 (ref 7.35–7.45)
PH ARTERIAL: 7.12 (ref 7.35–7.45)
PH ARTERIAL: 7.26 (ref 7.35–7.45)
PH ARTERIAL: 7.28 (ref 7.35–7.45)
PH ARTERIAL: 7.29 (ref 7.35–7.45)
PH ARTERIAL: 7.3 (ref 7.35–7.45)
PH ARTERIAL: 7.3 (ref 7.35–7.45)
PH ARTERIAL: 7.31 (ref 7.35–7.45)
PH ARTERIAL: 7.32 (ref 7.35–7.45)
PH ARTERIAL: 7.32 (ref 7.35–7.45)
PH ARTERIAL: 7.33 (ref 7.35–7.45)
PH ARTERIAL: 7.33 (ref 7.35–7.45)
PH ARTERIAL: 7.34 (ref 7.35–7.45)
PH ARTERIAL: 7.36 (ref 7.35–7.45)
PH ARTERIAL: 7.36 (ref 7.35–7.45)
PH ARTERIAL: 7.37 (ref 7.35–7.45)
PH ARTERIAL: 7.37 (ref 7.35–7.45)
PH ARTERIAL: 7.46 (ref 7.35–7.45)
PH UA: 6 (ref 5–9)
PH UA: 6.5 (ref 5–9)
PH VENOUS: 7.34 (ref 7.35–7.45)
PHOSPHORUS: 5.1 MG/DL (ref 2.3–4.8)
PLATELET # BLD: 146 K/UL (ref 130–400)
PLATELET # BLD: 148 K/UL (ref 182–369)
PLATELET # BLD: 192 K/UL (ref 130–400)
PLATELET # BLD: 200 K/UL (ref 182–369)
PLATELET # BLD: 202 K/UL (ref 130–400)
PLATELET # BLD: 208 K/UL (ref 182–369)
PLATELET # BLD: 219 K/UL (ref 130–400)
PLATELET # BLD: 225 K/UL (ref 130–400)
PLATELET # BLD: 240 K/UL (ref 130–400)
PLATELET # BLD: 240 K/UL (ref 130–400)
PLATELET # BLD: 256 K/UL (ref 130–400)
PLATELET # BLD: 259 K/UL (ref 130–400)
PLATELET # BLD: 312 K/UL (ref 130–400)
PLATELET # BLD: 331 K/UL (ref 130–400)
PLATELET # BLD: 335 K/UL (ref 130–400)
PLATELET SLIDE REVIEW: ADEQUATE
PLATELET SLIDE REVIEW: ADEQUATE
PLATELET SLIDE REVIEW: NORMAL
PLATELET SLIDE REVIEW: NORMAL
PO2 ARTERIAL: 101 MM HG (ref 75–108)
PO2 ARTERIAL: 102 MM HG (ref 75–108)
PO2 ARTERIAL: 102 MM HG (ref 75–108)
PO2 ARTERIAL: 107 MM HG (ref 75–108)
PO2 ARTERIAL: 109 MM HG (ref 75–108)
PO2 ARTERIAL: 116 MM HG (ref 75–108)
PO2 ARTERIAL: 243 MM HG (ref 75–108)
PO2 ARTERIAL: 359 MM HG (ref 75–108)
PO2 ARTERIAL: 58 MM HG (ref 75–108)
PO2 ARTERIAL: 59 MM HG (ref 75–108)
PO2 ARTERIAL: 59 MM HG (ref 75–108)
PO2 ARTERIAL: 64 MM HG (ref 75–108)
PO2 ARTERIAL: 65 MM HG (ref 75–108)
PO2 ARTERIAL: 66 MM HG (ref 75–108)
PO2 ARTERIAL: 67 MM HG (ref 75–108)
PO2 ARTERIAL: 69 MM HG (ref 75–108)
PO2 ARTERIAL: 71 MM HG (ref 75–108)
PO2 ARTERIAL: 72 MM HG (ref 75–108)
PO2 ARTERIAL: 80 MM HG (ref 75–108)
PO2 ARTERIAL: 85 MM HG (ref 75–108)
PO2 ARTERIAL: 92 MM HG (ref 75–108)
PO2 ARTERIAL: 95 MM HG (ref 75–108)
PO2 ARTERIAL: 96 MM HG (ref 75–108)
PO2 ARTERIAL: 97 MM HG (ref 75–108)
PO2, VEN: 70 MM HG
POC CHLORIDE: 103 MEQ/L (ref 99–110)
POC CHLORIDE: 104 MEQ/L (ref 99–110)
POC CHLORIDE: 105 MEQ/L (ref 99–110)
POC CHLORIDE: 106 MEQ/L (ref 99–110)
POC CHLORIDE: 108 MEQ/L (ref 99–110)
POC CHLORIDE: 108 MEQ/L (ref 99–110)
POC CHLORIDE: 99 MEQ/L (ref 99–110)
POC CREATININE: 1 MG/DL (ref 0.6–1.2)
POC CREATININE: 1.1 MG/DL (ref 0.6–1.2)
POC CREATININE: 1.4 MG/DL (ref 0.6–1.2)
POC CREATININE: 1.7 MG/DL (ref 0.6–1.2)
POC CREATININE: 1.8 MG/DL (ref 0.6–1.2)
POC CREATININE: 2.1 MG/DL (ref 0.6–1.2)
POC CREATININE: 2.2 MG/DL (ref 0.6–1.2)
POC CREATININE: 2.3 MG/DL (ref 0.6–1.2)
POC CREATININE: 2.4 MG/DL (ref 0.6–1.2)
POC CREATININE: 2.5 MG/DL (ref 0.6–1.2)
POC CREATININE: 2.7 MG/DL (ref 0.6–1.2)
POC CREATININE: 3 MG/DL (ref 0.6–1.2)
POC CREATININE: 3.5 MG/DL (ref 0.6–1.2)
POC CREATININE: 4.2 MG/DL (ref 0.6–1.2)
POC FIO2: 100
POC FIO2: 40
POC FIO2: 50
POC FIO2: 60
POC FIO2: 70
POC FIO2: 90
POC HEMATOCRIT: 30 % (ref 36–48)
POC HEMATOCRIT: 32 % (ref 36–48)
POC HEMATOCRIT: 33 % (ref 36–48)
POC HEMATOCRIT: 35 % (ref 36–48)
POC HEMATOCRIT: 36 % (ref 36–48)
POC HEMATOCRIT: 37 % (ref 36–48)
POC HEMATOCRIT: 38 % (ref 36–48)
POC HEMATOCRIT: 38 % (ref 36–48)
POC HEMATOCRIT: 39 % (ref 36–48)
POC HEMATOCRIT: 39 % (ref 36–48)
POC HEMATOCRIT: 40 % (ref 36–48)
POC HEMATOCRIT: 40 % (ref 36–48)
POC HEMATOCRIT: 41 % (ref 36–48)
POC HEMATOCRIT: 43 % (ref 36–48)
POC POTASSIUM: 2.7 MEQ/L (ref 3.5–5.1)
POC POTASSIUM: 3 MEQ/L (ref 3.5–5.1)
POC POTASSIUM: 3.1 MEQ/L (ref 3.5–5.1)
POC POTASSIUM: 3.1 MEQ/L (ref 3.5–5.1)
POC POTASSIUM: 3.2 MEQ/L (ref 3.5–5.1)
POC POTASSIUM: 3.3 MEQ/L (ref 3.5–5.1)
POC POTASSIUM: 3.3 MEQ/L (ref 3.5–5.1)
POC POTASSIUM: 3.5 MEQ/L (ref 3.5–5.1)
POC POTASSIUM: 3.7 MEQ/L (ref 3.5–5.1)
POC POTASSIUM: 3.8 MEQ/L (ref 3.5–5.1)
POC POTASSIUM: 3.9 MEQ/L (ref 3.5–5.1)
POC POTASSIUM: 4.2 MEQ/L (ref 3.5–5.1)
POC POTASSIUM: 4.4 MEQ/L (ref 3.5–5.1)
POC POTASSIUM: 5.3 MEQ/L (ref 3.5–5.1)
POC POTASSIUM: 5.6 MEQ/L (ref 3.5–5.1)
POC POTASSIUM: 6.1 MEQ/L (ref 3.5–5.1)
POC POTASSIUM: 7.2 MEQ/L (ref 3.5–5.1)
POC SAMPLE TYPE: ABNORMAL
POC SODIUM: 125 MEQ/L (ref 136–145)
POC SODIUM: 127 MEQ/L (ref 136–145)
POC SODIUM: 132 MEQ/L (ref 136–145)
POC SODIUM: 134 MEQ/L (ref 136–145)
POC SODIUM: 134 MEQ/L (ref 136–145)
POC SODIUM: 135 MEQ/L (ref 136–145)
POC SODIUM: 136 MEQ/L (ref 136–145)
POC SODIUM: 137 MEQ/L (ref 136–145)
POC SODIUM: 138 MEQ/L (ref 136–145)
POTASSIUM REFLEX MAGNESIUM: 3.1 MEQ/L (ref 3.4–4.9)
POTASSIUM REFLEX MAGNESIUM: 3.3 MEQ/L (ref 3.4–4.9)
POTASSIUM REFLEX MAGNESIUM: 3.5 MEQ/L (ref 3.4–4.9)
POTASSIUM REFLEX MAGNESIUM: 3.5 MEQ/L (ref 3.4–4.9)
POTASSIUM REFLEX MAGNESIUM: 3.6 MEQ/L (ref 3.4–4.9)
POTASSIUM REFLEX MAGNESIUM: 3.6 MEQ/L (ref 3.4–4.9)
POTASSIUM REFLEX MAGNESIUM: 4 MEQ/L (ref 3.4–4.9)
POTASSIUM REFLEX MAGNESIUM: 4.1 MEQ/L (ref 3.4–4.9)
POTASSIUM REFLEX MAGNESIUM: 4.5 MEQ/L (ref 3.4–4.9)
POTASSIUM REFLEX MAGNESIUM: 5 MEQ/L (ref 3.4–4.9)
POTASSIUM REFLEX MAGNESIUM: 7.9 MEQ/L (ref 3.4–4.9)
POTASSIUM SERPL-SCNC: 3.4 MEQ/L (ref 3.4–4.9)
POTASSIUM SERPL-SCNC: 3.6 MEQ/L (ref 3.4–4.9)
POTASSIUM SERPL-SCNC: 4.2 MEQ/L (ref 3.4–4.9)
POTASSIUM SERPL-SCNC: 4.2 MEQ/L (ref 3.4–4.9)
POTASSIUM SERPL-SCNC: 4.4 MEQ/L (ref 3.4–4.9)
POTASSIUM SERPL-SCNC: 5.2 MEQ/L (ref 3.4–4.9)
PRO-BNP: 681 PG/ML
PROCALCITONIN: 0.14 NG/ML (ref 0–0.15)
PROCALCITONIN: 0.18 NG/ML (ref 0–0.15)
PROCALCITONIN: 0.42 NG/ML (ref 0–0.15)
PROCALCITONIN: 0.51 NG/ML (ref 0–0.15)
PROTEIN UA: >=300 MG/DL
PROTEIN UA: NORMAL MG/DL
PROTHROMBIN TIME: 12.8 SEC (ref 12.3–14.9)
PROTHROMBIN TIME: 13.7 SEC (ref 12.3–14.9)
QUANTI TB GOLD PLUS: NEGATIVE
QUANTI TB1 MINUS NIL: 0 IU/ML (ref 0–0.34)
QUANTI TB2 MINUS NIL: 0 IU/ML (ref 0–0.34)
QUANTIFERON MITOGEN: 1.32 IU/ML
QUANTIFERON NIL: 0.02 IU/ML
RBC # BLD: 3.45 M/UL (ref 4.2–5.4)
RBC # BLD: 3.51 M/UL (ref 4.2–5.4)
RBC # BLD: 3.57 M/UL (ref 4.2–5.4)
RBC # BLD: 3.59 M/UL (ref 4.2–5.4)
RBC # BLD: 3.63 M/UL (ref 4.2–5.4)
RBC # BLD: 3.87 M/UL (ref 3.93–5.22)
RBC # BLD: 3.89 M/UL (ref 4.2–5.4)
RBC # BLD: 3.98 M/UL (ref 3.93–5.22)
RBC # BLD: 4.02 M/UL (ref 4.2–5.4)
RBC # BLD: 4.12 M/UL (ref 4.2–5.4)
RBC # BLD: 4.17 M/UL (ref 4.2–5.4)
RBC # BLD: 4.18 M/UL (ref 4.2–5.4)
RBC # BLD: 4.22 M/UL (ref 4.2–5.4)
RBC # BLD: 4.3 M/UL (ref 4.2–5.4)
RBC # BLD: 4.33 M/UL (ref 3.93–5.22)
RBC UA: ABNORMAL /HPF (ref 0–2)
RBC UA: NORMAL /HPF (ref 0–2)
SARS-COV-2, NAAT: DETECTED
SODIUM BLD-SCNC: 123 MEQ/L (ref 135–144)
SODIUM BLD-SCNC: 127 MEQ/L (ref 135–144)
SODIUM BLD-SCNC: 129 MEQ/L (ref 135–144)
SODIUM BLD-SCNC: 132 MEQ/L (ref 135–144)
SODIUM BLD-SCNC: 133 MEQ/L (ref 135–144)
SODIUM BLD-SCNC: 133 MEQ/L (ref 135–144)
SODIUM BLD-SCNC: 134 MEQ/L (ref 135–144)
SODIUM BLD-SCNC: 135 MEQ/L (ref 135–144)
SODIUM BLD-SCNC: 135 MEQ/L (ref 135–144)
SODIUM BLD-SCNC: 136 MEQ/L (ref 135–144)
SODIUM BLD-SCNC: 137 MEQ/L (ref 135–144)
SODIUM BLD-SCNC: 138 MEQ/L (ref 135–144)
SODIUM BLD-SCNC: 140 MEQ/L (ref 135–144)
SODIUM BLD-SCNC: 142 MEQ/L (ref 135–144)
SPECIFIC GRAVITY UA: 1.02 (ref 1–1.03)
SPECIFIC GRAVITY UA: >=1.03 (ref 1–1.03)
TCO2 ARTERIAL: 10 (ref 22–29)
TCO2 ARTERIAL: 10 (ref 22–29)
TCO2 ARTERIAL: 14 (ref 22–29)
TCO2 ARTERIAL: 19 (ref 22–29)
TCO2 ARTERIAL: 20 (ref 22–29)
TCO2 ARTERIAL: 21
TCO2 ARTERIAL: 21 (ref 22–29)
TCO2 ARTERIAL: 22 (ref 22–29)
TCO2 ARTERIAL: 23 (ref 22–29)
TCO2 ARTERIAL: 23 (ref 22–29)
TCO2 ARTERIAL: 24 (ref 22–29)
TCO2 CALC VENOUS: 21 MMOL/L
TOTAL PROTEIN: 4.3 G/DL (ref 6.3–8)
TOTAL PROTEIN: 4.8 G/DL (ref 6.3–8)
TOTAL PROTEIN: 4.8 G/DL (ref 6.3–8)
TOTAL PROTEIN: 4.9 G/DL (ref 6.3–8)
TOTAL PROTEIN: 4.9 G/DL (ref 6.3–8)
TOTAL PROTEIN: 5.4 G/DL (ref 6.3–8)
TOTAL PROTEIN: 5.5 G/DL (ref 6.3–8)
TOTAL PROTEIN: 5.5 G/DL (ref 6.3–8)
TOTAL PROTEIN: 5.6 G/DL (ref 6.3–8)
TOTAL PROTEIN: 5.8 G/DL (ref 6.3–8)
TOTAL PROTEIN: 5.9 G/DL (ref 6.3–8)
TOTAL PROTEIN: 6 G/DL (ref 6.3–8)
TOTAL PROTEIN: 6.5 G/DL (ref 6.3–8)
TOTAL PROTEIN: 6.9 G/DL (ref 6.3–8)
TOTAL PROTEIN: 7.6 G/DL (ref 6.3–8)
TROPONIN: <0.01 NG/ML (ref 0–0.01)
URINE CULTURE, ROUTINE: ABNORMAL
URINE REFLEX TO CULTURE: NORMAL
URINE REFLEX TO CULTURE: YES
UROBILINOGEN, URINE: 0.2 E.U./DL
UROBILINOGEN, URINE: 0.2 E.U./DL
VANCOMYCIN RANDOM: 12.9 UG/ML (ref 10–40)
WBC # BLD: 10.2 K/UL (ref 4.8–10.8)
WBC # BLD: 16.4 K/UL (ref 4.8–10.8)
WBC # BLD: 16.9 K/UL (ref 4.8–10.8)
WBC # BLD: 17 K/UL (ref 4.8–10.8)
WBC # BLD: 18.7 K/UL (ref 4.8–10.8)
WBC # BLD: 2.8 K/UL (ref 4.8–10.8)
WBC # BLD: 26.8 K/UL (ref 4.8–10.8)
WBC # BLD: 41.3 K/UL (ref 4.8–10.8)
WBC # BLD: 5.1 K/UL (ref 4–10)
WBC # BLD: 5.4 K/UL (ref 4.8–10.8)
WBC # BLD: 5.7 K/UL (ref 4–10)
WBC # BLD: 58.1 K/UL (ref 4.8–10.8)
WBC # BLD: 7.9 K/UL (ref 4.8–10.8)
WBC # BLD: 8 K/UL (ref 4.8–10.8)
WBC # BLD: 8.4 K/UL (ref 4–10)
WBC UA: ABNORMAL /HPF (ref 0–5)
WBC UA: NORMAL /HPF (ref 0–5)

## 2021-01-01 PROCEDURE — 6360000002 HC RX W HCPCS: Performed by: INTERNAL MEDICINE

## 2021-01-01 PROCEDURE — 2580000003 HC RX 258: Performed by: INTERNAL MEDICINE

## 2021-01-01 PROCEDURE — 6360000002 HC RX W HCPCS: Performed by: EMERGENCY MEDICINE

## 2021-01-01 PROCEDURE — 2500000003 HC RX 250 WO HCPCS: Performed by: INTERNAL MEDICINE

## 2021-01-01 PROCEDURE — 2700000000 HC OXYGEN THERAPY PER DAY

## 2021-01-01 PROCEDURE — 1090F PRES/ABSN URINE INCON ASSESS: CPT | Performed by: NURSE PRACTITIONER

## 2021-01-01 PROCEDURE — 2580000003 HC RX 258: Performed by: NURSE PRACTITIONER

## 2021-01-01 PROCEDURE — 6360000002 HC RX W HCPCS

## 2021-01-01 PROCEDURE — 80053 COMPREHEN METABOLIC PANEL: CPT

## 2021-01-01 PROCEDURE — 82565 ASSAY OF CREATININE: CPT

## 2021-01-01 PROCEDURE — 6370000000 HC RX 637 (ALT 250 FOR IP): Performed by: INTERNAL MEDICINE

## 2021-01-01 PROCEDURE — 99291 CRITICAL CARE FIRST HOUR: CPT | Performed by: INTERNAL MEDICINE

## 2021-01-01 PROCEDURE — 2000000000 HC ICU R&B

## 2021-01-01 PROCEDURE — 84484 ASSAY OF TROPONIN QUANT: CPT

## 2021-01-01 PROCEDURE — 99284 EMERGENCY DEPT VISIT MOD MDM: CPT

## 2021-01-01 PROCEDURE — 94640 AIRWAY INHALATION TREATMENT: CPT

## 2021-01-01 PROCEDURE — 99232 SBSQ HOSP IP/OBS MODERATE 35: CPT | Performed by: INTERNAL MEDICINE

## 2021-01-01 PROCEDURE — 85025 COMPLETE CBC W/AUTO DIFF WBC: CPT

## 2021-01-01 PROCEDURE — 82435 ASSAY OF BLOOD CHLORIDE: CPT

## 2021-01-01 PROCEDURE — 99233 SBSQ HOSP IP/OBS HIGH 50: CPT | Performed by: INTERNAL MEDICINE

## 2021-01-01 PROCEDURE — 84295 ASSAY OF SERUM SODIUM: CPT

## 2021-01-01 PROCEDURE — 83605 ASSAY OF LACTIC ACID: CPT

## 2021-01-01 PROCEDURE — 99222 1ST HOSP IP/OBS MODERATE 55: CPT | Performed by: INTERNAL MEDICINE

## 2021-01-01 PROCEDURE — 82803 BLOOD GASES ANY COMBINATION: CPT

## 2021-01-01 PROCEDURE — 36620 INSERTION CATHETER ARTERY: CPT

## 2021-01-01 PROCEDURE — 83735 ASSAY OF MAGNESIUM: CPT

## 2021-01-01 PROCEDURE — 94660 CPAP INITIATION&MGMT: CPT

## 2021-01-01 PROCEDURE — 93010 ELECTROCARDIOGRAM REPORT: CPT | Performed by: INTERNAL MEDICINE

## 2021-01-01 PROCEDURE — 2500000003 HC RX 250 WO HCPCS

## 2021-01-01 PROCEDURE — 71045 X-RAY EXAM CHEST 1 VIEW: CPT

## 2021-01-01 PROCEDURE — 94003 VENT MGMT INPAT SUBQ DAY: CPT

## 2021-01-01 PROCEDURE — 6360000002 HC RX W HCPCS: Performed by: NURSE PRACTITIONER

## 2021-01-01 PROCEDURE — 85014 HEMATOCRIT: CPT

## 2021-01-01 PROCEDURE — G8419 CALC BMI OUT NRM PARAM NOF/U: HCPCS | Performed by: NURSE PRACTITIONER

## 2021-01-01 PROCEDURE — 6370000000 HC RX 637 (ALT 250 FOR IP): Performed by: NURSE PRACTITIONER

## 2021-01-01 PROCEDURE — 93971 EXTREMITY STUDY: CPT

## 2021-01-01 PROCEDURE — 36415 COLL VENOUS BLD VENIPUNCTURE: CPT

## 2021-01-01 PROCEDURE — 99202 OFFICE O/P NEW SF 15 MIN: CPT | Performed by: NURSE PRACTITIONER

## 2021-01-01 PROCEDURE — 37799 UNLISTED PX VASCULAR SURGERY: CPT

## 2021-01-01 PROCEDURE — 87086 URINE CULTURE/COLONY COUNT: CPT

## 2021-01-01 PROCEDURE — 96365 THER/PROPH/DIAG IV INF INIT: CPT

## 2021-01-01 PROCEDURE — 71275 CT ANGIOGRAPHY CHEST: CPT

## 2021-01-01 PROCEDURE — 31500 INSERT EMERGENCY AIRWAY: CPT | Performed by: INTERNAL MEDICINE

## 2021-01-01 PROCEDURE — 84145 PROCALCITONIN (PCT): CPT

## 2021-01-01 PROCEDURE — 93005 ELECTROCARDIOGRAM TRACING: CPT

## 2021-01-01 PROCEDURE — 36600 WITHDRAWAL OF ARTERIAL BLOOD: CPT

## 2021-01-01 PROCEDURE — 82330 ASSAY OF CALCIUM: CPT

## 2021-01-01 PROCEDURE — 87040 BLOOD CULTURE FOR BACTERIA: CPT

## 2021-01-01 PROCEDURE — 85379 FIBRIN DEGRADATION QUANT: CPT

## 2021-01-01 PROCEDURE — 6360000004 HC RX CONTRAST MEDICATION: Performed by: EMERGENCY MEDICINE

## 2021-01-01 PROCEDURE — 36620 INSERTION CATHETER ARTERY: CPT | Performed by: INTERNAL MEDICINE

## 2021-01-01 PROCEDURE — 6370000000 HC RX 637 (ALT 250 FOR IP): Performed by: EMERGENCY MEDICINE

## 2021-01-01 PROCEDURE — 84132 ASSAY OF SERUM POTASSIUM: CPT

## 2021-01-01 PROCEDURE — 05HM33Z INSERTION OF INFUSION DEVICE INTO RIGHT INTERNAL JUGULAR VEIN, PERCUTANEOUS APPROACH: ICD-10-PCS | Performed by: INTERNAL MEDICINE

## 2021-01-01 PROCEDURE — 96375 TX/PRO/DX INJ NEW DRUG ADDON: CPT

## 2021-01-01 PROCEDURE — C9113 INJ PANTOPRAZOLE SODIUM, VIA: HCPCS | Performed by: NURSE PRACTITIONER

## 2021-01-01 PROCEDURE — 94760 N-INVAS EAR/PLS OXIMETRY 1: CPT

## 2021-01-01 PROCEDURE — 94761 N-INVAS EAR/PLS OXIMETRY MLT: CPT

## 2021-01-01 PROCEDURE — 36556 INSERT NON-TUNNEL CV CATH: CPT | Performed by: INTERNAL MEDICINE

## 2021-01-01 PROCEDURE — 99233 SBSQ HOSP IP/OBS HIGH 50: CPT | Performed by: NURSE PRACTITIONER

## 2021-01-01 PROCEDURE — 0BH17EZ INSERTION OF ENDOTRACHEAL AIRWAY INTO TRACHEA, VIA NATURAL OR ARTIFICIAL OPENING: ICD-10-PCS | Performed by: INTERNAL MEDICINE

## 2021-01-01 PROCEDURE — 81001 URINALYSIS AUTO W/SCOPE: CPT

## 2021-01-01 PROCEDURE — 80202 ASSAY OF VANCOMYCIN: CPT

## 2021-01-01 PROCEDURE — 87635 SARS-COV-2 COVID-19 AMP PRB: CPT

## 2021-01-01 PROCEDURE — XW0DXM6 INTRODUCTION OF BARICITINIB INTO MOUTH AND PHARYNX, EXTERNAL APPROACH, NEW TECHNOLOGY GROUP 6: ICD-10-PCS | Performed by: INTERNAL MEDICINE

## 2021-01-01 PROCEDURE — G8399 PT W/DXA RESULTS DOCUMENT: HCPCS | Performed by: NURSE PRACTITIONER

## 2021-01-01 PROCEDURE — XW033E5 INTRODUCTION OF REMDESIVIR ANTI-INFECTIVE INTO PERIPHERAL VEIN, PERCUTANEOUS APPROACH, NEW TECHNOLOGY GROUP 5: ICD-10-PCS | Performed by: INTERNAL MEDICINE

## 2021-01-01 PROCEDURE — 83880 ASSAY OF NATRIURETIC PEPTIDE: CPT

## 2021-01-01 PROCEDURE — 74176 CT ABD & PELVIS W/O CONTRAST: CPT

## 2021-01-01 PROCEDURE — 93970 EXTREMITY STUDY: CPT

## 2021-01-01 PROCEDURE — 99285 EMERGENCY DEPT VISIT HI MDM: CPT

## 2021-01-01 PROCEDURE — 85610 PROTHROMBIN TIME: CPT

## 2021-01-01 PROCEDURE — 86140 C-REACTIVE PROTEIN: CPT

## 2021-01-01 PROCEDURE — 96374 THER/PROPH/DIAG INJ IV PUSH: CPT

## 2021-01-01 PROCEDURE — 87449 NOS EACH ORGANISM AG IA: CPT

## 2021-01-01 PROCEDURE — 2580000003 HC RX 258: Performed by: EMERGENCY MEDICINE

## 2021-01-01 PROCEDURE — 83615 LACTATE (LD) (LDH) ENZYME: CPT

## 2021-01-01 PROCEDURE — 87186 SC STD MICRODIL/AGAR DIL: CPT

## 2021-01-01 PROCEDURE — 87149 DNA/RNA DIRECT PROBE: CPT

## 2021-01-01 PROCEDURE — 2500000003 HC RX 250 WO HCPCS: Performed by: NURSE PRACTITIONER

## 2021-01-01 PROCEDURE — P9047 ALBUMIN (HUMAN), 25%, 50ML: HCPCS | Performed by: INTERNAL MEDICINE

## 2021-01-01 PROCEDURE — 87077 CULTURE AEROBIC IDENTIFY: CPT

## 2021-01-01 PROCEDURE — 76937 US GUIDE VASCULAR ACCESS: CPT | Performed by: INTERNAL MEDICINE

## 2021-01-01 PROCEDURE — 94002 VENT MGMT INPAT INIT DAY: CPT

## 2021-01-01 PROCEDURE — 1036F TOBACCO NON-USER: CPT | Performed by: NURSE PRACTITIONER

## 2021-01-01 PROCEDURE — 2580000003 HC RX 258

## 2021-01-01 PROCEDURE — XW033H5 INTRODUCTION OF TOCILIZUMAB INTO PERIPHERAL VEIN, PERCUTANEOUS APPROACH, NEW TECHNOLOGY GROUP 5: ICD-10-PCS | Performed by: INTERNAL MEDICINE

## 2021-01-01 PROCEDURE — 86480 TB TEST CELL IMMUN MEASURE: CPT

## 2021-01-01 PROCEDURE — 31500 INSERT EMERGENCY AIRWAY: CPT

## 2021-01-01 PROCEDURE — 99221 1ST HOSP IP/OBS SF/LOW 40: CPT | Performed by: NURSE PRACTITIONER

## 2021-01-01 PROCEDURE — 1123F ACP DISCUSS/DSCN MKR DOCD: CPT | Performed by: NURSE PRACTITIONER

## 2021-01-01 PROCEDURE — 4040F PNEUMOC VAC/ADMIN/RCVD: CPT | Performed by: NURSE PRACTITIONER

## 2021-01-01 PROCEDURE — 94799 UNLISTED PULMONARY SVC/PX: CPT

## 2021-01-01 PROCEDURE — 5A1955Z RESPIRATORY VENTILATION, GREATER THAN 96 CONSECUTIVE HOURS: ICD-10-PCS | Performed by: INTERNAL MEDICINE

## 2021-01-01 PROCEDURE — G8427 DOCREV CUR MEDS BY ELIG CLIN: HCPCS | Performed by: NURSE PRACTITIONER

## 2021-01-01 PROCEDURE — 85384 FIBRINOGEN ACTIVITY: CPT

## 2021-01-01 RX ORDER — LACTULOSE 10 G/15ML
20 SOLUTION ORAL EVERY 6 HOURS SCHEDULED
Status: DISCONTINUED | OUTPATIENT
Start: 2021-01-01 | End: 2021-01-01 | Stop reason: HOSPADM

## 2021-01-01 RX ORDER — CLONAZEPAM 0.5 MG/1
0.25 TABLET ORAL 2 TIMES DAILY
Status: DISCONTINUED | OUTPATIENT
Start: 2021-01-01 | End: 2021-01-01 | Stop reason: HOSPADM

## 2021-01-01 RX ORDER — SUCCINYLCHOLINE/SOD CL,ISO/PF 100 MG/5ML
SYRINGE (ML) INTRAVENOUS
Status: COMPLETED
Start: 2021-01-01 | End: 2021-01-01

## 2021-01-01 RX ORDER — ETOMIDATE 2 MG/ML
INJECTION INTRAVENOUS
Status: COMPLETED
Start: 2021-01-01 | End: 2021-01-01

## 2021-01-01 RX ORDER — MULTIVITAMIN/IRON/FOLIC ACID 18MG-0.4MG
1 TABLET ORAL DAILY
COMMUNITY

## 2021-01-01 RX ORDER — FUROSEMIDE 40 MG/1
40 TABLET ORAL DAILY
Status: DISCONTINUED | OUTPATIENT
Start: 2021-01-01 | End: 2021-01-01

## 2021-01-01 RX ORDER — SODIUM CHLORIDE 9 MG/ML
INJECTION, SOLUTION INTRAVENOUS CONTINUOUS
Status: DISCONTINUED | OUTPATIENT
Start: 2021-01-01 | End: 2021-01-01 | Stop reason: HOSPADM

## 2021-01-01 RX ORDER — CARVEDILOL 6.25 MG/1
6.25 TABLET ORAL 2 TIMES DAILY WITH MEALS
Status: DISCONTINUED | OUTPATIENT
Start: 2021-01-01 | End: 2021-01-01

## 2021-01-01 RX ORDER — SODIUM CHLORIDE 0.9 % (FLUSH) 0.9 %
3 SYRINGE (ML) INJECTION EVERY 8 HOURS
Status: DISCONTINUED | OUTPATIENT
Start: 2021-01-01 | End: 2021-01-01 | Stop reason: HOSPADM

## 2021-01-01 RX ORDER — FEBUXOSTAT 40 MG/1
40 TABLET, FILM COATED ORAL DAILY
Status: DISCONTINUED | OUTPATIENT
Start: 2021-01-01 | End: 2021-01-01

## 2021-01-01 RX ORDER — CITALOPRAM 20 MG/1
20 TABLET ORAL DAILY
Status: DISCONTINUED | OUTPATIENT
Start: 2021-01-01 | End: 2021-01-01 | Stop reason: HOSPADM

## 2021-01-01 RX ORDER — METOPROLOL TARTRATE 5 MG/5ML
5 INJECTION INTRAVENOUS EVERY 6 HOURS PRN
Status: DISCONTINUED | OUTPATIENT
Start: 2021-01-01 | End: 2021-01-01

## 2021-01-01 RX ORDER — ALBUTEROL SULFATE 2.5 MG/.5ML
2.5 SOLUTION RESPIRATORY (INHALATION) EVERY 6 HOURS PRN
Qty: 120 ML | Refills: 0 | Status: SHIPPED | OUTPATIENT
Start: 2021-01-01

## 2021-01-01 RX ORDER — FUROSEMIDE 10 MG/ML
20 INJECTION INTRAMUSCULAR; INTRAVENOUS ONCE
Status: COMPLETED | OUTPATIENT
Start: 2021-01-01 | End: 2021-01-01

## 2021-01-01 RX ORDER — 0.9 % SODIUM CHLORIDE 0.9 %
500 INTRAVENOUS SOLUTION INTRAVENOUS ONCE
Status: COMPLETED | OUTPATIENT
Start: 2021-01-01 | End: 2021-01-01

## 2021-01-01 RX ORDER — POTASSIUM CHLORIDE 20 MEQ/1
60 TABLET, EXTENDED RELEASE ORAL ONCE
Status: DISCONTINUED | OUTPATIENT
Start: 2021-01-01 | End: 2021-01-01 | Stop reason: HOSPADM

## 2021-01-01 RX ORDER — LOSARTAN POTASSIUM 100 MG/1
TABLET ORAL
COMMUNITY
Start: 2021-01-01

## 2021-01-01 RX ORDER — FLUCONAZOLE 2 MG/ML
100 INJECTION, SOLUTION INTRAVENOUS EVERY 24 HOURS
Status: COMPLETED | OUTPATIENT
Start: 2021-01-01 | End: 2021-01-01

## 2021-01-01 RX ORDER — HEPARIN SODIUM 5000 [USP'U]/ML
5000 INJECTION, SOLUTION INTRAVENOUS; SUBCUTANEOUS EVERY 8 HOURS SCHEDULED
Status: DISCONTINUED | OUTPATIENT
Start: 2021-01-01 | End: 2021-01-01 | Stop reason: HOSPADM

## 2021-01-01 RX ORDER — ETOMIDATE 2 MG/ML
0.3 INJECTION INTRAVENOUS ONCE
Status: COMPLETED | OUTPATIENT
Start: 2021-01-01 | End: 2021-01-01

## 2021-01-01 RX ORDER — METHYLPREDNISOLONE SODIUM SUCCINATE 125 MG/2ML
125 INJECTION, POWDER, LYOPHILIZED, FOR SOLUTION INTRAMUSCULAR; INTRAVENOUS ONCE
Status: COMPLETED | OUTPATIENT
Start: 2021-01-01 | End: 2021-01-01

## 2021-01-01 RX ORDER — 0.9 % SODIUM CHLORIDE 0.9 %
500 INTRAVENOUS SOLUTION INTRAVENOUS ONCE
Status: DISCONTINUED | OUTPATIENT
Start: 2021-01-01 | End: 2021-01-01

## 2021-01-01 RX ORDER — DEXAMETHASONE SODIUM PHOSPHATE 10 MG/ML
10 INJECTION, SOLUTION INTRAMUSCULAR; INTRAVENOUS ONCE
Status: COMPLETED | OUTPATIENT
Start: 2021-01-01 | End: 2021-01-01

## 2021-01-01 RX ORDER — 0.9 % SODIUM CHLORIDE 0.9 %
250 INTRAVENOUS SOLUTION INTRAVENOUS ONCE
Status: COMPLETED | OUTPATIENT
Start: 2021-01-01 | End: 2021-01-01

## 2021-01-01 RX ORDER — SODIUM CHLORIDE, SODIUM LACTATE, POTASSIUM CHLORIDE, CALCIUM CHLORIDE 600; 310; 30; 20 MG/100ML; MG/100ML; MG/100ML; MG/100ML
INJECTION, SOLUTION INTRAVENOUS
Status: COMPLETED
Start: 2021-01-01 | End: 2021-01-01

## 2021-01-01 RX ORDER — SENNA AND DOCUSATE SODIUM 50; 8.6 MG/1; MG/1
2 TABLET, FILM COATED ORAL DAILY PRN
Status: DISCONTINUED | OUTPATIENT
Start: 2021-01-01 | End: 2021-01-01 | Stop reason: HOSPADM

## 2021-01-01 RX ORDER — MAGNESIUM SULFATE IN WATER 40 MG/ML
2000 INJECTION, SOLUTION INTRAVENOUS ONCE
Status: COMPLETED | OUTPATIENT
Start: 2021-01-01 | End: 2021-01-01

## 2021-01-01 RX ORDER — ALBUTEROL SULFATE 90 UG/1
2 AEROSOL, METERED RESPIRATORY (INHALATION) ONCE
Status: COMPLETED | OUTPATIENT
Start: 2021-01-01 | End: 2021-01-01

## 2021-01-01 RX ORDER — CLINDAMYCIN PHOSPHATE 600 MG/50ML
600 INJECTION INTRAVENOUS EVERY 8 HOURS
Status: DISCONTINUED | OUTPATIENT
Start: 2021-01-01 | End: 2021-01-01 | Stop reason: HOSPADM

## 2021-01-01 RX ORDER — VALSARTAN 160 MG/1
320 TABLET ORAL DAILY
Status: DISCONTINUED | OUTPATIENT
Start: 2021-01-01 | End: 2021-01-01 | Stop reason: HOSPADM

## 2021-01-01 RX ORDER — LORAZEPAM 2 MG/ML
1 INJECTION INTRAMUSCULAR ONCE
Status: COMPLETED | OUTPATIENT
Start: 2021-01-01 | End: 2021-01-01

## 2021-01-01 RX ORDER — SUCCINYLCHOLINE/SOD CL,ISO/PF 100 MG/5ML
1.5 SYRINGE (ML) INTRAVENOUS ONCE
Status: COMPLETED | OUTPATIENT
Start: 2021-01-01 | End: 2021-01-01

## 2021-01-01 RX ORDER — FLUCONAZOLE 2 MG/ML
100 INJECTION, SOLUTION INTRAVENOUS EVERY 24 HOURS
Status: DISCONTINUED | OUTPATIENT
Start: 2021-01-01 | End: 2021-01-01 | Stop reason: HOSPADM

## 2021-01-01 RX ORDER — HYDRALAZINE HYDROCHLORIDE 20 MG/ML
20 INJECTION INTRAMUSCULAR; INTRAVENOUS EVERY 4 HOURS PRN
Status: DISCONTINUED | OUTPATIENT
Start: 2021-01-01 | End: 2021-01-01 | Stop reason: HOSPADM

## 2021-01-01 RX ORDER — FUROSEMIDE 10 MG/ML
80 INJECTION INTRAMUSCULAR; INTRAVENOUS ONCE
Status: COMPLETED | OUTPATIENT
Start: 2021-01-01 | End: 2021-01-01

## 2021-01-01 RX ORDER — SODIUM CHLORIDE 9 MG/ML
INJECTION, SOLUTION INTRAVENOUS CONTINUOUS
Status: DISCONTINUED | OUTPATIENT
Start: 2021-01-01 | End: 2021-01-01

## 2021-01-01 RX ORDER — MIDAZOLAM HYDROCHLORIDE 2 MG/2ML
4 INJECTION, SOLUTION INTRAMUSCULAR; INTRAVENOUS ONCE
Status: COMPLETED | OUTPATIENT
Start: 2021-01-01 | End: 2021-01-01

## 2021-01-01 RX ORDER — MORPHINE SULFATE 2 MG/ML
2 INJECTION, SOLUTION INTRAMUSCULAR; INTRAVENOUS
Status: DISCONTINUED | OUTPATIENT
Start: 2021-01-01 | End: 2021-01-01

## 2021-01-01 RX ORDER — 0.9 % SODIUM CHLORIDE 0.9 %
1000 INTRAVENOUS SOLUTION INTRAVENOUS ONCE
Status: COMPLETED | OUTPATIENT
Start: 2021-01-01 | End: 2021-01-01

## 2021-01-01 RX ORDER — LORAZEPAM 2 MG/ML
1 INJECTION INTRAMUSCULAR
Status: DISCONTINUED | OUTPATIENT
Start: 2021-01-01 | End: 2021-01-01 | Stop reason: HOSPADM

## 2021-01-01 RX ORDER — POTASSIUM CHLORIDE 20 MEQ/1
40 TABLET, EXTENDED RELEASE ORAL ONCE
Status: DISCONTINUED | OUTPATIENT
Start: 2021-01-01 | End: 2021-01-01 | Stop reason: HOSPADM

## 2021-01-01 RX ORDER — FENTANYL CITRATE 50 UG/ML
50 INJECTION, SOLUTION INTRAMUSCULAR; INTRAVENOUS ONCE
Status: COMPLETED | OUTPATIENT
Start: 2021-01-01 | End: 2021-01-01

## 2021-01-01 RX ORDER — POLYETHYLENE GLYCOL 3350 17 G/17G
POWDER, FOR SOLUTION ORAL
COMMUNITY
Start: 2021-01-01

## 2021-01-01 RX ORDER — ALBUTEROL SULFATE 2.5 MG/3ML
2.5 SOLUTION RESPIRATORY (INHALATION)
Status: DISPENSED | OUTPATIENT
Start: 2021-01-01 | End: 2021-01-01

## 2021-01-01 RX ORDER — CARVEDILOL 12.5 MG/1
12.5 TABLET ORAL 2 TIMES DAILY WITH MEALS
Status: DISCONTINUED | OUTPATIENT
Start: 2021-01-01 | End: 2021-01-01 | Stop reason: HOSPADM

## 2021-01-01 RX ORDER — SENNA AND DOCUSATE SODIUM 50; 8.6 MG/1; MG/1
1 TABLET, FILM COATED ORAL 2 TIMES DAILY
Status: DISCONTINUED | OUTPATIENT
Start: 2021-01-01 | End: 2021-01-01 | Stop reason: HOSPADM

## 2021-01-01 RX ORDER — ASPIRIN 81 MG/1
81 TABLET, CHEWABLE ORAL DAILY
Status: DISCONTINUED | OUTPATIENT
Start: 2021-01-01 | End: 2021-01-01 | Stop reason: HOSPADM

## 2021-01-01 RX ORDER — LOSARTAN POTASSIUM AND HYDROCHLOROTHIAZIDE 12.5; 5 MG/1; MG/1
TABLET ORAL
COMMUNITY
Start: 2021-01-01 | End: 2021-01-01 | Stop reason: ALTCHOICE

## 2021-01-01 RX ORDER — DEXAMETHASONE SODIUM PHOSPHATE 4 MG/ML
6 INJECTION, SOLUTION INTRA-ARTICULAR; INTRALESIONAL; INTRAMUSCULAR; INTRAVENOUS; SOFT TISSUE EVERY 24 HOURS
Status: DISCONTINUED | OUTPATIENT
Start: 2021-01-01 | End: 2021-01-01 | Stop reason: HOSPADM

## 2021-01-01 RX ORDER — PANTOPRAZOLE SODIUM 40 MG/10ML
40 INJECTION, POWDER, LYOPHILIZED, FOR SOLUTION INTRAVENOUS DAILY
Status: DISCONTINUED | OUTPATIENT
Start: 2021-01-01 | End: 2021-01-01 | Stop reason: HOSPADM

## 2021-01-01 RX ORDER — GUAIFENESIN 600 MG/1
600 TABLET, EXTENDED RELEASE ORAL 2 TIMES DAILY
Status: DISCONTINUED | OUTPATIENT
Start: 2021-01-01 | End: 2021-01-01

## 2021-01-01 RX ORDER — CHLORHEXIDINE GLUCONATE 0.12 MG/ML
15 RINSE ORAL 2 TIMES DAILY
Status: DISCONTINUED | OUTPATIENT
Start: 2021-01-01 | End: 2021-01-01

## 2021-01-01 RX ORDER — LEVALBUTEROL TARTRATE 45 UG/1
1-2 AEROSOL, METERED ORAL EVERY 4 HOURS PRN
COMMUNITY

## 2021-01-01 RX ORDER — ALBUMIN (HUMAN) 12.5 G/50ML
25 SOLUTION INTRAVENOUS ONCE
Status: COMPLETED | OUTPATIENT
Start: 2021-01-01 | End: 2021-01-01

## 2021-01-01 RX ORDER — METHYLPREDNISOLONE 4 MG/1
TABLET ORAL
Qty: 1 KIT | Refills: 0 | Status: SHIPPED | OUTPATIENT
Start: 2021-01-01 | End: 2021-01-01

## 2021-01-01 RX ORDER — PROPOFOL 10 MG/ML
5-50 INJECTION, EMULSION INTRAVENOUS
Status: DISCONTINUED | OUTPATIENT
Start: 2021-01-01 | End: 2021-01-01 | Stop reason: HOSPADM

## 2021-01-01 RX ORDER — ATROPINE SULFATE 0.1 MG/ML
INJECTION INTRAVENOUS
Status: DISPENSED
Start: 2021-01-01 | End: 2021-01-01

## 2021-01-01 RX ORDER — MIDAZOLAM HYDROCHLORIDE 2 MG/2ML
2 INJECTION, SOLUTION INTRAMUSCULAR; INTRAVENOUS ONCE
Status: DISCONTINUED | OUTPATIENT
Start: 2021-01-01 | End: 2021-01-01 | Stop reason: HOSPADM

## 2021-01-01 RX ORDER — COLCHICINE 0.6 MG/1
0.6 TABLET ORAL EVERY OTHER DAY
Status: DISCONTINUED | OUTPATIENT
Start: 2021-01-01 | End: 2021-01-01

## 2021-01-01 RX ORDER — PANTOPRAZOLE SODIUM 40 MG/1
40 TABLET, DELAYED RELEASE ORAL
Status: DISCONTINUED | OUTPATIENT
Start: 2021-01-01 | End: 2021-01-01

## 2021-01-01 RX ORDER — CLONAZEPAM 1 MG/1
0.5 TABLET ORAL 3 TIMES DAILY
Status: DISCONTINUED | OUTPATIENT
Start: 2021-01-01 | End: 2021-01-01

## 2021-01-01 RX ORDER — LABETALOL HYDROCHLORIDE 5 MG/ML
20 INJECTION, SOLUTION INTRAVENOUS EVERY 4 HOURS PRN
Status: DISCONTINUED | OUTPATIENT
Start: 2021-01-01 | End: 2021-01-01

## 2021-01-01 RX ORDER — HYDRALAZINE HYDROCHLORIDE 20 MG/ML
10 INJECTION INTRAMUSCULAR; INTRAVENOUS EVERY 4 HOURS PRN
Status: DISCONTINUED | OUTPATIENT
Start: 2021-01-01 | End: 2021-01-01 | Stop reason: HOSPADM

## 2021-01-01 RX ORDER — LABETALOL HYDROCHLORIDE 5 MG/ML
10 INJECTION, SOLUTION INTRAVENOUS EVERY 4 HOURS PRN
Status: DISCONTINUED | OUTPATIENT
Start: 2021-01-01 | End: 2021-01-01 | Stop reason: HOSPADM

## 2021-01-01 RX ORDER — SODIUM CHLORIDE 9 MG/ML
INJECTION, SOLUTION INTRAVENOUS
Status: DISPENSED
Start: 2021-01-01 | End: 2021-01-01

## 2021-01-01 RX ORDER — PREDNISONE 20 MG/1
40 TABLET ORAL DAILY
Qty: 20 TABLET | Refills: 0 | Status: SHIPPED | OUTPATIENT
Start: 2021-01-01 | End: 2021-01-01

## 2021-01-01 RX ORDER — MAGNESIUM SULFATE 1 G/100ML
1000 INJECTION INTRAVENOUS ONCE
Status: COMPLETED | OUTPATIENT
Start: 2021-01-01 | End: 2021-01-01

## 2021-01-01 RX ORDER — FUROSEMIDE 10 MG/ML
40 INJECTION INTRAMUSCULAR; INTRAVENOUS DAILY
Status: DISCONTINUED | OUTPATIENT
Start: 2021-01-01 | End: 2021-01-01

## 2021-01-01 RX ORDER — FLUTICASONE PROPIONATE 50 MCG
2 SPRAY, SUSPENSION (ML) NASAL DAILY
Status: DISCONTINUED | OUTPATIENT
Start: 2021-01-01 | End: 2021-01-01 | Stop reason: HOSPADM

## 2021-01-01 RX ORDER — DEXTROSE, SODIUM CHLORIDE, SODIUM LACTATE, POTASSIUM CHLORIDE, AND CALCIUM CHLORIDE 5; .6; .31; .03; .02 G/100ML; G/100ML; G/100ML; G/100ML; G/100ML
INJECTION, SOLUTION INTRAVENOUS CONTINUOUS
Status: DISCONTINUED | OUTPATIENT
Start: 2021-01-01 | End: 2021-01-01 | Stop reason: HOSPADM

## 2021-01-01 RX ORDER — FLUDROCORTISONE ACETATE 0.1 MG/1
0.1 TABLET ORAL ONCE
Status: COMPLETED | OUTPATIENT
Start: 2021-01-01 | End: 2021-01-01

## 2021-01-01 RX ORDER — CLONAZEPAM 1 MG/1
0.5 TABLET ORAL 2 TIMES DAILY
Status: DISCONTINUED | OUTPATIENT
Start: 2021-01-01 | End: 2021-01-01

## 2021-01-01 RX ORDER — FUROSEMIDE 20 MG/1
20 TABLET ORAL DAILY
Qty: 10 TABLET | Refills: 0 | Status: SHIPPED | OUTPATIENT
Start: 2021-01-01

## 2021-01-01 RX ORDER — MORPHINE SULFATE 2 MG/ML
2 INJECTION, SOLUTION INTRAMUSCULAR; INTRAVENOUS
Status: DISCONTINUED | OUTPATIENT
Start: 2021-01-01 | End: 2021-01-01 | Stop reason: HOSPADM

## 2021-01-01 RX ORDER — IPRATROPIUM BROMIDE AND ALBUTEROL SULFATE 2.5; .5 MG/3ML; MG/3ML
1 SOLUTION RESPIRATORY (INHALATION) ONCE
Status: COMPLETED | OUTPATIENT
Start: 2021-01-01 | End: 2021-01-01

## 2021-01-01 RX ORDER — PRAVASTATIN SODIUM 40 MG
40 TABLET ORAL NIGHTLY
Status: DISCONTINUED | OUTPATIENT
Start: 2021-01-01 | End: 2021-01-01 | Stop reason: HOSPADM

## 2021-01-01 RX ORDER — LEVOCETIRIZINE DIHYDROCHLORIDE 5 MG/1
TABLET, FILM COATED ORAL
COMMUNITY
Start: 2021-01-01

## 2021-01-01 RX ORDER — SODIUM CHLORIDE 9 MG/ML
10 INJECTION INTRAVENOUS DAILY
Status: DISCONTINUED | OUTPATIENT
Start: 2021-01-01 | End: 2021-01-01 | Stop reason: HOSPADM

## 2021-01-01 RX ADMIN — DEXAMETHASONE SODIUM PHOSPHATE 6 MG: 4 INJECTION, SOLUTION INTRAMUSCULAR; INTRAVENOUS at 08:23

## 2021-01-01 RX ADMIN — SODIUM CHLORIDE 1.4 MCG/KG/HR: 9 INJECTION, SOLUTION INTRAVENOUS at 14:31

## 2021-01-01 RX ADMIN — SODIUM CHLORIDE 1.4 MCG/KG/HR: 9 INJECTION, SOLUTION INTRAVENOUS at 22:34

## 2021-01-01 RX ADMIN — EPINEPHRINE 30 MCG/MIN: 1 INJECTION INTRAMUSCULAR; INTRAVENOUS; SUBCUTANEOUS at 01:34

## 2021-01-01 RX ADMIN — SODIUM CHLORIDE 1.4 MCG/KG/HR: 9 INJECTION, SOLUTION INTRAVENOUS at 02:07

## 2021-01-01 RX ADMIN — DOCUSATE SODIUM 50 MG AND SENNOSIDES 8.6 MG 1 TABLET: 8.6; 5 TABLET, FILM COATED ORAL at 21:01

## 2021-01-01 RX ADMIN — AZTREONAM 1000 MG: 1 INJECTION, POWDER, LYOPHILIZED, FOR SOLUTION INTRAMUSCULAR; INTRAVENOUS at 05:03

## 2021-01-01 RX ADMIN — DEXAMETHASONE SODIUM PHOSPHATE 6 MG: 4 INJECTION, SOLUTION INTRAMUSCULAR; INTRAVENOUS at 07:46

## 2021-01-01 RX ADMIN — SODIUM CHLORIDE: 9 INJECTION, SOLUTION INTRAVENOUS at 14:55

## 2021-01-01 RX ADMIN — HYDRALAZINE HYDROCHLORIDE 20 MG: 20 INJECTION INTRAMUSCULAR; INTRAVENOUS at 19:53

## 2021-01-01 RX ADMIN — HYDRALAZINE HYDROCHLORIDE 20 MG: 20 INJECTION INTRAMUSCULAR; INTRAVENOUS at 12:20

## 2021-01-01 RX ADMIN — DEXAMETHASONE SODIUM PHOSPHATE 6 MG: 4 INJECTION, SOLUTION INTRAMUSCULAR; INTRAVENOUS at 07:57

## 2021-01-01 RX ADMIN — HEPARIN SODIUM 5000 UNITS: 5000 INJECTION INTRAVENOUS; SUBCUTANEOUS at 15:09

## 2021-01-01 RX ADMIN — ALBUTEROL SULFATE 2 PUFF: 108 AEROSOL, METERED RESPIRATORY (INHALATION) at 17:41

## 2021-01-01 RX ADMIN — PANTOPRAZOLE SODIUM 40 MG: 40 INJECTION, POWDER, FOR SOLUTION INTRAVENOUS at 08:31

## 2021-01-01 RX ADMIN — PANTOPRAZOLE SODIUM 40 MG: 40 TABLET, DELAYED RELEASE ORAL at 05:31

## 2021-01-01 RX ADMIN — CHLORHEXIDINE GLUCONATE 15 ML: 1.2 RINSE ORAL at 20:00

## 2021-01-01 RX ADMIN — FUROSEMIDE 80 MG: 10 INJECTION, SOLUTION INTRAMUSCULAR; INTRAVENOUS at 20:19

## 2021-01-01 RX ADMIN — HYDRALAZINE HYDROCHLORIDE 20 MG: 20 INJECTION INTRAMUSCULAR; INTRAVENOUS at 04:06

## 2021-01-01 RX ADMIN — PRAVASTATIN SODIUM 40 MG: 40 TABLET ORAL at 21:52

## 2021-01-01 RX ADMIN — CARVEDILOL 12.5 MG: 12.5 TABLET, FILM COATED ORAL at 10:10

## 2021-01-01 RX ADMIN — IPRATROPIUM BROMIDE AND ALBUTEROL 1 PUFF: 20; 100 SPRAY, METERED RESPIRATORY (INHALATION) at 11:08

## 2021-01-01 RX ADMIN — Medication 3 MCG/MIN: at 18:59

## 2021-01-01 RX ADMIN — DOCUSATE SODIUM 50 MG AND SENNOSIDES 8.6 MG 1 TABLET: 8.6; 5 TABLET, FILM COATED ORAL at 07:49

## 2021-01-01 RX ADMIN — SODIUM CHLORIDE 1.4 MCG/KG/HR: 9 INJECTION, SOLUTION INTRAVENOUS at 23:45

## 2021-01-01 RX ADMIN — SODIUM CHLORIDE 0.7 MCG/KG/HR: 9 INJECTION, SOLUTION INTRAVENOUS at 08:17

## 2021-01-01 RX ADMIN — REMDESIVIR 100 MG: 100 INJECTION, POWDER, LYOPHILIZED, FOR SOLUTION INTRAVENOUS at 10:34

## 2021-01-01 RX ADMIN — ASPIRIN 81 MG: 81 TABLET, CHEWABLE ORAL at 07:58

## 2021-01-01 RX ADMIN — CITALOPRAM HYDROBROMIDE 20 MG: 20 TABLET ORAL at 09:49

## 2021-01-01 RX ADMIN — AZTREONAM 1000 MG: 1 INJECTION, POWDER, LYOPHILIZED, FOR SOLUTION INTRAMUSCULAR; INTRAVENOUS at 00:35

## 2021-01-01 RX ADMIN — SODIUM CHLORIDE 1.4 MCG/KG/HR: 9 INJECTION, SOLUTION INTRAVENOUS at 02:33

## 2021-01-01 RX ADMIN — LACTULOSE 20 G: 20 SOLUTION ORAL at 10:52

## 2021-01-01 RX ADMIN — FENTANYL CITRATE 50 MCG: 50 INJECTION INTRAMUSCULAR; INTRAVENOUS at 11:15

## 2021-01-01 RX ADMIN — IPRATROPIUM BROMIDE AND ALBUTEROL 1 PUFF: 20; 100 SPRAY, METERED RESPIRATORY (INHALATION) at 23:56

## 2021-01-01 RX ADMIN — REMDESIVIR 100 MG: 100 INJECTION, POWDER, LYOPHILIZED, FOR SOLUTION INTRAVENOUS at 07:37

## 2021-01-01 RX ADMIN — MORPHINE SULFATE 2 MG: 2 INJECTION, SOLUTION INTRAMUSCULAR; INTRAVENOUS at 07:33

## 2021-01-01 RX ADMIN — COLCHICINE 0.6 MG: 0.6 TABLET, FILM COATED ORAL at 10:09

## 2021-01-01 RX ADMIN — CHLORHEXIDINE GLUCONATE 15 ML: 1.2 RINSE ORAL at 21:36

## 2021-01-01 RX ADMIN — LORAZEPAM 1 MG: 2 INJECTION INTRAMUSCULAR; INTRAVENOUS at 20:33

## 2021-01-01 RX ADMIN — HYDRALAZINE HYDROCHLORIDE 10 MG: 20 INJECTION INTRAMUSCULAR; INTRAVENOUS at 10:46

## 2021-01-01 RX ADMIN — ASPIRIN 81 MG: 81 TABLET, CHEWABLE ORAL at 09:33

## 2021-01-01 RX ADMIN — IOPAMIDOL 100 ML: 755 INJECTION, SOLUTION INTRAVENOUS at 16:26

## 2021-01-01 RX ADMIN — REMDESIVIR 100 MG: 100 INJECTION, POWDER, LYOPHILIZED, FOR SOLUTION INTRAVENOUS at 08:40

## 2021-01-01 RX ADMIN — CLONAZEPAM 0.25 MG: 0.5 TABLET ORAL at 21:01

## 2021-01-01 RX ADMIN — SODIUM CHLORIDE 0.9 MCG/KG/HR: 9 INJECTION, SOLUTION INTRAVENOUS at 22:51

## 2021-01-01 RX ADMIN — IPRATROPIUM BROMIDE AND ALBUTEROL 1 PUFF: 20; 100 SPRAY, METERED RESPIRATORY (INHALATION) at 20:07

## 2021-01-01 RX ADMIN — IPRATROPIUM BROMIDE AND ALBUTEROL 1 PUFF: 20; 100 SPRAY, METERED RESPIRATORY (INHALATION) at 20:28

## 2021-01-01 RX ADMIN — MAGNESIUM SULFATE HEPTAHYDRATE 1000 MG: 1 INJECTION, SOLUTION INTRAVENOUS at 15:08

## 2021-01-01 RX ADMIN — PROPOFOL 40 MCG/KG/MIN: 10 INJECTION, EMULSION INTRAVENOUS at 20:37

## 2021-01-01 RX ADMIN — SODIUM CHLORIDE 0.5 MCG/KG/HR: 9 INJECTION, SOLUTION INTRAVENOUS at 21:04

## 2021-01-01 RX ADMIN — CHLORHEXIDINE GLUCONATE 15 ML: 1.2 RINSE ORAL at 09:50

## 2021-01-01 RX ADMIN — SODIUM CHLORIDE 1.4 MCG/KG/HR: 9 INJECTION, SOLUTION INTRAVENOUS at 00:28

## 2021-01-01 RX ADMIN — FENTANYL CITRATE 100 MCG/HR: 0.05 INJECTION, SOLUTION INTRAMUSCULAR; INTRAVENOUS at 17:35

## 2021-01-01 RX ADMIN — ASPIRIN 81 MG: 81 TABLET, CHEWABLE ORAL at 08:23

## 2021-01-01 RX ADMIN — FUROSEMIDE 40 MG: 10 INJECTION, SOLUTION INTRAMUSCULAR; INTRAVENOUS at 10:08

## 2021-01-01 RX ADMIN — FENTANYL CITRATE 150 MCG/HR: 0.05 INJECTION, SOLUTION INTRAMUSCULAR; INTRAVENOUS at 06:58

## 2021-01-01 RX ADMIN — SODIUM CHLORIDE: 9 INJECTION, SOLUTION INTRAVENOUS at 06:10

## 2021-01-01 RX ADMIN — PROPOFOL 20 MCG/KG/MIN: 10 INJECTION, EMULSION INTRAVENOUS at 13:24

## 2021-01-01 RX ADMIN — ASPIRIN 81 MG: 81 TABLET, CHEWABLE ORAL at 09:02

## 2021-01-01 RX ADMIN — LABETALOL HYDROCHLORIDE 10 MG: 5 INJECTION, SOLUTION INTRAVENOUS at 09:50

## 2021-01-01 RX ADMIN — CLONAZEPAM 0.25 MG: 0.5 TABLET ORAL at 08:31

## 2021-01-01 RX ADMIN — MORPHINE SULFATE 2 MG: 2 INJECTION, SOLUTION INTRAMUSCULAR; INTRAVENOUS at 10:39

## 2021-01-01 RX ADMIN — PRAVASTATIN SODIUM 40 MG: 40 TABLET ORAL at 19:48

## 2021-01-01 RX ADMIN — SODIUM CHLORIDE 0.4 MCG/KG/HR: 9 INJECTION, SOLUTION INTRAVENOUS at 07:28

## 2021-01-01 RX ADMIN — CARVEDILOL 12.5 MG: 12.5 TABLET, FILM COATED ORAL at 16:24

## 2021-01-01 RX ADMIN — AZTREONAM 1000 MG: 1 INJECTION, POWDER, LYOPHILIZED, FOR SOLUTION INTRAMUSCULAR; INTRAVENOUS at 17:35

## 2021-01-01 RX ADMIN — IPRATROPIUM BROMIDE AND ALBUTEROL 1 PUFF: 20; 100 SPRAY, METERED RESPIRATORY (INHALATION) at 03:53

## 2021-01-01 RX ADMIN — IPRATROPIUM BROMIDE AND ALBUTEROL 1 PUFF: 20; 100 SPRAY, METERED RESPIRATORY (INHALATION) at 04:27

## 2021-01-01 RX ADMIN — VASOPRESSIN 0.04 UNITS/MIN: 20 INJECTION INTRAVENOUS at 12:09

## 2021-01-01 RX ADMIN — AZTREONAM 1000 MG: 1 INJECTION, POWDER, LYOPHILIZED, FOR SOLUTION INTRAMUSCULAR; INTRAVENOUS at 15:06

## 2021-01-01 RX ADMIN — SODIUM CHLORIDE 250 ML: 9 INJECTION, SOLUTION INTRAVENOUS at 12:14

## 2021-01-01 RX ADMIN — AZTREONAM 1000 MG: 1 INJECTION, POWDER, LYOPHILIZED, FOR SOLUTION INTRAMUSCULAR; INTRAVENOUS at 16:07

## 2021-01-01 RX ADMIN — PANTOPRAZOLE SODIUM 40 MG: 40 INJECTION, POWDER, FOR SOLUTION INTRAVENOUS at 07:49

## 2021-01-01 RX ADMIN — AZTREONAM 1000 MG: 1 INJECTION, POWDER, LYOPHILIZED, FOR SOLUTION INTRAMUSCULAR; INTRAVENOUS at 20:15

## 2021-01-01 RX ADMIN — FUROSEMIDE 20 MG: 10 INJECTION, SOLUTION INTRAMUSCULAR; INTRAVENOUS at 08:44

## 2021-01-01 RX ADMIN — VALSARTAN 320 MG: 160 TABLET, FILM COATED ORAL at 10:08

## 2021-01-01 RX ADMIN — METHYLPREDNISOLONE SODIUM SUCCINATE 125 MG: 125 INJECTION, POWDER, FOR SOLUTION INTRAMUSCULAR; INTRAVENOUS at 13:47

## 2021-01-01 RX ADMIN — SODIUM BICARBONATE 50 MEQ: 84 INJECTION, SOLUTION INTRAVENOUS at 03:25

## 2021-01-01 RX ADMIN — LABETALOL HYDROCHLORIDE 20 MG: 5 INJECTION, SOLUTION INTRAVENOUS at 17:10

## 2021-01-01 RX ADMIN — IPRATROPIUM BROMIDE AND ALBUTEROL 1 PUFF: 20; 100 SPRAY, METERED RESPIRATORY (INHALATION) at 08:52

## 2021-01-01 RX ADMIN — AZTREONAM 1000 MG: 1 INJECTION, POWDER, LYOPHILIZED, FOR SOLUTION INTRAMUSCULAR; INTRAVENOUS at 05:11

## 2021-01-01 RX ADMIN — Medication 60 MCG/MIN: at 15:01

## 2021-01-01 RX ADMIN — ASPIRIN 81 MG: 81 TABLET, CHEWABLE ORAL at 09:49

## 2021-01-01 RX ADMIN — HYDRALAZINE HYDROCHLORIDE 10 MG: 20 INJECTION INTRAMUSCULAR; INTRAVENOUS at 10:36

## 2021-01-01 RX ADMIN — Medication 200 MG: at 11:15

## 2021-01-01 RX ADMIN — DEXAMETHASONE SODIUM PHOSPHATE 6 MG: 4 INJECTION, SOLUTION INTRAMUSCULAR; INTRAVENOUS at 09:50

## 2021-01-01 RX ADMIN — PROPOFOL 50 MCG/KG/MIN: 10 INJECTION, EMULSION INTRAVENOUS at 11:00

## 2021-01-01 RX ADMIN — DOCUSATE SODIUM 50 MG AND SENNOSIDES 8.6 MG 1 TABLET: 8.6; 5 TABLET, FILM COATED ORAL at 08:31

## 2021-01-01 RX ADMIN — FENTANYL CITRATE 150 MCG/HR: 0.05 INJECTION, SOLUTION INTRAMUSCULAR; INTRAVENOUS at 16:04

## 2021-01-01 RX ADMIN — AZTREONAM 1000 MG: 1 INJECTION, POWDER, LYOPHILIZED, FOR SOLUTION INTRAMUSCULAR; INTRAVENOUS at 04:48

## 2021-01-01 RX ADMIN — LACTULOSE 20 G: 20 SOLUTION ORAL at 17:12

## 2021-01-01 RX ADMIN — PROPOFOL 25 MCG/KG/MIN: 10 INJECTION, EMULSION INTRAVENOUS at 16:23

## 2021-01-01 RX ADMIN — CHLORHEXIDINE GLUCONATE 15 ML: 1.2 RINSE ORAL at 21:01

## 2021-01-01 RX ADMIN — SODIUM CHLORIDE 0.9 MCG/KG/HR: 9 INJECTION, SOLUTION INTRAVENOUS at 09:04

## 2021-01-01 RX ADMIN — AZTREONAM 1000 MG: 1 INJECTION, POWDER, LYOPHILIZED, FOR SOLUTION INTRAMUSCULAR; INTRAVENOUS at 13:35

## 2021-01-01 RX ADMIN — SODIUM CHLORIDE 0.7 MCG/KG/HR: 9 INJECTION, SOLUTION INTRAVENOUS at 13:21

## 2021-01-01 RX ADMIN — EPINEPHRINE 30 MCG/MIN: 1 INJECTION INTRAMUSCULAR; INTRAVENOUS; SUBCUTANEOUS at 22:50

## 2021-01-01 RX ADMIN — ASPIRIN 81 MG: 81 TABLET, CHEWABLE ORAL at 08:31

## 2021-01-01 RX ADMIN — FENTANYL CITRATE 200 MCG/HR: 0.05 INJECTION, SOLUTION INTRAMUSCULAR; INTRAVENOUS at 20:24

## 2021-01-01 RX ADMIN — SODIUM CHLORIDE 1.4 MCG/KG/HR: 9 INJECTION, SOLUTION INTRAVENOUS at 17:35

## 2021-01-01 RX ADMIN — PROPOFOL 20 MCG/KG/MIN: 10 INJECTION, EMULSION INTRAVENOUS at 22:45

## 2021-01-01 RX ADMIN — PRAVASTATIN SODIUM 40 MG: 40 TABLET ORAL at 20:03

## 2021-01-01 RX ADMIN — PROPOFOL 50 MCG/KG/MIN: 10 INJECTION, EMULSION INTRAVENOUS at 20:08

## 2021-01-01 RX ADMIN — SODIUM CHLORIDE 1.4 MCG/KG/HR: 9 INJECTION, SOLUTION INTRAVENOUS at 05:26

## 2021-01-01 RX ADMIN — FENTANYL CITRATE 150 MCG/HR: 0.05 INJECTION, SOLUTION INTRAMUSCULAR; INTRAVENOUS at 22:48

## 2021-01-01 RX ADMIN — PROPOFOL 30 MCG/KG/MIN: 10 INJECTION, EMULSION INTRAVENOUS at 22:47

## 2021-01-01 RX ADMIN — IPRATROPIUM BROMIDE AND ALBUTEROL 1 PUFF: 20; 100 SPRAY, METERED RESPIRATORY (INHALATION) at 05:15

## 2021-01-01 RX ADMIN — REMDESIVIR 100 MG: 100 INJECTION, POWDER, LYOPHILIZED, FOR SOLUTION INTRAVENOUS at 07:59

## 2021-01-01 RX ADMIN — SODIUM CHLORIDE 1.4 MCG/KG/HR: 9 INJECTION, SOLUTION INTRAVENOUS at 09:25

## 2021-01-01 RX ADMIN — HYDRALAZINE HYDROCHLORIDE 10 MG: 20 INJECTION INTRAMUSCULAR; INTRAVENOUS at 18:33

## 2021-01-01 RX ADMIN — SODIUM CHLORIDE 1.4 MCG/KG/HR: 9 INJECTION, SOLUTION INTRAVENOUS at 18:52

## 2021-01-01 RX ADMIN — FUROSEMIDE 40 MG: 10 INJECTION, SOLUTION INTRAMUSCULAR; INTRAVENOUS at 16:26

## 2021-01-01 RX ADMIN — SODIUM CHLORIDE, SODIUM LACTATE, POTASSIUM CHLORIDE, CALCIUM CHLORIDE AND DEXTROSE MONOHYDRATE: 5; 600; 310; 30; 20 INJECTION, SOLUTION INTRAVENOUS at 11:08

## 2021-01-01 RX ADMIN — LORAZEPAM 1 MG: 2 INJECTION INTRAMUSCULAR; INTRAVENOUS at 10:40

## 2021-01-01 RX ADMIN — CHLORHEXIDINE GLUCONATE 15 ML: 1.2 RINSE ORAL at 21:20

## 2021-01-01 RX ADMIN — GUAIFENESIN 600 MG: 600 TABLET ORAL at 10:09

## 2021-01-01 RX ADMIN — PROPOFOL 20 MCG/KG/MIN: 10 INJECTION, EMULSION INTRAVENOUS at 03:53

## 2021-01-01 RX ADMIN — FENTANYL CITRATE 200 MCG/HR: 0.05 INJECTION, SOLUTION INTRAMUSCULAR; INTRAVENOUS at 15:38

## 2021-01-01 RX ADMIN — MORPHINE SULFATE 2 MG: 2 INJECTION, SOLUTION INTRAMUSCULAR; INTRAVENOUS at 18:39

## 2021-01-01 RX ADMIN — PRAVASTATIN SODIUM 40 MG: 40 TABLET ORAL at 21:30

## 2021-01-01 RX ADMIN — AZTREONAM 1000 MG: 1 INJECTION, POWDER, LYOPHILIZED, FOR SOLUTION INTRAMUSCULAR; INTRAVENOUS at 04:07

## 2021-01-01 RX ADMIN — MAGNESIUM SULFATE HEPTAHYDRATE 2000 MG: 40 INJECTION, SOLUTION INTRAVENOUS at 11:10

## 2021-01-01 RX ADMIN — FENTANYL CITRATE 125 MCG/HR: 0.05 INJECTION, SOLUTION INTRAMUSCULAR; INTRAVENOUS at 03:55

## 2021-01-01 RX ADMIN — PRAVASTATIN SODIUM 40 MG: 40 TABLET ORAL at 21:07

## 2021-01-01 RX ADMIN — SODIUM CHLORIDE 0.9 MCG/KG/HR: 9 INJECTION, SOLUTION INTRAVENOUS at 21:53

## 2021-01-01 RX ADMIN — AZTREONAM 1000 MG: 1 INJECTION, POWDER, LYOPHILIZED, FOR SOLUTION INTRAMUSCULAR; INTRAVENOUS at 13:58

## 2021-01-01 RX ADMIN — VASOPRESSIN 0.04 UNITS/MIN: 20 INJECTION INTRAVENOUS at 03:35

## 2021-01-01 RX ADMIN — PROPOFOL 20 MCG/KG/MIN: 10 INJECTION, EMULSION INTRAVENOUS at 15:02

## 2021-01-01 RX ADMIN — MORPHINE SULFATE 2 MG: 2 INJECTION, SOLUTION INTRAMUSCULAR; INTRAVENOUS at 08:20

## 2021-01-01 RX ADMIN — SODIUM CHLORIDE 1.4 MCG/KG/HR: 9 INJECTION, SOLUTION INTRAVENOUS at 11:51

## 2021-01-01 RX ADMIN — SODIUM CHLORIDE 500 ML: 9 INJECTION, SOLUTION INTRAVENOUS at 15:08

## 2021-01-01 RX ADMIN — FENTANYL CITRATE 25 MCG/HR: 0.05 INJECTION, SOLUTION INTRAMUSCULAR; INTRAVENOUS at 08:12

## 2021-01-01 RX ADMIN — CITALOPRAM HYDROBROMIDE 20 MG: 20 TABLET ORAL at 07:58

## 2021-01-01 RX ADMIN — SODIUM CHLORIDE 0.6 MCG/KG/HR: 9 INJECTION, SOLUTION INTRAVENOUS at 00:29

## 2021-01-01 RX ADMIN — SODIUM CHLORIDE 1.4 MCG/KG/HR: 9 INJECTION, SOLUTION INTRAVENOUS at 07:33

## 2021-01-01 RX ADMIN — VANCOMYCIN HYDROCHLORIDE 1500 MG: 5 INJECTION, POWDER, LYOPHILIZED, FOR SOLUTION INTRAVENOUS at 18:05

## 2021-01-01 RX ADMIN — PROPOFOL 50 MCG/KG/MIN: 10 INJECTION, EMULSION INTRAVENOUS at 13:09

## 2021-01-01 RX ADMIN — GUAIFENESIN 600 MG: 600 TABLET ORAL at 21:52

## 2021-01-01 RX ADMIN — PROPOFOL 50 MCG/KG/MIN: 10 INJECTION, EMULSION INTRAVENOUS at 22:12

## 2021-01-01 RX ADMIN — COLCHICINE 0.6 MG: 0.6 TABLET, FILM COATED ORAL at 09:02

## 2021-01-01 RX ADMIN — SODIUM CHLORIDE 250 ML: 9 INJECTION, SOLUTION INTRAVENOUS at 12:30

## 2021-01-01 RX ADMIN — FENTANYL CITRATE 75 MCG/HR: 0.05 INJECTION, SOLUTION INTRAMUSCULAR; INTRAVENOUS at 12:29

## 2021-01-01 RX ADMIN — PANTOPRAZOLE SODIUM 40 MG: 40 TABLET, DELAYED RELEASE ORAL at 06:05

## 2021-01-01 RX ADMIN — VALSARTAN 320 MG: 160 TABLET, FILM COATED ORAL at 07:40

## 2021-01-01 RX ADMIN — EPINEPHRINE 5 MCG/MIN: 1 INJECTION INTRAMUSCULAR; INTRAVENOUS; SUBCUTANEOUS at 12:00

## 2021-01-01 RX ADMIN — EPINEPHRINE 30 MCG/MIN: 1 INJECTION INTRAMUSCULAR; INTRAVENOUS; SUBCUTANEOUS at 15:40

## 2021-01-01 RX ADMIN — CARVEDILOL 6.25 MG: 6.25 TABLET, FILM COATED ORAL at 17:23

## 2021-01-01 RX ADMIN — MICONAZOLE NITRATE: 2 POWDER TOPICAL at 07:49

## 2021-01-01 RX ADMIN — Medication 69.97 MCG/MIN: at 04:47

## 2021-01-01 RX ADMIN — CHLORHEXIDINE GLUCONATE 15 ML: 1.2 RINSE ORAL at 11:09

## 2021-01-01 RX ADMIN — SODIUM CHLORIDE 0.4 MCG/KG/HR: 9 INJECTION, SOLUTION INTRAVENOUS at 11:02

## 2021-01-01 RX ADMIN — PANTOPRAZOLE SODIUM 40 MG: 40 TABLET, DELAYED RELEASE ORAL at 07:58

## 2021-01-01 RX ADMIN — PROPOFOL 30 MCG/KG/MIN: 10 INJECTION, EMULSION INTRAVENOUS at 06:14

## 2021-01-01 RX ADMIN — MICONAZOLE NITRATE: 2 POWDER TOPICAL at 21:37

## 2021-01-01 RX ADMIN — FENTANYL CITRATE 150 MCG/HR: 0.05 INJECTION, SOLUTION INTRAMUSCULAR; INTRAVENOUS at 10:43

## 2021-01-01 RX ADMIN — HYDRALAZINE HYDROCHLORIDE 10 MG: 20 INJECTION INTRAMUSCULAR; INTRAVENOUS at 00:09

## 2021-01-01 RX ADMIN — DOCUSATE SODIUM 50 MG AND SENNOSIDES 8.6 MG 1 TABLET: 8.6; 5 TABLET, FILM COATED ORAL at 20:00

## 2021-01-01 RX ADMIN — ETOMIDATE 40 MG: 2 INJECTION INTRAVENOUS at 11:15

## 2021-01-01 RX ADMIN — GENTAMICIN SULFATE 257.6 MG: 40 INJECTION, SOLUTION INTRAMUSCULAR; INTRAVENOUS at 01:10

## 2021-01-01 RX ADMIN — IOPAMIDOL 100 ML: 755 INJECTION, SOLUTION INTRAVENOUS at 20:39

## 2021-01-01 RX ADMIN — PROPOFOL 30 MCG/KG/MIN: 10 INJECTION, EMULSION INTRAVENOUS at 19:59

## 2021-01-01 RX ADMIN — PROPOFOL 50 MCG/KG/MIN: 10 INJECTION, EMULSION INTRAVENOUS at 09:34

## 2021-01-01 RX ADMIN — MICONAZOLE NITRATE: 2 POWDER TOPICAL at 21:21

## 2021-01-01 RX ADMIN — SODIUM CHLORIDE: 9 INJECTION, SOLUTION INTRAVENOUS at 00:05

## 2021-01-01 RX ADMIN — SODIUM CHLORIDE, PRESERVATIVE FREE 10 ML: 5 INJECTION INTRAVENOUS at 09:35

## 2021-01-01 RX ADMIN — CARVEDILOL 6.25 MG: 6.25 TABLET, FILM COATED ORAL at 09:04

## 2021-01-01 RX ADMIN — GUAIFENESIN 600 MG: 600 TABLET ORAL at 01:20

## 2021-01-01 RX ADMIN — Medication 10 MCG/MIN: at 22:13

## 2021-01-01 RX ADMIN — VALSARTAN 320 MG: 160 TABLET, FILM COATED ORAL at 15:47

## 2021-01-01 RX ADMIN — MICONAZOLE NITRATE: 2 POWDER TOPICAL at 20:16

## 2021-01-01 RX ADMIN — CARVEDILOL 6.25 MG: 6.25 TABLET, FILM COATED ORAL at 07:58

## 2021-01-01 RX ADMIN — BARICITINIB 2 MG: 2 TABLET, FILM COATED ORAL at 15:31

## 2021-01-01 RX ADMIN — Medication 2 MCG/MIN: at 12:15

## 2021-01-01 RX ADMIN — PROPOFOL 30 MCG/KG/MIN: 10 INJECTION, EMULSION INTRAVENOUS at 23:47

## 2021-01-01 RX ADMIN — CITALOPRAM HYDROBROMIDE 20 MG: 20 TABLET ORAL at 09:02

## 2021-01-01 RX ADMIN — DEXAMETHASONE SODIUM PHOSPHATE 6 MG: 4 INJECTION, SOLUTION INTRAMUSCULAR; INTRAVENOUS at 10:08

## 2021-01-01 RX ADMIN — PROPOFOL 25 MCG/KG/MIN: 10 INJECTION, EMULSION INTRAVENOUS at 11:51

## 2021-01-01 RX ADMIN — FENTANYL CITRATE 100 MCG/HR: 0.05 INJECTION, SOLUTION INTRAMUSCULAR; INTRAVENOUS at 22:51

## 2021-01-01 RX ADMIN — LABETALOL HYDROCHLORIDE 10 MG: 5 INJECTION, SOLUTION INTRAVENOUS at 09:37

## 2021-01-01 RX ADMIN — AZTREONAM 1000 MG: 1 INJECTION, POWDER, LYOPHILIZED, FOR SOLUTION INTRAMUSCULAR; INTRAVENOUS at 15:52

## 2021-01-01 RX ADMIN — FUROSEMIDE 20 MG: 10 INJECTION, SOLUTION INTRAMUSCULAR; INTRAVENOUS at 13:48

## 2021-01-01 RX ADMIN — MORPHINE SULFATE 2 MG: 2 INJECTION, SOLUTION INTRAMUSCULAR; INTRAVENOUS at 00:09

## 2021-01-01 RX ADMIN — SODIUM CHLORIDE 1.4 MCG/KG/HR: 9 INJECTION, SOLUTION INTRAVENOUS at 12:00

## 2021-01-01 RX ADMIN — AZTREONAM 1000 MG: 1 INJECTION, POWDER, LYOPHILIZED, FOR SOLUTION INTRAMUSCULAR; INTRAVENOUS at 06:30

## 2021-01-01 RX ADMIN — MORPHINE SULFATE 2 MG: 2 INJECTION, SOLUTION INTRAMUSCULAR; INTRAVENOUS at 04:07

## 2021-01-01 RX ADMIN — ASPIRIN 81 MG: 81 TABLET, CHEWABLE ORAL at 07:49

## 2021-01-01 RX ADMIN — ETOMIDATE 40 MG: 2 INJECTION, SOLUTION INTRAVENOUS at 11:15

## 2021-01-01 RX ADMIN — AZTREONAM 1000 MG: 1 INJECTION, POWDER, LYOPHILIZED, FOR SOLUTION INTRAMUSCULAR; INTRAVENOUS at 10:52

## 2021-01-01 RX ADMIN — HYDRALAZINE HYDROCHLORIDE 10 MG: 20 INJECTION INTRAMUSCULAR; INTRAVENOUS at 15:03

## 2021-01-01 RX ADMIN — SODIUM CHLORIDE, PRESERVATIVE FREE 10 ML: 5 INJECTION INTRAVENOUS at 08:34

## 2021-01-01 RX ADMIN — MORPHINE SULFATE 2 MG: 2 INJECTION, SOLUTION INTRAMUSCULAR; INTRAVENOUS at 08:32

## 2021-01-01 RX ADMIN — SODIUM CHLORIDE 0.2 MCG/KG/HR: 9 INJECTION, SOLUTION INTRAVENOUS at 00:04

## 2021-01-01 RX ADMIN — EPINEPHRINE 30 MCG/MIN: 1 INJECTION INTRAMUSCULAR; INTRAVENOUS; SUBCUTANEOUS at 07:42

## 2021-01-01 RX ADMIN — LABETALOL HYDROCHLORIDE 10 MG: 5 INJECTION, SOLUTION INTRAVENOUS at 10:52

## 2021-01-01 RX ADMIN — LORAZEPAM 1 MG: 2 INJECTION INTRAMUSCULAR; INTRAVENOUS at 16:44

## 2021-01-01 RX ADMIN — IPRATROPIUM BROMIDE AND ALBUTEROL 1 PUFF: 20; 100 SPRAY, METERED RESPIRATORY (INHALATION) at 05:17

## 2021-01-01 RX ADMIN — SODIUM CHLORIDE 0.6 MCG/KG/HR: 9 INJECTION, SOLUTION INTRAVENOUS at 20:38

## 2021-01-01 RX ADMIN — METHYLPREDNISOLONE SODIUM SUCCINATE 125 MG: 125 INJECTION, POWDER, FOR SOLUTION INTRAMUSCULAR; INTRAVENOUS at 18:13

## 2021-01-01 RX ADMIN — MIDAZOLAM HYDROCHLORIDE 4 MG: 1 INJECTION, SOLUTION INTRAMUSCULAR; INTRAVENOUS at 11:15

## 2021-01-01 RX ADMIN — REMDESIVIR 100 MG: 100 INJECTION, POWDER, LYOPHILIZED, FOR SOLUTION INTRAVENOUS at 08:44

## 2021-01-01 RX ADMIN — LACTULOSE 20 G: 20 SOLUTION ORAL at 00:26

## 2021-01-01 RX ADMIN — HYDRALAZINE HYDROCHLORIDE 10 MG: 20 INJECTION INTRAMUSCULAR; INTRAVENOUS at 07:33

## 2021-01-01 RX ADMIN — SODIUM CHLORIDE, POTASSIUM CHLORIDE, SODIUM LACTATE AND CALCIUM CHLORIDE 500 ML: 600; 310; 30; 20 INJECTION, SOLUTION INTRAVENOUS at 13:34

## 2021-01-01 RX ADMIN — SODIUM CHLORIDE, SODIUM LACTATE, POTASSIUM CHLORIDE, CALCIUM CHLORIDE AND DEXTROSE MONOHYDRATE: 5; 600; 310; 30; 20 INJECTION, SOLUTION INTRAVENOUS at 17:42

## 2021-01-01 RX ADMIN — MICONAZOLE NITRATE: 2 POWDER TOPICAL at 11:00

## 2021-01-01 RX ADMIN — Medication 100 MCG/MIN: at 08:16

## 2021-01-01 RX ADMIN — SODIUM CHLORIDE 0.6 MCG/KG/HR: 9 INJECTION, SOLUTION INTRAVENOUS at 05:51

## 2021-01-01 RX ADMIN — EPINEPHRINE 30 MCG/MIN: 1 INJECTION INTRAMUSCULAR; INTRAVENOUS; SUBCUTANEOUS at 20:23

## 2021-01-01 RX ADMIN — SODIUM CHLORIDE: 9 INJECTION, SOLUTION INTRAVENOUS at 15:11

## 2021-01-01 RX ADMIN — SODIUM CHLORIDE 1.4 MCG/KG/HR: 9 INJECTION, SOLUTION INTRAVENOUS at 14:01

## 2021-01-01 RX ADMIN — HYDRALAZINE HYDROCHLORIDE 10 MG: 20 INJECTION INTRAMUSCULAR; INTRAVENOUS at 10:57

## 2021-01-01 RX ADMIN — ASPIRIN 81 MG: 81 TABLET, CHEWABLE ORAL at 10:11

## 2021-01-01 RX ADMIN — FLUTICASONE PROPIONATE 2 SPRAY: 50 SPRAY, METERED NASAL at 09:39

## 2021-01-01 RX ADMIN — FUROSEMIDE 40 MG: 10 INJECTION, SOLUTION INTRAMUSCULAR; INTRAVENOUS at 07:34

## 2021-01-01 RX ADMIN — HYDRALAZINE HYDROCHLORIDE 10 MG: 20 INJECTION INTRAMUSCULAR; INTRAVENOUS at 04:59

## 2021-01-01 RX ADMIN — PROPOFOL 15 MCG/KG/MIN: 10 INJECTION, EMULSION INTRAVENOUS at 17:35

## 2021-01-01 RX ADMIN — IPRATROPIUM BROMIDE AND ALBUTEROL 1 PUFF: 20; 100 SPRAY, METERED RESPIRATORY (INHALATION) at 15:28

## 2021-01-01 RX ADMIN — HEPARIN SODIUM 5000 UNITS: 5000 INJECTION INTRAVENOUS; SUBCUTANEOUS at 14:22

## 2021-01-01 RX ADMIN — PROPOFOL 50 MCG/KG/MIN: 10 INJECTION, EMULSION INTRAVENOUS at 00:54

## 2021-01-01 RX ADMIN — PROPOFOL 30 MCG/KG/MIN: 10 INJECTION, EMULSION INTRAVENOUS at 08:32

## 2021-01-01 RX ADMIN — DEXAMETHASONE SODIUM PHOSPHATE 6 MG: 4 INJECTION, SOLUTION INTRAMUSCULAR; INTRAVENOUS at 09:34

## 2021-01-01 RX ADMIN — PRAVASTATIN SODIUM 40 MG: 40 TABLET ORAL at 20:14

## 2021-01-01 RX ADMIN — REMDESIVIR 100 MG: 100 INJECTION, POWDER, LYOPHILIZED, FOR SOLUTION INTRAVENOUS at 09:54

## 2021-01-01 RX ADMIN — REMDESIVIR 200 MG: 100 INJECTION, POWDER, LYOPHILIZED, FOR SOLUTION INTRAVENOUS at 02:23

## 2021-01-01 RX ADMIN — DOCUSATE SODIUM 50 MG AND SENNOSIDES 8.6 MG 1 TABLET: 8.6; 5 TABLET, FILM COATED ORAL at 11:01

## 2021-01-01 RX ADMIN — PANTOPRAZOLE SODIUM 40 MG: 40 TABLET, DELAYED RELEASE ORAL at 10:09

## 2021-01-01 RX ADMIN — SODIUM CHLORIDE 0.04 MCG/KG/HR: 9 INJECTION, SOLUTION INTRAVENOUS at 12:10

## 2021-01-01 RX ADMIN — AZTREONAM 1000 MG: 1 INJECTION, POWDER, LYOPHILIZED, FOR SOLUTION INTRAMUSCULAR; INTRAVENOUS at 22:00

## 2021-01-01 RX ADMIN — IPRATROPIUM BROMIDE AND ALBUTEROL 1 PUFF: 20; 100 SPRAY, METERED RESPIRATORY (INHALATION) at 16:03

## 2021-01-01 RX ADMIN — SODIUM CHLORIDE 1.4 MCG/KG/HR: 9 INJECTION, SOLUTION INTRAVENOUS at 21:42

## 2021-01-01 RX ADMIN — SODIUM CHLORIDE, PRESERVATIVE FREE 10 ML: 5 INJECTION INTRAVENOUS at 11:02

## 2021-01-01 RX ADMIN — SODIUM CHLORIDE 1.4 MCG/KG/HR: 9 INJECTION, SOLUTION INTRAVENOUS at 03:07

## 2021-01-01 RX ADMIN — IPRATROPIUM BROMIDE AND ALBUTEROL SULFATE 1 AMPULE: .5; 2.5 SOLUTION RESPIRATORY (INHALATION) at 14:10

## 2021-01-01 RX ADMIN — SODIUM CHLORIDE, SODIUM LACTATE, POTASSIUM CHLORIDE, CALCIUM CHLORIDE AND DEXTROSE MONOHYDRATE: 5; 600; 310; 30; 20 INJECTION, SOLUTION INTRAVENOUS at 07:55

## 2021-01-01 RX ADMIN — EPINEPHRINE 30 MCG/MIN: 1 INJECTION INTRAMUSCULAR; INTRAVENOUS; SUBCUTANEOUS at 05:06

## 2021-01-01 RX ADMIN — DOCUSATE SODIUM 50 MG AND SENNOSIDES 8.6 MG 1 TABLET: 8.6; 5 TABLET, FILM COATED ORAL at 20:14

## 2021-01-01 RX ADMIN — SODIUM CHLORIDE, SODIUM LACTATE, POTASSIUM CHLORIDE, CALCIUM CHLORIDE AND DEXTROSE MONOHYDRATE: 5; 600; 310; 30; 20 INJECTION, SOLUTION INTRAVENOUS at 04:25

## 2021-01-01 RX ADMIN — GUAIFENESIN 600 MG: 600 TABLET ORAL at 21:03

## 2021-01-01 RX ADMIN — FENTANYL CITRATE 100 MCG/HR: 0.05 INJECTION, SOLUTION INTRAMUSCULAR; INTRAVENOUS at 11:48

## 2021-01-01 RX ADMIN — SODIUM CHLORIDE 1000 ML: 9 INJECTION, SOLUTION INTRAVENOUS at 14:33

## 2021-01-01 RX ADMIN — FENTANYL CITRATE 200 MCG/HR: 0.05 INJECTION, SOLUTION INTRAMUSCULAR; INTRAVENOUS at 11:49

## 2021-01-01 RX ADMIN — PROPOFOL 20 MCG/KG/MIN: 10 INJECTION, EMULSION INTRAVENOUS at 18:24

## 2021-01-01 RX ADMIN — AZTREONAM 1000 MG: 1 INJECTION, POWDER, LYOPHILIZED, FOR SOLUTION INTRAMUSCULAR; INTRAVENOUS at 17:13

## 2021-01-01 RX ADMIN — HYDRALAZINE HYDROCHLORIDE 20 MG: 20 INJECTION INTRAMUSCULAR; INTRAVENOUS at 01:13

## 2021-01-01 RX ADMIN — CITALOPRAM HYDROBROMIDE 20 MG: 20 TABLET ORAL at 07:49

## 2021-01-01 RX ADMIN — MICONAZOLE NITRATE: 2 POWDER TOPICAL at 09:51

## 2021-01-01 RX ADMIN — DEXAMETHASONE SODIUM PHOSPHATE 6 MG: 4 INJECTION, SOLUTION INTRAMUSCULAR; INTRAVENOUS at 11:04

## 2021-01-01 RX ADMIN — SODIUM BICARBONATE 50 MEQ: 84 INJECTION, SOLUTION INTRAVENOUS at 11:34

## 2021-01-01 RX ADMIN — LACTULOSE 20 G: 20 SOLUTION ORAL at 23:48

## 2021-01-01 RX ADMIN — MICONAZOLE NITRATE: 2 POWDER TOPICAL at 09:39

## 2021-01-01 RX ADMIN — SODIUM CHLORIDE: 9 INJECTION, SOLUTION INTRAVENOUS at 21:06

## 2021-01-01 RX ADMIN — IPRATROPIUM BROMIDE AND ALBUTEROL 1 PUFF: 20; 100 SPRAY, METERED RESPIRATORY (INHALATION) at 20:32

## 2021-01-01 RX ADMIN — PROPOFOL 45 MCG/KG/MIN: 10 INJECTION, EMULSION INTRAVENOUS at 04:05

## 2021-01-01 RX ADMIN — HEPARIN SODIUM 5000 UNITS: 5000 INJECTION INTRAVENOUS; SUBCUTANEOUS at 05:11

## 2021-01-01 RX ADMIN — REMDESIVIR 100 MG: 100 INJECTION, POWDER, LYOPHILIZED, FOR SOLUTION INTRAVENOUS at 09:33

## 2021-01-01 RX ADMIN — TOCILIZUMAB 800 MG: 180 INJECTION, SOLUTION SUBCUTANEOUS at 21:52

## 2021-01-01 RX ADMIN — GUAIFENESIN 600 MG: 600 TABLET ORAL at 09:02

## 2021-01-01 RX ADMIN — HEPARIN SODIUM 5000 UNITS: 5000 INJECTION INTRAVENOUS; SUBCUTANEOUS at 21:02

## 2021-01-01 RX ADMIN — CLINDAMYCIN PHOSPHATE 600 MG: 600 INJECTION, SOLUTION INTRAVENOUS at 17:12

## 2021-01-01 RX ADMIN — PROPOFOL 30 MCG/KG/MIN: 10 INJECTION, EMULSION INTRAVENOUS at 06:38

## 2021-01-01 RX ADMIN — FENTANYL CITRATE 150 MCG/HR: 0.05 INJECTION, SOLUTION INTRAMUSCULAR; INTRAVENOUS at 00:55

## 2021-01-01 RX ADMIN — SODIUM CHLORIDE 0.6 MCG/KG/HR: 9 INJECTION, SOLUTION INTRAVENOUS at 13:23

## 2021-01-01 RX ADMIN — PROPOFOL 40 MCG/KG/MIN: 10 INJECTION, EMULSION INTRAVENOUS at 03:18

## 2021-01-01 RX ADMIN — SODIUM CHLORIDE 1.4 MCG/KG/HR: 9 INJECTION, SOLUTION INTRAVENOUS at 06:38

## 2021-01-01 RX ADMIN — CHLORHEXIDINE GLUCONATE 15 ML: 1.2 RINSE ORAL at 20:14

## 2021-01-01 RX ADMIN — SODIUM CHLORIDE, PRESERVATIVE FREE 10 ML: 5 INJECTION INTRAVENOUS at 07:55

## 2021-01-01 RX ADMIN — CHLORHEXIDINE GLUCONATE 15 ML: 1.2 RINSE ORAL at 08:32

## 2021-01-01 RX ADMIN — CITALOPRAM HYDROBROMIDE 20 MG: 20 TABLET ORAL at 09:33

## 2021-01-01 RX ADMIN — FENTANYL CITRATE 200 MCG/HR: 0.05 INJECTION, SOLUTION INTRAMUSCULAR; INTRAVENOUS at 01:36

## 2021-01-01 RX ADMIN — FENTANYL CITRATE 125 MCG/HR: 0.05 INJECTION, SOLUTION INTRAMUSCULAR; INTRAVENOUS at 17:02

## 2021-01-01 RX ADMIN — AZTREONAM 1000 MG: 1 INJECTION, POWDER, LYOPHILIZED, FOR SOLUTION INTRAMUSCULAR; INTRAVENOUS at 21:30

## 2021-01-01 RX ADMIN — PANTOPRAZOLE SODIUM 40 MG: 40 INJECTION, POWDER, FOR SOLUTION INTRAVENOUS at 09:50

## 2021-01-01 RX ADMIN — HYDRALAZINE HYDROCHLORIDE 20 MG: 20 INJECTION INTRAMUSCULAR; INTRAVENOUS at 20:20

## 2021-01-01 RX ADMIN — SODIUM CHLORIDE 1.4 MCG/KG/HR: 9 INJECTION, SOLUTION INTRAVENOUS at 19:47

## 2021-01-01 RX ADMIN — SODIUM CHLORIDE, SODIUM LACTATE, POTASSIUM CHLORIDE, CALCIUM CHLORIDE AND DEXTROSE MONOHYDRATE: 5; 600; 310; 30; 20 INJECTION, SOLUTION INTRAVENOUS at 04:14

## 2021-01-01 RX ADMIN — SODIUM CHLORIDE 0.5 MCG/KG/HR: 9 INJECTION, SOLUTION INTRAVENOUS at 01:54

## 2021-01-01 RX ADMIN — COLCHICINE 0.6 MG: 0.6 TABLET, FILM COATED ORAL at 07:58

## 2021-01-01 RX ADMIN — PROPOFOL 30 MCG/KG/MIN: 10 INJECTION, EMULSION INTRAVENOUS at 02:07

## 2021-01-01 RX ADMIN — REMDESIVIR 100 MG: 100 INJECTION, POWDER, LYOPHILIZED, FOR SOLUTION INTRAVENOUS at 11:30

## 2021-01-01 RX ADMIN — SODIUM CHLORIDE 1.4 MCG/KG/HR: 9 INJECTION, SOLUTION INTRAVENOUS at 09:44

## 2021-01-01 RX ADMIN — AZTREONAM 1000 MG: 1 INJECTION, POWDER, LYOPHILIZED, FOR SOLUTION INTRAMUSCULAR; INTRAVENOUS at 06:05

## 2021-01-01 RX ADMIN — CITALOPRAM HYDROBROMIDE 20 MG: 20 TABLET ORAL at 08:31

## 2021-01-01 RX ADMIN — FLUTICASONE PROPIONATE 2 SPRAY: 50 SPRAY, METERED NASAL at 09:03

## 2021-01-01 RX ADMIN — PROPOFOL 40 MCG/KG/MIN: 10 INJECTION, EMULSION INTRAVENOUS at 06:40

## 2021-01-01 RX ADMIN — LACTULOSE 20 G: 20 SOLUTION ORAL at 05:51

## 2021-01-01 RX ADMIN — CHLORHEXIDINE GLUCONATE 15 ML: 1.2 RINSE ORAL at 21:30

## 2021-01-01 RX ADMIN — FLUTICASONE PROPIONATE 2 SPRAY: 50 SPRAY, METERED NASAL at 07:49

## 2021-01-01 RX ADMIN — DOCUSATE SODIUM 50 MG AND SENNOSIDES 8.6 MG 1 TABLET: 8.6; 5 TABLET, FILM COATED ORAL at 09:33

## 2021-01-01 RX ADMIN — VASOPRESSIN 0.04 UNITS/MIN: 20 INJECTION INTRAVENOUS at 20:23

## 2021-01-01 RX ADMIN — CALCIUM GLUCONATE 1000 MG: 98 INJECTION, SOLUTION INTRAVENOUS at 09:30

## 2021-01-01 RX ADMIN — SODIUM CHLORIDE 1.4 MCG/KG/HR: 9 INJECTION, SOLUTION INTRAVENOUS at 16:28

## 2021-01-01 RX ADMIN — CARVEDILOL 12.5 MG: 12.5 TABLET, FILM COATED ORAL at 07:40

## 2021-01-01 RX ADMIN — CITALOPRAM HYDROBROMIDE 20 MG: 20 TABLET ORAL at 08:23

## 2021-01-01 RX ADMIN — LABETALOL HYDROCHLORIDE 20 MG: 5 INJECTION, SOLUTION INTRAVENOUS at 11:10

## 2021-01-01 RX ADMIN — MICONAZOLE NITRATE: 2 POWDER TOPICAL at 21:02

## 2021-01-01 RX ADMIN — SODIUM CHLORIDE, SODIUM LACTATE, POTASSIUM CHLORIDE, CALCIUM CHLORIDE AND DEXTROSE MONOHYDRATE: 5; 600; 310; 30; 20 INJECTION, SOLUTION INTRAVENOUS at 22:50

## 2021-01-01 RX ADMIN — ALBUTEROL SULFATE 2.5 MG: 2.5 SOLUTION RESPIRATORY (INHALATION) at 14:33

## 2021-01-01 RX ADMIN — SODIUM CHLORIDE: 900 INJECTION, SOLUTION INTRAVENOUS at 18:12

## 2021-01-01 RX ADMIN — PROPOFOL 45 MCG/KG/MIN: 10 INJECTION, EMULSION INTRAVENOUS at 05:48

## 2021-01-01 RX ADMIN — ASPIRIN 81 MG: 81 TABLET, CHEWABLE ORAL at 11:01

## 2021-01-01 RX ADMIN — PANTOPRAZOLE SODIUM 40 MG: 40 INJECTION, POWDER, FOR SOLUTION INTRAVENOUS at 10:53

## 2021-01-01 RX ADMIN — DEXAMETHASONE SODIUM PHOSPHATE 6 MG: 4 INJECTION, SOLUTION INTRAMUSCULAR; INTRAVENOUS at 08:31

## 2021-01-01 RX ADMIN — AZTREONAM 1000 MG: 1 INJECTION, POWDER, LYOPHILIZED, FOR SOLUTION INTRAMUSCULAR; INTRAVENOUS at 05:30

## 2021-01-01 RX ADMIN — Medication 2 MCG/MIN: at 12:39

## 2021-01-01 RX ADMIN — Medication 3 ML: at 13:48

## 2021-01-01 RX ADMIN — HYDRALAZINE HYDROCHLORIDE 20 MG: 20 INJECTION INTRAMUSCULAR; INTRAVENOUS at 01:30

## 2021-01-01 RX ADMIN — SODIUM CHLORIDE 1.4 MCG/KG/HR: 9 INJECTION, SOLUTION INTRAVENOUS at 17:19

## 2021-01-01 RX ADMIN — SODIUM CHLORIDE 0.7 MCG/KG/HR: 9 INJECTION, SOLUTION INTRAVENOUS at 18:31

## 2021-01-01 RX ADMIN — PROPOFOL 50 MCG/KG/MIN: 10 INJECTION, EMULSION INTRAVENOUS at 16:42

## 2021-01-01 RX ADMIN — PROPOFOL 20.02 MCG/KG/MIN: 10 INJECTION, EMULSION INTRAVENOUS at 22:51

## 2021-01-01 RX ADMIN — PRAVASTATIN SODIUM 40 MG: 40 TABLET ORAL at 21:20

## 2021-01-01 RX ADMIN — POTASSIUM BICARBONATE 40 MEQ: 782 TABLET, EFFERVESCENT ORAL at 10:59

## 2021-01-01 RX ADMIN — SODIUM BICARBONATE 25 MEQ: 84 INJECTION, SOLUTION INTRAVENOUS at 03:31

## 2021-01-01 RX ADMIN — DEXAMETHASONE SODIUM PHOSPHATE 6 MG: 4 INJECTION, SOLUTION INTRAMUSCULAR; INTRAVENOUS at 07:50

## 2021-01-01 RX ADMIN — FENTANYL CITRATE 100 MCG/HR: 0.05 INJECTION, SOLUTION INTRAMUSCULAR; INTRAVENOUS at 07:56

## 2021-01-01 RX ADMIN — MAGNESIUM SULFATE HEPTAHYDRATE 2000 MG: 40 INJECTION, SOLUTION INTRAVENOUS at 14:33

## 2021-01-01 RX ADMIN — SODIUM CHLORIDE: 9 INJECTION, SOLUTION INTRAVENOUS at 09:41

## 2021-01-01 RX ADMIN — SODIUM CHLORIDE 0.9 MCG/KG/HR: 9 INJECTION, SOLUTION INTRAVENOUS at 15:42

## 2021-01-01 RX ADMIN — HYDRALAZINE HYDROCHLORIDE 10 MG: 20 INJECTION INTRAMUSCULAR; INTRAVENOUS at 13:04

## 2021-01-01 RX ADMIN — Medication 3 ML: at 18:13

## 2021-01-01 RX ADMIN — LABETALOL HYDROCHLORIDE 20 MG: 5 INJECTION, SOLUTION INTRAVENOUS at 03:07

## 2021-01-01 RX ADMIN — MICONAZOLE NITRATE: 2 POWDER TOPICAL at 20:01

## 2021-01-01 RX ADMIN — MORPHINE SULFATE 2 MG: 2 INJECTION, SOLUTION INTRAMUSCULAR; INTRAVENOUS at 13:05

## 2021-01-01 RX ADMIN — SODIUM CHLORIDE 1.4 MCG/KG/HR: 9 INJECTION, SOLUTION INTRAVENOUS at 02:21

## 2021-01-01 RX ADMIN — FLUDROCORTISONE ACETATE 0.1 MG: 0.1 TABLET ORAL at 03:51

## 2021-01-01 RX ADMIN — VALSARTAN 320 MG: 160 TABLET, FILM COATED ORAL at 09:02

## 2021-01-01 RX ADMIN — CITALOPRAM HYDROBROMIDE 20 MG: 20 TABLET ORAL at 11:01

## 2021-01-01 RX ADMIN — SODIUM CHLORIDE, PRESERVATIVE FREE 10 ML: 5 INJECTION INTRAVENOUS at 21:21

## 2021-01-01 RX ADMIN — HYDRALAZINE HYDROCHLORIDE 20 MG: 20 INJECTION INTRAMUSCULAR; INTRAVENOUS at 19:55

## 2021-01-01 RX ADMIN — VALSARTAN 320 MG: 160 TABLET, FILM COATED ORAL at 07:58

## 2021-01-01 RX ADMIN — HYDRALAZINE HYDROCHLORIDE 20 MG: 20 INJECTION INTRAMUSCULAR; INTRAVENOUS at 13:56

## 2021-01-01 RX ADMIN — MORPHINE SULFATE 2 MG: 2 INJECTION, SOLUTION INTRAMUSCULAR; INTRAVENOUS at 22:30

## 2021-01-01 RX ADMIN — HYDRALAZINE HYDROCHLORIDE 10 MG: 20 INJECTION INTRAMUSCULAR; INTRAVENOUS at 09:29

## 2021-01-01 RX ADMIN — CITALOPRAM HYDROBROMIDE 20 MG: 20 TABLET ORAL at 10:09

## 2021-01-01 RX ADMIN — METHYLPREDNISOLONE SODIUM SUCCINATE 125 MG: 125 INJECTION, POWDER, FOR SOLUTION INTRAMUSCULAR; INTRAVENOUS at 14:33

## 2021-01-01 RX ADMIN — SODIUM CHLORIDE 0.9 MCG/KG/HR: 9 INJECTION, SOLUTION INTRAVENOUS at 01:34

## 2021-01-01 RX ADMIN — HYDRALAZINE HYDROCHLORIDE 10 MG: 20 INJECTION INTRAMUSCULAR; INTRAVENOUS at 12:18

## 2021-01-01 RX ADMIN — DEXAMETHASONE SODIUM PHOSPHATE 10 MG: 10 INJECTION INTRAMUSCULAR; INTRAVENOUS at 20:55

## 2021-01-01 RX ADMIN — SODIUM BICARBONATE 50 MEQ: 84 INJECTION, SOLUTION INTRAVENOUS at 09:27

## 2021-01-01 RX ADMIN — PROPOFOL 13 MCG/KG/MIN: 10 INJECTION, EMULSION INTRAVENOUS at 07:45

## 2021-01-01 RX ADMIN — AZTREONAM 1000 MG: 1 INJECTION, POWDER, LYOPHILIZED, FOR SOLUTION INTRAMUSCULAR; INTRAVENOUS at 21:36

## 2021-01-01 RX ADMIN — SODIUM CHLORIDE 0.02 MCG/KG/HR: 9 INJECTION, SOLUTION INTRAVENOUS at 23:54

## 2021-01-01 RX ADMIN — REMDESIVIR 100 MG: 100 INJECTION, POWDER, LYOPHILIZED, FOR SOLUTION INTRAVENOUS at 10:51

## 2021-01-01 RX ADMIN — SODIUM CHLORIDE 1.4 MCG/KG/HR: 9 INJECTION, SOLUTION INTRAVENOUS at 04:46

## 2021-01-01 RX ADMIN — CHLORHEXIDINE GLUCONATE 15 ML: 1.2 RINSE ORAL at 09:33

## 2021-01-01 RX ADMIN — Medication 10 MCG/MIN: at 15:11

## 2021-01-01 RX ADMIN — METOPROLOL TARTRATE 5 MG: 5 INJECTION INTRAVENOUS at 15:14

## 2021-01-01 RX ADMIN — IPRATROPIUM BROMIDE AND ALBUTEROL 1 PUFF: 20; 100 SPRAY, METERED RESPIRATORY (INHALATION) at 11:21

## 2021-01-01 RX ADMIN — FLUCONAZOLE IN SODIUM CHLORIDE 100 MG: 2 INJECTION, SOLUTION INTRAVENOUS at 23:30

## 2021-01-01 RX ADMIN — PANTOPRAZOLE SODIUM 40 MG: 40 INJECTION, POWDER, FOR SOLUTION INTRAVENOUS at 09:33

## 2021-01-01 RX ADMIN — PROPOFOL 30 MCG/KG/MIN: 10 INJECTION, EMULSION INTRAVENOUS at 15:19

## 2021-01-01 RX ADMIN — ALBUMIN (HUMAN) 25 G: 0.25 INJECTION, SOLUTION INTRAVENOUS at 03:34

## 2021-01-01 RX ADMIN — SODIUM CHLORIDE 0.5 MCG/KG/HR: 9 INJECTION, SOLUTION INTRAVENOUS at 12:29

## 2021-01-01 RX ADMIN — SODIUM CHLORIDE 0.5 MCG/KG/HR: 9 INJECTION, SOLUTION INTRAVENOUS at 06:05

## 2021-01-01 RX ADMIN — METOPROLOL TARTRATE 5 MG: 5 INJECTION INTRAVENOUS at 20:00

## 2021-01-01 RX ADMIN — LABETALOL HYDROCHLORIDE 10 MG: 5 INJECTION, SOLUTION INTRAVENOUS at 03:19

## 2021-01-01 RX ADMIN — CHLORHEXIDINE GLUCONATE 15 ML: 1.2 RINSE ORAL at 08:12

## 2021-01-01 RX ADMIN — SODIUM CHLORIDE 0.9 MCG/KG/HR: 9 INJECTION, SOLUTION INTRAVENOUS at 18:47

## 2021-01-01 RX ADMIN — PRAVASTATIN SODIUM 40 MG: 40 TABLET ORAL at 21:01

## 2021-01-01 ASSESSMENT — PULMONARY FUNCTION TESTS
PIF_VALUE: 28
PIF_VALUE: 37
PIF_VALUE: 36
PIF_VALUE: 13
PIF_VALUE: 29
PIF_VALUE: 32
PIF_VALUE: 35
PIF_VALUE: 35
PIF_VALUE: 29
PIF_VALUE: 33
PIF_VALUE: 38
PIF_VALUE: 42
PIF_VALUE: 16
PIF_VALUE: 37
PIF_VALUE: 27
PIF_VALUE: 32
PIF_VALUE: 35
PIF_VALUE: 31
PIF_VALUE: 39
PIF_VALUE: 32
PIF_VALUE: 32
PIF_VALUE: 12
PIF_VALUE: 46
PIF_VALUE: 29
PIF_VALUE: 38
PIF_VALUE: 31
PIF_VALUE: 38
PIF_VALUE: 30
PIF_VALUE: 18
PIF_VALUE: 40
PIF_VALUE: 30
PIF_VALUE: 28
PIF_VALUE: 28
PIF_VALUE: 32
PIF_VALUE: 29
PIF_VALUE: 33
PIF_VALUE: 42
PIF_VALUE: 34
PIF_VALUE: 19
PIF_VALUE: 35
PIF_VALUE: 30
PIF_VALUE: 17
PIF_VALUE: 33
PIF_VALUE: 38
PIF_VALUE: 44
PIF_VALUE: 48
PIF_VALUE: 43
PIF_VALUE: 32
PIF_VALUE: 33
PIF_VALUE: 29
PIF_VALUE: 32
PIF_VALUE: 32
PIF_VALUE: 38
PIF_VALUE: 31
PIF_VALUE: 33
PIF_VALUE: 32
PIF_VALUE: 28
PIF_VALUE: 14
PIF_VALUE: 29
PIF_VALUE: 33
PIF_VALUE: 43
PIF_VALUE: 33
PIF_VALUE: 33
PIF_VALUE: 31
PIF_VALUE: 46
PIF_VALUE: 27
PIF_VALUE: 34
PIF_VALUE: 13
PIF_VALUE: 29
PIF_VALUE: 30
PIF_VALUE: 59
PIF_VALUE: 35
PIF_VALUE: 28
PIF_VALUE: 15
PIF_VALUE: 29
PIF_VALUE: 15
PIF_VALUE: 23
PIF_VALUE: 36
PIF_VALUE: 28
PIF_VALUE: 17
PIF_VALUE: 28
PIF_VALUE: 41
PIF_VALUE: 35
PIF_VALUE: 29
PIF_VALUE: 30
PIF_VALUE: 32
PIF_VALUE: 28
PIF_VALUE: 14
PIF_VALUE: 29
PIF_VALUE: 33
PIF_VALUE: 13
PIF_VALUE: 34
PIF_VALUE: 26
PIF_VALUE: 34
PIF_VALUE: 28
PIF_VALUE: 32
PIF_VALUE: 16
PIF_VALUE: 32
PIF_VALUE: 33
PIF_VALUE: 15
PIF_VALUE: 30
PIF_VALUE: 33
PIF_VALUE: 39
PIF_VALUE: 26
PIF_VALUE: 41
PIF_VALUE: 31
PIF_VALUE: 35
PIF_VALUE: 36
PIF_VALUE: 47
PIF_VALUE: 33
PIF_VALUE: 13
PIF_VALUE: 32
PIF_VALUE: 33
PIF_VALUE: 31
PIF_VALUE: 35
PIF_VALUE: 39
PIF_VALUE: 36
PIF_VALUE: 35
PIF_VALUE: 26
PIF_VALUE: 29
PIF_VALUE: 31
PIF_VALUE: 12
PIF_VALUE: 30
PIF_VALUE: 36
PIF_VALUE: 34
PIF_VALUE: 17
PIF_VALUE: 29
PIF_VALUE: 30
PIF_VALUE: 29
PIF_VALUE: 26
PIF_VALUE: 27
PIF_VALUE: 29
PIF_VALUE: 28
PIF_VALUE: 32
PIF_VALUE: 35
PIF_VALUE: 40
PIF_VALUE: 39
PIF_VALUE: 29
PIF_VALUE: 19
PIF_VALUE: 38
PIF_VALUE: 35
PIF_VALUE: 44
PIF_VALUE: 28
PIF_VALUE: 32
PIF_VALUE: 31
PIF_VALUE: 32
PIF_VALUE: 33
PIF_VALUE: 28
PIF_VALUE: 32
PIF_VALUE: 30
PIF_VALUE: 14
PIF_VALUE: 31
PIF_VALUE: 15
PIF_VALUE: 30
PIF_VALUE: 31
PIF_VALUE: 31
PIF_VALUE: 45
PIF_VALUE: 34
PIF_VALUE: 30
PIF_VALUE: 28
PIF_VALUE: 31
PIF_VALUE: 42
PIF_VALUE: 35
PIF_VALUE: 35
PIF_VALUE: 36
PIF_VALUE: 20
PIF_VALUE: 28
PIF_VALUE: 32
PIF_VALUE: 49
PIF_VALUE: 36
PIF_VALUE: 37
PIF_VALUE: 26
PIF_VALUE: 31
PIF_VALUE: 32
PIF_VALUE: 29
PIF_VALUE: 28
PIF_VALUE: 47
PIF_VALUE: 28
PIF_VALUE: 32
PIF_VALUE: 24
PIF_VALUE: 34
PIF_VALUE: 40
PIF_VALUE: 35
PIF_VALUE: 32
PIF_VALUE: 35
PIF_VALUE: 35
PIF_VALUE: 17
PIF_VALUE: 29
PIF_VALUE: 36
PIF_VALUE: 32
PIF_VALUE: 26
PIF_VALUE: 32
PIF_VALUE: 30
PIF_VALUE: 30
PIF_VALUE: 28
PIF_VALUE: 30
PIF_VALUE: 33
PIF_VALUE: 32
PIF_VALUE: 16
PIF_VALUE: 41
PIF_VALUE: 29
PIF_VALUE: 22
PIF_VALUE: 26
PIF_VALUE: 40
PIF_VALUE: 33
PIF_VALUE: 33
PIF_VALUE: 42
PIF_VALUE: 30
PIF_VALUE: 31
PIF_VALUE: 31
PIF_VALUE: 19
PIF_VALUE: 29
PIF_VALUE: 28
PIF_VALUE: 36
PIF_VALUE: 28
PIF_VALUE: 14
PIF_VALUE: 30
PIF_VALUE: 32
PIF_VALUE: 14
PIF_VALUE: 30
PIF_VALUE: 20
PIF_VALUE: 15
PIF_VALUE: 33
PIF_VALUE: 40
PIF_VALUE: 38
PIF_VALUE: 30
PIF_VALUE: 31
PIF_VALUE: 33
PIF_VALUE: 35
PIF_VALUE: 48
PIF_VALUE: 36
PIF_VALUE: 32
PIF_VALUE: 34
PIF_VALUE: 33
PIF_VALUE: 32
PIF_VALUE: 33
PIF_VALUE: 42
PIF_VALUE: 18
PIF_VALUE: 31
PIF_VALUE: 35
PIF_VALUE: 34
PIF_VALUE: 17
PIF_VALUE: 37
PIF_VALUE: 49
PIF_VALUE: 34
PIF_VALUE: 30
PIF_VALUE: 32
PIF_VALUE: 30
PIF_VALUE: 34
PIF_VALUE: 46
PIF_VALUE: 30
PIF_VALUE: 34
PIF_VALUE: 41
PIF_VALUE: 31
PIF_VALUE: 32
PIF_VALUE: 16
PIF_VALUE: 35
PIF_VALUE: 33
PIF_VALUE: 26
PIF_VALUE: 28
PIF_VALUE: 43
PIF_VALUE: 48
PIF_VALUE: 35
PIF_VALUE: 37
PIF_VALUE: 29
PIF_VALUE: 29
PIF_VALUE: 40
PIF_VALUE: 36
PIF_VALUE: 13
PIF_VALUE: 35
PIF_VALUE: 44
PIF_VALUE: 32
PIF_VALUE: 30
PIF_VALUE: 51
PIF_VALUE: 41
PIF_VALUE: 32
PIF_VALUE: 33
PIF_VALUE: 28
PIF_VALUE: 36
PIF_VALUE: 32
PIF_VALUE: 38
PIF_VALUE: 29
PIF_VALUE: 44
PIF_VALUE: 38
PIF_VALUE: 26
PIF_VALUE: 38
PIF_VALUE: 14
PIF_VALUE: 40
PIF_VALUE: 34
PIF_VALUE: 32
PIF_VALUE: 37
PIF_VALUE: 14
PIF_VALUE: 36
PIF_VALUE: 39
PIF_VALUE: 46
PIF_VALUE: 32
PIF_VALUE: 31
PIF_VALUE: 35
PIF_VALUE: 32
PIF_VALUE: 13
PIF_VALUE: 39
PIF_VALUE: 31
PIF_VALUE: 43
PIF_VALUE: 37
PIF_VALUE: 19
PIF_VALUE: 13
PIF_VALUE: 30
PIF_VALUE: 33
PIF_VALUE: 31
PIF_VALUE: 31
PIF_VALUE: 27
PIF_VALUE: 29
PIF_VALUE: 34
PIF_VALUE: 41
PIF_VALUE: 30
PIF_VALUE: 33
PIF_VALUE: 47
PIF_VALUE: 34
PIF_VALUE: 32
PIF_VALUE: 11
PIF_VALUE: 31
PIF_VALUE: 36
PIF_VALUE: 33
PIF_VALUE: 36
PIF_VALUE: 26
PIF_VALUE: 34
PIF_VALUE: 31
PIF_VALUE: 32
PIF_VALUE: 35
PIF_VALUE: 16
PIF_VALUE: 32
PIF_VALUE: 31
PIF_VALUE: 19
PIF_VALUE: 29
PIF_VALUE: 37
PIF_VALUE: 46
PIF_VALUE: 32
PIF_VALUE: 14
PIF_VALUE: 35
PIF_VALUE: 30
PIF_VALUE: 32
PIF_VALUE: 31
PIF_VALUE: 29
PIF_VALUE: 30
PIF_VALUE: 28
PIF_VALUE: 29
PIF_VALUE: 38
PIF_VALUE: 14
PIF_VALUE: 29
PIF_VALUE: 17
PIF_VALUE: 17
PIF_VALUE: 33
PIF_VALUE: 34
PIF_VALUE: 32
PIF_VALUE: 35
PIF_VALUE: 14
PIF_VALUE: 32
PIF_VALUE: 27
PIF_VALUE: 33
PIF_VALUE: 34
PIF_VALUE: 32
PIF_VALUE: 44
PIF_VALUE: 34
PIF_VALUE: 35
PIF_VALUE: 42
PIF_VALUE: 15
PIF_VALUE: 40
PIF_VALUE: 34
PIF_VALUE: 29
PIF_VALUE: 21
PIF_VALUE: 17
PIF_VALUE: 37
PIF_VALUE: 30
PIF_VALUE: 30
PIF_VALUE: 31
PIF_VALUE: 45
PIF_VALUE: 48
PIF_VALUE: 34
PIF_VALUE: 31
PIF_VALUE: 31
PIF_VALUE: 29
PIF_VALUE: 28
PIF_VALUE: 45
PIF_VALUE: 32
PIF_VALUE: 24
PIF_VALUE: 39
PIF_VALUE: 45
PIF_VALUE: 30
PIF_VALUE: 27
PIF_VALUE: 32
PIF_VALUE: 31
PIF_VALUE: 28
PIF_VALUE: 35

## 2021-01-01 ASSESSMENT — ENCOUNTER SYMPTOMS
CONSTIPATION: 0
BLOOD IN STOOL: 0
NAUSEA: 0
ANAL BLEEDING: 0
SORE THROAT: 0
SPUTUM PRODUCTION: 0
COLOR CHANGE: 0
BLOOD IN STOOL: 0
VOMITING: 0
VOICE CHANGE: 0
RECTAL PAIN: 0
CHEST TIGHTNESS: 0
STRIDOR: 0
ANAL BLEEDING: 0
EYE ITCHING: 0
STRIDOR: 0
WHEEZING: 0
BACK PAIN: 0
SHORTNESS OF BREATH: 1
COLOR CHANGE: 0
BACK PAIN: 0
COUGH: 0
ABDOMINAL DISTENTION: 0
CHEST TIGHTNESS: 0
CHEST TIGHTNESS: 0
VOICE CHANGE: 0
BLOOD IN STOOL: 0
BACK PAIN: 0
SHORTNESS OF BREATH: 0
RHINORRHEA: 0
SHORTNESS OF BREATH: 1
EYE DISCHARGE: 0
DIARRHEA: 0
EYE DISCHARGE: 0
RECTAL PAIN: 0
CHOKING: 0
STRIDOR: 0
COLOR CHANGE: 0
VOICE CHANGE: 0
CHEST TIGHTNESS: 0
CHOKING: 0
SORE THROAT: 0
CHEST TIGHTNESS: 0
STRIDOR: 0
VOICE CHANGE: 0
SORE THROAT: 0
APNEA: 0
SORE THROAT: 0
CONSTIPATION: 0
TROUBLE SWALLOWING: 0
VOMITING: 0
NAUSEA: 0
SORE THROAT: 0
APNEA: 0
CHOKING: 0
ABDOMINAL PAIN: 0
CHOKING: 0
WHEEZING: 0
EYE ITCHING: 0
NAUSEA: 0
CONSTIPATION: 0
ANAL BLEEDING: 0
EYE PAIN: 0
DIARRHEA: 0
EYE ITCHING: 0
BACK PAIN: 0
EYE DISCHARGE: 0
APNEA: 0
COLOR CHANGE: 0
DIARRHEA: 0
TROUBLE SWALLOWING: 0
VOMITING: 0
CHOKING: 0
VOMITING: 0
TROUBLE SWALLOWING: 0
CONSTIPATION: 0
WHEEZING: 0
COUGH: 0
WHEEZING: 0
SORE THROAT: 0
WHEEZING: 0
EYE ITCHING: 0
CHOKING: 0
WHEEZING: 1
VOMITING: 0
BACK PAIN: 0
EYE ITCHING: 0
SHORTNESS OF BREATH: 1
SHORTNESS OF BREATH: 1
COLOR CHANGE: 0
SHORTNESS OF BREATH: 1
SHORTNESS OF BREATH: 1
VOMITING: 0
CHEST TIGHTNESS: 0
BACK PAIN: 0
TROUBLE SWALLOWING: 0
ABDOMINAL PAIN: 0
ABDOMINAL DISTENTION: 0
DIARRHEA: 0
ABDOMINAL PAIN: 0
NAUSEA: 0
RECTAL PAIN: 0
COUGH: 0
EYE DISCHARGE: 0
TROUBLE SWALLOWING: 0
COUGH: 1
SORE THROAT: 0
EYE DISCHARGE: 0
ABDOMINAL DISTENTION: 0
COUGH: 1
CHEST TIGHTNESS: 0
APNEA: 0
DIARRHEA: 0
VOMITING: 0
SINUS PRESSURE: 0
ABDOMINAL DISTENTION: 0
VOICE CHANGE: 0
NAUSEA: 0
ABDOMINAL PAIN: 0
ABDOMINAL PAIN: 0
CONSTIPATION: 0
BLOOD IN STOOL: 0
TROUBLE SWALLOWING: 0
RECTAL PAIN: 0
STRIDOR: 0
ANAL BLEEDING: 0
RECTAL PAIN: 0
VOICE CHANGE: 0
ABDOMINAL PAIN: 0
DIARRHEA: 0
COUGH: 0
ABDOMINAL DISTENTION: 0
STRIDOR: 0
SHORTNESS OF BREATH: 0
WHEEZING: 0
CONSTIPATION: 0
SORE THROAT: 0
ANAL BLEEDING: 0
BLOOD IN STOOL: 0
APNEA: 0
EYE REDNESS: 0
COUGH: 1
EYE DISCHARGE: 0
BACK PAIN: 0
COUGH: 1
FACIAL SWELLING: 0
NAUSEA: 0
BLOOD IN STOOL: 0
DIARRHEA: 0
ABDOMINAL PAIN: 0
SWOLLEN GLANDS: 0

## 2021-01-01 ASSESSMENT — PAIN SCALES - GENERAL
PAINLEVEL_OUTOF10: 0
PAINLEVEL_OUTOF10: 6
PAINLEVEL_OUTOF10: 4
PAINLEVEL_OUTOF10: 0
PAINLEVEL_OUTOF10: 2
PAINLEVEL_OUTOF10: 0
PAINLEVEL_OUTOF10: 4
PAINLEVEL_OUTOF10: 0
PAINLEVEL_OUTOF10: 2
PAINLEVEL_OUTOF10: 0

## 2021-07-24 PROBLEM — J45.40 MODERATE PERSISTENT ASTHMA, UNCOMPLICATED: Status: ACTIVE | Noted: 2017-06-28

## 2021-07-24 NOTE — ED PROVIDER NOTES
[address] ED  eMERGENCYdEPARTMENT eNCOUnter      Pt Name: Rai Ramirez  MRN: [id number]  [place]az 1945  Date of evaluation: 7/24/2021  Sarah Kaur MD    CHIEF COMPLAINT           HPI  Rai Ramirez is a 68 y.o. female per chart review has a h/o CKD, HTN, Hpl, asthma, OA, GERD presents to the ED with sob, cough. Pt notes gradual onset, moderate, constant, sob x 3 days. +Cough. Pt denies fever, n/v, cp, ab pain, dysuria, diarrhea. Pt states it feels like her asthma. ROS  Review of Systems   Constitutional: Negative for activity change, chills and fever. HENT: Negative for ear pain and sore throat. Eyes: Negative for visual disturbance. Respiratory: Positive for cough and shortness of breath. Cardiovascular: Negative for chest pain, palpitations and leg swelling. Gastrointestinal: Negative for abdominal pain, diarrhea, nausea and vomiting. Genitourinary: Negative for dysuria. Musculoskeletal: Negative for back pain. Skin: Negative for rash. Neurological: Negative for dizziness and weakness. Except as noted above the remainder of the review of systems was reviewed and negative.        PAST MEDICAL HISTORY     Past Medical History:   Diagnosis Date    Allergic rhinitis 9/18/2013    Anxiety 9/18/2013    Asthma     Carpal tunnel syndrome 5/19/2015    Cellulitis of toe, right 6/5/2012    Cholelithiasis, common bile duct 1/10/2012    CKD (chronic kidney disease) stage 3, GFR 30-59 ml/min ([facility]) 9/18/2013    Depression     Dyslipidemia 11/26/2011    GERD (gastroesophageal reflux disease)     Gout 10/24/2011    HTN (hypertension) 10/24/2011    Hypertension     Irritable bowel syndrome 11/26/2011    Microscopic hematuria 12/13/2012    Midsternal chest pain     Migraine headache 2/22/2012    Multiple-type hyperlipidemia 5/19/2015    Muscle spasm 10/24/2011    Obesity, Class III, BMI 40-49.9 (morbid obesity) ([address] 11/26/2011    JARON (obstructive sleep apnea) 9/18/2013    Osteoarthritis     Other activity(E029.9)     sensative to vicryl sutures    Palpitations     Polyarthralgia 10/24/2011    Screening mammogram     Skin cancer 10/10/2016    Splenic mass 12/13/2012         SURGICAL HISTORY       Past Surgical History:   Procedure Laterality Date    BUNIONECTOMY      removal    CARDIOVASCULAR STRESS TEST      2011 normal    CHOLECYSTECTOMY      HYSTERECTOMY      SINUS SURGERY      TONSILLECTOMY           CURRENTMEDICATIONS       Previous Medications    AMLODIPINE (NORVASC) 10 MG TABLET    Take 1 tablet by mouth daily    ASCORBIC ACID (VITAMIN C) 500 MG CAPS    Take 1 tablet by mouth 2 times daily     ASPIRIN 81 MG TABLET    Take 81 mg by mouth daily. BISACODYL (DULCOLAX) 5 MG EC TABLET    Use as directed for Miralax / Gatorade Bowel Prep Kit    CARVEDILOL (COREG) 25 MG TABLET    Take 0.5 tablets by mouth 2 times daily (with meals) At night    CHOLECALCIFEROL 50 MCG ([address]) TABS    Take 2,000 Units by mouth daily    CITALOPRAM (CELEXA) 20 MG TABLET    TAKE 1 TABLET BY MOUTH DAILY    COLCHICINE (COLCRYS) 0.6 MG TABLET    Take 0.6 mg by mouth every other day     FEBUXOSTAT 40 MG TABS TABLET    Take 40 mg by mouth daily    FLUTICASONE (FLONASE) 50 MCG/ACT NASAL SPRAY    use 2 (TWO) sprays nasally daily AS DIRECTED. FLUTICASONE-SALMETEROL (ADVAIR DISKUS) 500-50 MCG/DOSE DISKUS INHALER    Inhale 1 puff into the lungs 2 times daily    LEVOCETIRIZINE (XYZAL) 5 MG TABLET    Take 1 tablet by mouth once daily. LORAZEPAM (ATIVAN) 1 MG TABLET    Take 1 tablet by mouth every 6 hours as needed. LOSARTAN (COZAAR) 100 MG TABLET    Take 1 tablet by mouth once daily. LOVASTATIN (MEVACOR) 40 MG TABLET    Take 1 tablet by mouth nightly    MAGNESIUM OXIDE (MAGNESIUM-OXIDE) 250 MG TABS TABLET    Take 1 tablet by mouth daily    MULTIPLE VITAMINS-MINERALS (SENIOR MULTIVITAMIN PLUS) TABS    Take 1 tablet by mouth daily.     NEXIUM 40 MG Out of Food in the Last Year:    Transportation Needs:     Lack of Transportation (Medical):  Lack of Transportation (Non-Medical):    Physical Activity:     Days of Exercise per Week:     Minutes of Exercise per Session:    Stress:     Feeling of Stress :    Social Connections:     Frequency of Communication with Friends and Family:     Frequency of Social Gatherings with Friends and Family:     Attends [employer] Services:     Active Member of Clubs or Organizations:     Attends Club or Organization Meetings:     Marital Status:    Intimate Partner Violence:     Fear of Current or Ex-Partner:     Emotionally Abused:     Physically Abused:     Sexually Abused:          PHYSICAL EXAM       ED Triage Vitals [07/24/21 1425]   BP Temp Temp Source Pulse Resp SpO2 Height Weight   -- 99 °F (37.2 °C) Oral 83 18 94 % 5' 5\" (1.651 m) 289 lb (131.1 kg)       Physical Exam  Vitals and nursing note reviewed. Constitutional:       Appearance: She is well-developed. HENT:      Head: Normocephalic. Right Ear: External ear normal.      Left Ear: External ear normal.   Eyes:      Conjunctiva/sclera: Conjunctivae normal.      Pupils: Pupils are equal, round, and reactive to light. Cardiovascular:      Rate and Rhythm: Normal rate and regular rhythm. Heart sounds: Normal heart sounds. Pulmonary:      Effort: Pulmonary effort is normal.      Breath sounds: Examination of the right-lower field reveals wheezing. Examination of the left-lower field reveals wheezing. Wheezing present. Abdominal:      General: Bowel sounds are normal. There is no distension. Palpations: Abdomen is soft. Tenderness: There is no abdominal tenderness. Musculoskeletal:         General: Normal range of motion. Cervical back: Normal range of motion and neck supple. Skin:     General: Skin is warm and dry. Neurological:      Mental Status: She is alert and oriented to person, place, and time.

## 2021-07-24 NOTE — PATIENT INSTRUCTIONS
Patient Education        Shortness of Breath: Care Instructions  Your Care Instructions     Shortness of breath has many causes. Sometimes conditions such as anxiety can lead to shortness of breath. Some people get mild shortness of breath when they exercise. Trouble breathing also can be a symptom of a serious problem, such as asthma, lung disease, emphysema, heart problems, and pneumonia. If your shortness of breath continues, you may need tests and treatment. Watch for any changes in your breathing and other symptoms. Follow-up care is a key part of your treatment and safety. Be sure to make and go to all appointments, and call your doctor if you are having problems. It's also a good idea to know your test results and keep a list of the medicines you take. How can you care for yourself at home? · Do not smoke or allow others to smoke around you. If you need help quitting, talk to your doctor about stop-smoking programs and medicines. These can increase your chances of quitting for good. · Get plenty of rest and sleep. · Take your medicines exactly as prescribed. Call your doctor if you think you are having a problem with your medicine. · Find healthy ways to deal with stress. ? Exercise daily. ? Get plenty of sleep. ? Eat regularly and well. When should you call for help? Call 911 anytime you think you may need emergency care. For example, call if:    · You have severe shortness of breath.     · You have symptoms of a heart attack. These may include:  ? Chest pain or pressure, or a strange feeling in the chest.  ? Sweating. ? Shortness of breath. ? Nausea or vomiting. ? Pain, pressure, or a strange feeling in the back, neck, jaw, or upper belly or in one or both shoulders or arms. ? Lightheadedness or sudden weakness. ? A fast or irregular heartbeat. After you call 911, the  may tell you to chew 1 adult-strength or 2 to 4 low-dose aspirin. Wait for an ambulance.  Do not try to drive yourself. Call your doctor now or seek immediate medical care if:    · Your shortness of breath gets worse or you start to wheeze. Wheezing is a high-pitched sound when you breathe.     · You wake up at night out of breath or have to prop your head up on several pillows to breathe.     · You are short of breath after only light activity or while at rest.   Watch closely for changes in your health, and be sure to contact your doctor if:    · You do not get better over the next 1 to 2 days. Where can you learn more? Go to [email or web address]. org and sign in to your [employer] account. Enter S780 in the [employer] box to learn more about \"Shortness of Breath: Care Instructions. \"     If you do not have an account, please click on the \"Sign Up Now\" link. Current as of: October 26, 2020               Content Version: 12.9  © 2006-2021 Healthwise, [facility]. Care instructions adapted under license by [facility] [place] ([facility]). If you have questions about a medical condition or this instruction, always ask your healthcare professional. [place] [address] any warranty or liability for your use of this information.

## 2021-07-24 NOTE — PROGRESS NOTES
Subjective:      Patient ID: Gisel Rodriguez is a 68 y.o. female who presents today for:  Chief Complaint   Patient presents with    Shortness of Breath     x 1 week, increased inhaler use w/out relief       Patient presents to the office with complaints of shortness of breath. She states this is similar to past asthma exacerbations. She reports that breathing treatments and oral steroids have helped with previous episodes. Shortness of Breath  This is a new problem. The current episode started 1 to 4 weeks ago. The problem occurs constantly. The problem has been gradually worsening. Pertinent negatives include no chest pain, fever, headaches, leg pain, leg swelling, rhinorrhea, sore throat, sputum production, swollen glands, syncope or wheezing. Nothing aggravates the symptoms. Associated symptoms comments: No known exposure to covid. Risk factors include recent leg injury. She has tried beta agonist inhalers and steroid inhalers for the symptoms. The treatment provided no relief. Her past medical history is significant for allergies, asthma and pneumonia.        Past Medical History:   Diagnosis Date    Allergic rhinitis 9/18/2013    Anxiety 9/18/2013    Asthma     Carpal tunnel syndrome 5/19/2015    Cellulitis of toe, right 6/5/2012    Cholelithiasis, common bile duct 1/10/2012    CKD (chronic kidney disease) stage 3, GFR 30-59 ml/min ([facility]) 9/18/2013    Depression     Dyslipidemia 11/26/2011    GERD (gastroesophageal reflux disease)     Gout 10/24/2011    HTN (hypertension) 10/24/2011    Hypertension     Irritable bowel syndrome 11/26/2011    Microscopic hematuria 12/13/2012    Midsternal chest pain     Migraine headache 2/22/2012    Multiple-type hyperlipidemia 5/19/2015    Muscle spasm 10/24/2011    Obesity, Class III, BMI 40-49.9 (morbid obesity) ([facility] [address] 11/26/2011    JARON (obstructive sleep apnea) 9/18/2013    Osteoarthritis     Other activity(E029.9)     sensative to vicryl sutures    Palpitations     Polyarthralgia 10/24/2011    Screening mammogram     Skin cancer 10/10/2016    Splenic mass 12/13/2012     Past Surgical History:   Procedure Laterality Date    BUNIONECTOMY      removal    CARDIOVASCULAR STRESS TEST      2011 normal    CHOLECYSTECTOMY      HYSTERECTOMY      SINUS SURGERY      TONSILLECTOMY       Family History   Problem Relation Age of Onset    Heart Disease Mother     Cancer Father         lung     Allergies   Allergen Reactions    Augmentin [Amoxicillin-Pot Clavulanate] Other (See Comments)     Lightheadedness and dizziness    Aspartame      welts    Atorvastatin Calcium     Carbapenems     Cephalexin Other (See Comments)     Mood, gi upset     Clonidine Derivatives     Diclofenac Sodium     Doxycycline Hyclate     Enalapril     Fexofenadine Hydrochloride     Flagyl [Metronidazole]     Imipenem     Levofloxacin Other (See Comments)     Nightmares     Lisinopril     Macrolides And Ketolides     Metronidazole Hcl     Nsaids     Oxaprozin     Oxycodone-Acetaminophen      Other reaction(s): Other: See Comments  Pt states she gets gout    Paroxetine     Sulfa Antibiotics     Tape Mesha Sicilian Tape]     Tramadol Other (See Comments)         Review of Systems   Constitutional: Negative for chills, fatigue and fever. HENT: Negative for congestion, rhinorrhea and sore throat. Respiratory: Positive for shortness of breath. Negative for cough, sputum production, chest tightness and wheezing. Cardiovascular: Negative for chest pain, leg swelling and syncope. Neurological: Negative for headaches. Objective:   /60   Pulse 78   Temp 98.6 °F (37 °C) (Infrared)   Resp 16   Ht 5' 6\" (1.676 m)   Wt 280 lb (127 kg)   SpO2 97%   BMI 45.19 kg/m²     Physical Exam  Vitals reviewed. Constitutional:       General: She is not in acute distress. Appearance: She is well-developed. She is not ill-appearing.    HENT:      Head:

## 2021-08-27 NOTE — ED NOTES
Pt returned from CT. Pt was having anxiety during test; new orders received and pt medicated per MAR.       Valeria Zuniga RN  08/27/21 [id number]

## 2021-08-27 NOTE — ED PROVIDER NOTES
[address] [facility] ED  eMERGENCY dEPARTMENT eNCOUnter      Pt Name: Sakshi Aguilar  MRN: [id number]  [place] 1945  Date of evaluation: 8/27/2021  Provider: Saman Reilly MD    [address] [place] [address]       Chief Complaint   Patient presents with    Leg Swelling     x3 days         HISTORY OF PRESENT ILLNESS   (Location/Symptom, Timing/Onset,Context/Setting, Quality, Duration, Modifying Factors, Severity)  Note limiting factors. Sakshi Aguilar is a 68 y.o. female who presents to the emergency department patient has multiple medical issues including hypertension hyperlipidemia chronic renal insufficiency polyarthralgia irritable bowel syndrome obesity migraine headache asthma obstructive sleep apnea anxiety depression gout GERD chronic palpitation patient does have aerosol treatment at home patiently by herself status post cholecystectomy hysterectomy and tonsillectomy along with remote sinus surgery as per medical record increasing short of breath for the last few days time especially on exertion denies any chest tightness chest pain also thinking her legs are puffy and swollen little bit for the last 3 days time came for further evaluation no fever no injury no fall    HPI    NursingNotes were reviewed. REVIEW OF SYSTEMS    (2-9 systems for level 4, 10 or more for level 5)     Review of Systems   Constitutional: Positive for activity change and fatigue. Negative for fever. HENT: Negative for congestion, drooling, facial swelling, mouth sores, nosebleeds, sinus pressure, sore throat, trouble swallowing and voice change. Eyes: Negative for pain, discharge, redness and visual disturbance. Respiratory: Positive for shortness of breath and wheezing. Negative for cough, choking, chest tightness and stridor. Cardiovascular: Positive for leg swelling. Negative for chest pain and palpitations. Gastrointestinal: Negative for abdominal pain, blood in stool, constipation, diarrhea and vomiting. Endocrine: Negative for cold intolerance, polyphagia and polyuria. Genitourinary: Negative for dysuria, flank pain, frequency, genital sores and urgency. Musculoskeletal: Negative for back pain, joint swelling, neck pain and neck stiffness. Skin: Negative for pallor and rash. Neurological: Negative for tremors, seizures, syncope, weakness, numbness and headaches. Hematological: Negative for adenopathy. Does not bruise/bleed easily. Psychiatric/Behavioral: Negative for agitation, behavioral problems, hallucinations and sleep disturbance. The patient is nervous/anxious. The patient is not hyperactive. All other systems reviewed and are negative. Except as noted above the remainder of the review of systems was reviewed and negative.        PAST MEDICAL HISTORY     Past Medical History:   Diagnosis Date    Allergic rhinitis 9/18/2013    Anxiety 9/18/2013    Asthma     Carpal tunnel syndrome 5/19/2015    Cellulitis of toe, right 6/5/2012    Cholelithiasis, common bile duct 1/10/2012    CKD (chronic kidney disease) stage 3, GFR 30-59 ml/min ([facility]) 9/18/2013    Depression     Dyslipidemia 11/26/2011    GERD (gastroesophageal reflux disease)     Gout 10/24/2011    HTN (hypertension) 10/24/2011    Hypertension     Irritable bowel syndrome 11/26/2011    Microscopic hematuria 12/13/2012    Midsternal chest pain     Migraine headache 2/22/2012    Multiple-type hyperlipidemia 5/19/2015    Muscle spasm 10/24/2011    Obesity, Class III, BMI 40-49.9 (morbid obesity) ([facility] [address] 11/26/2011    JARON (obstructive sleep apnea) 9/18/2013    Osteoarthritis     Other activity(E029.9)     sensative to vicryl sutures    Palpitations     Polyarthralgia 10/24/2011    Screening mammogram     Skin cancer 10/10/2016    Splenic mass 12/13/2012         SURGICALHISTORY       Past Surgical History:   Procedure Laterality Date    BUNIONECTOMY      removal    CARDIOVASCULAR STRESS TEST      2011 normal    daily    SUMATRIPTAN (IMITREX) 50 MG TABLET    Take 50 mg by mouth once as needed for Migraine    VALSARTAN-HYDROCHLOROTHIAZIDE (DIOVAN-HCT) 320-25 MG PER TABLET    Take 1 tablet by mouth daily. ALLERGIES     Augmentin [amoxicillin-pot clavulanate], Aspartame, Atorvastatin calcium, Carbapenems, Cephalexin, Clonidine derivatives, Diclofenac sodium, Doxycycline hyclate, Enalapril, Fexofenadine hydrochloride, Flagyl [metronidazole], Imipenem, Levofloxacin, Lisinopril, Macrolides and ketolides, Metronidazole hcl, Nsaids, Oxaprozin, Oxycodone-acetaminophen, Paroxetine, Sulfa antibiotics, Tape [adhesive tape], and Tramadol    FAMILY HISTORY       Family History   Problem Relation Age of Onset    Heart Disease Mother     Cancer Father         lung          SOCIAL HISTORY       Social History     Socioeconomic History    Marital status:      Spouse name: None    Number of children: None    Years of education: None    Highest education level: None   Occupational History    None   Tobacco Use    Smoking status: Former Smoker     Quit date: 1974     Years since quittin.0    Smokeless tobacco: Never Used   Vaping Use    Vaping Use: Never used   Substance and Sexual Activity    Alcohol use: No     Comment: quit     Drug use: No    Sexual activity: None   Other Topics Concern    None   Social History Narrative    None     Social Determinants of Health     Financial Resource Strain:     Difficulty of Paying Living Expenses:    Food Insecurity:     Worried About Running Out of Food in the Last Year:     Ran Out of Food in the Last Year:    Transportation Needs:     Lack of Transportation (Medical):      Lack of Transportation (Non-Medical):    Physical Activity:     Days of Exercise per Week:     Minutes of Exercise per Session:    Stress:     Feeling of Stress :    Social Connections:     Frequency of Communication with Friends and Family:     Frequency of Social Gatherings with Friends and Family:     Attends Tenriism Services:     Active Member of Clubs or Organizations:     Attends Club or Organization Meetings:     Marital Status:    Intimate Partner Violence:     Fear of Current or Ex-Partner:     Emotionally Abused:     Physically Abused:     Sexually Abused:        SCREENINGS      @FLOW([id number])@      PHYSICAL EXAM    (up to 7 for level 4, 8 or more for level 5)     ED Triage Vitals [08/27/21 1305]   BP Temp Temp Source Pulse Resp SpO2 Height Weight   (!) 180/75 98.5 °F (36.9 °C) Oral 86 18 97 % 5' 5\" (1.651 m) 280 lb (127 kg)       Physical Exam  Vitals and nursing note reviewed. Constitutional:       Appearance: She is obese. HENT:      Head: Normocephalic and atraumatic. Right Ear: Tympanic membrane and ear canal normal.      Left Ear: Tympanic membrane, ear canal and external ear normal.      Nose: Nose normal.      Mouth/Throat:      Mouth: Mucous membranes are moist.      Pharynx: No oropharyngeal exudate or posterior oropharyngeal erythema. Eyes:      General:         Left eye: No discharge. Extraocular Movements: Extraocular movements intact. Pupils: Pupils are equal, round, and reactive to light. Cardiovascular:      Rate and Rhythm: Regular rhythm. Pulses: Normal pulses. Heart sounds: No murmur heard. No friction rub. No gallop. Pulmonary:      Effort: Pulmonary effort is normal.      Breath sounds: No stridor. Wheezing present. No rhonchi. Comments: Attention given to the respiratory system patient air entry good faint expiratory wheezing noted in the bases no retractions chest wall  Abdominal:      General: There is no distension. Tenderness: There is no abdominal tenderness. There is no left CVA tenderness, guarding or rebound. Hernia: No hernia is present. Musculoskeletal:         General: Swelling present. No deformity. Cervical back: Normal range of motion. No rigidity or tenderness.       Right lower leg: No edema. Comments: Minimal puffiness of the both legs noticeable but there is no cellulitis no insect bite no pitting edema noted   Lymphadenopathy:      Cervical: No cervical adenopathy. Skin:     Capillary Refill: Capillary refill takes less than 2 seconds. Neurological:      General: No focal deficit present. Mental Status: She is alert. Cranial Nerves: No cranial nerve deficit. Sensory: No sensory deficit. Motor: No weakness. Coordination: Coordination normal.      Gait: Gait normal.      Deep Tendon Reflexes: Reflexes normal.         DIAGNOSTIC RESULTS     EKG: All EKG's are interpreted by the Emergency Department Physician who either signs or Co-signsthis chart in the absence of a cardiologist.        RADIOLOGY:   [place] such as CT, Ultrasound and MRI are read by the radiologist. Plain radiographic images are visualized and preliminarily interpreted by the emergency physician with the below findings:        Interpretation per the Radiologist below, if available at the time ofthis note:    CTA Chest W WO  (PE study)   Final Result      Limited study. No CT evidence pulmonary embolism main, right, and left pulmonary arteries, as well as interlobar and proximal segmental branches. Distal segmental, and subsegmental branches cannot be assessed as discussed. 1.3 cm cortical area discussed, posterior upper pole left kidney, does not meet CT criteria for simple cyst. If clinical concern warrants, nonemergent sonography may be obtained for further evaluation to rule out malignancy. All CT scans at this facility use dose modulation, iterative reconstruction, and/or weight based dosing when appropriate to reduce radiation dose to as low as reasonably achievable. US DUP LOWER EXTREMITIES BILATERAL VENOUS   Final Result   NO FINDINGS OF  DEEP VENOUS THROMBUS IN THE VISUALIZED VESSELS OF THE LEFT OR RIGHT  LOWER EXTREMITY.       XR CHEST PORTABLE   Final Result   NO ACUTE ACTIVE CARDIOPULMONARY PROCESS            ED BEDSIDE ULTRASOUND:   Performed by ED Physician - none    LABS:  Labs Reviewed   COMPREHENSIVE METABOLIC PANEL - Abnormal; Notable for the following components:       Result Value    Glucose 106 (*)     CREATININE 1.35 (*)     GFR Non- 38.1 (*)     GFR  46.1 (*)     Total Bilirubin 0.8 (*)     ALT 37 (*)     AST 36 (*)     All other components within normal limits   CBC WITH AUTO DIFFERENTIAL - Abnormal; Notable for the following components:    RBC 3.87 (*)     MCV 99.0 (*)     MCHC 32.1 (*)     Monocytes % 13.2 (*)     All other components within normal limits   MAGNESIUM - Abnormal; Notable for the following components:    Magnesium 1.6 (*)     All other components within normal limits   D-DIMER, QUANTITATIVE - Abnormal; Notable for the following components:    D-Dimer, Quant 1.05 (*)     All other components within normal limits    Narrative:     CALL  Mejia  LOER tel. [phone],  Coag results called to and read back by suzanne, 08/27/2021 14:13, by Rob Fregoso   TROPONIN   URINE RT REFLEX TO [address]       All other labs were within normal range or not returned as of this dictation.     EMERGENCY DEPARTMENT COURSE and DIFFERENTIAL DIAGNOSIS/MDM:   Vitals:    Vitals:    08/27/21 1305 08/27/21 1510 08/27/21 1538   BP: (!) 180/75  (!) 177/83   Pulse: 86 85 94   Resp: 18  22   Temp: 98.5 °F (36.9 °C)     TempSrc: Oral     SpO2: 97%  95%   Weight: 280 lb (127 kg)     Height: 5' 5\" (1.651 m)             MDM  Number of Diagnoses or Management Options  Bilateral lower extremity edema: established and improving  Mild intermittent asthma without complication: established and improving  Diagnosis management comments: Patient had a good diuresis after 20 mg Lasix short of breath on exertion patient positive D-dimer ultrasound of both legs negative CAT scan

## 2021-10-27 PROBLEM — U07.1 COVID-19: Status: ACTIVE | Noted: 2021-01-01

## 2021-10-27 NOTE — ED PROVIDER NOTES
2000 [facility] ED  eMERGENCY dEPARTMENT eNCOUnter      Pt Name: Letha Souza  MRN: [id number]  [place] 1945  Date of evaluation: 10/27/2021  Provider: Marvin Silva MD    [address] [place] [address]       Chief Complaint   Patient presents with    Shortness of Breath         HISTORY OF PRESENT ILLNESS   (Location/Symptom, Timing/Onset,Context/Setting, Quality, Duration, Modifying Factors, Severity)  Note limiting factors. Letha Souza is a 68 y.o. female who presents to the emergency department patient non-smoker history of asthma obesity hyperlipidemia hypertension renal insufficiency polyarthralgia irritable bowel syndrome migraine headache anxiety gout GERD becoming short of breath for the last 5 to 6 days time cough which is slightly becoming productive chills and feverish feeling patient think that she may be getting Covid as she was exposed to COVID-19 paramedics were called found to be low oxygenation put on oxygen given a gram of magnesium sulfate in route and brought it here    HPI    NursingNotes were reviewed. REVIEW OF SYSTEMS    (2-9 systems for level 4, 10 or more for level 5)     Review of Systems   Constitutional: Positive for activity change, appetite change, chills, diaphoresis, fatigue and fever. HENT: Positive for congestion, postnasal drip, rhinorrhea and sinus pain. Negative for drooling, facial swelling, mouth sores, nosebleeds, sinus pressure, sore throat, trouble swallowing and voice change. Eyes: Negative for pain, discharge, redness and visual disturbance. Respiratory: Positive for cough, shortness of breath and wheezing. Negative for choking, chest tightness and stridor. Cardiovascular: Negative for chest pain, palpitations and leg swelling. Gastrointestinal: Negative for abdominal pain, blood in stool, constipation, diarrhea and vomiting. Endocrine: Negative for cold intolerance, polyphagia and polyuria.    Genitourinary: Negative for dysuria, flank ALBUTEROL (PROVENTIL) 2.5 MG/0.5ML NEBU NEBULIZER SOLUTION    Take 0.5 mLs by nebulization every 6 hours as needed for Wheezing    AMLODIPINE (NORVASC) 10 MG TABLET    Take 1 tablet by mouth daily    ASCORBIC ACID (VITAMIN C) 500 MG CAPS    Take 1 tablet by mouth 2 times daily     ASPIRIN 81 MG TABLET    Take 81 mg by mouth daily. BISACODYL (DULCOLAX) 5 MG EC TABLET    Use as directed for Miralax / Gatorade Bowel Prep Kit    CARVEDILOL (COREG) 25 MG TABLET    Take 0.5 tablets by mouth 2 times daily (with meals) At night    CHOLECALCIFEROL 50 MCG (2000 UT) TABS    Take 2,000 Units by mouth daily    CITALOPRAM (CELEXA) 20 MG TABLET    TAKE 1 TABLET BY MOUTH DAILY    COLCHICINE (COLCRYS) 0.6 MG TABLET    Take 0.6 mg by mouth every other day     FEBUXOSTAT 40 MG TABS TABLET    Take 40 mg by mouth daily    FLUTICASONE (FLONASE) 50 MCG/ACT NASAL SPRAY    use 2 (TWO) sprays nasally daily AS DIRECTED. FLUTICASONE-SALMETEROL (ADVAIR DISKUS) 500-50 MCG/DOSE DISKUS INHALER    Inhale 1 puff into the lungs 2 times daily    FUROSEMIDE (LASIX) 20 MG TABLET    Take 1 tablet by mouth daily    LEVOCETIRIZINE (XYZAL) 5 MG TABLET    Take 1 tablet by mouth once daily. LORAZEPAM (ATIVAN) 1 MG TABLET    Take 1 tablet by mouth every 6 hours as needed. LOSARTAN (COZAAR) 100 MG TABLET    Take 1 tablet by mouth once daily. LOVASTATIN (MEVACOR) 40 MG TABLET    Take 1 tablet by mouth nightly    MAGNESIUM OXIDE (MAGNESIUM-OXIDE) 250 MG TABS TABLET    Take 1 tablet by mouth daily    MULTIPLE VITAMINS-MINERALS (SENIOR MULTIVITAMIN PLUS) TABS    Take 1 tablet by mouth daily.     NEXIUM 40 MG DELAYED RELEASE CAPSULE    Take 1 capsule by mouth 2 times daily (before meals)    POLYETHYLENE GLYCOL (GLYCOLAX) 17 GM/SCOOP POWDER    Use as directed for Miralax / Gatorade Bowel Prep Kit    POTASSIUM CHLORIDE (KLOR-CON M) 20 MEQ EXTENDED RELEASE TABLET    Take 1 tablet by mouth daily    SUMATRIPTAN (IMITREX) 50 MG TABLET    Take 50 mg by mouth once as needed for Migraine    VALSARTAN-HYDROCHLOROTHIAZIDE (DIOVAN-HCT) 320-25 MG PER TABLET    Take 1 tablet by mouth daily. ALLERGIES     Augmentin [amoxicillin-pot clavulanate], Aspartame, Atorvastatin calcium, Carbapenems, Cephalexin, Clonidine derivatives, Diclofenac sodium, Doxycycline hyclate, Enalapril, Fexofenadine hydrochloride, Flagyl [metronidazole], Imipenem, Levofloxacin, Lisinopril, Macrolides and ketolides, Metronidazole hcl, Nsaids, Oxaprozin, Oxycodone-acetaminophen, Paroxetine, Sulfa antibiotics, Tape [adhesive tape], and Tramadol    FAMILY HISTORY       Family History   Problem Relation Age of Onset    Heart Disease Mother     Cancer Father         lung          SOCIAL HISTORY       Social History     Socioeconomic History    Marital status:      Spouse name: None    Number of children: None    Years of education: None    Highest education level: None   Occupational History    None   Tobacco Use    Smoking status: Former Smoker     Quit date: 1974     Years since quittin.2    Smokeless tobacco: Never Used   Vaping Use    Vaping Use: Never used   Substance and Sexual Activity    Alcohol use: No     Comment: quit     Drug use: No    Sexual activity: None   Other Topics Concern    None   Social History Narrative    None     Social Determinants of Health     Financial Resource Strain:     Difficulty of Paying Living Expenses:    Food Insecurity:     Worried About Running Out of Food in the Last Year:     Ran Out of Food in the Last Year:    Transportation Needs:     Lack of Transportation (Medical):      Lack of Transportation (Non-Medical):    Physical Activity:     Days of Exercise per Week:     Minutes of Exercise per Session:    Stress:     Feeling of Stress :    Social Connections:     Frequency of Communication with Friends and Family:     Frequency of Social Gatherings with Friends and Family:     Attends Restorationism Services:     Active Mood is anxious. DIAGNOSTIC RESULTS     EKG: All EKG's are interpreted by the Emergency Department Physician who either signs or Co-signsthis chart in the absence of a cardiologist.        RADIOLOGY:   [place] such as CT, Ultrasound and MRI are read by the radiologist. Plain radiographic images are visualized and preliminarily interpreted by the emergency physician with the below findings:        Interpretation per the Radiologist below, if available at the time ofthis note:    XR CHEST PORTABLE    (Results Pending)         ED BEDSIDE ULTRASOUND:   Performed by ED Physician - none    LABS:  Labs Reviewed   COVID-19, RAPID - Abnormal; Notable for the following components:       Result Value    SARS-CoV-2, NAAT DETECTED (*)     All other components within normal limits    Narrative:     Jim PAL tel. [phone],  Microbiology results called to and read back by deborah manzano, 10/27/2021 17:52, by  FELECIA   CBC WITH AUTO DIFFERENTIAL - Abnormal; Notable for the following components:    MCV 95.7 (*)     MCH 33.2 (*)     Platelets 799 (*)     Eosinophils % 0.0 (*)     Lymphocytes Absolute 1.1 (*)     All other components within normal limits   CULTURE, BLOOD 1   CULTURE, BLOOD 2   COMPREHENSIVE METABOLIC PANEL   MAGNESIUM   TROPONIN   URINE RT REFLEX TO CULTURE   LACTIC ACID, PLASMA       All other labs were within normal range or not returned as of this dictation.     EMERGENCY DEPARTMENT COURSE and DIFFERENTIAL DIAGNOSIS/MDM:   Vitals:    Vitals:    10/27/21 1724   BP: (!) 174/80   Pulse: 81   Resp: 20   SpO2: 90%   Height: 5' 5\" (1.651 m)           MDM  Number of Diagnoses or Management Options  COVID-19 virus infection  Hypoxia  Diagnosis management comments: Patient sick for the last 5 to 6 days time with some exposure to COVID-19 infection as per patient she called the paramedics today because of increasing short of breath noted to have been low oxygenation to 80% saturation given oxygen started with the mag sulfate in route and brought it here patient working hard difficult time talking full sentences initially slightly anxious EKG performed normal sinus rhythm rate of 77/min LA interval 154 ms and QRS duration 90 ms while QT interval is 400 ms patient signed out to oncoming doctor at 1900 hrs. to make further dispo all the nearby hospitals are full at this time for the Covid pereira as discussed with hospitalist at [facility] transfer center got involved with [facility] requesting a blood gas patient accepted for transfer but they the patient in line to be admitted        Amount and/or Complexity of Data Reviewed  Clinical lab tests: ordered and reviewed        CRITICAL CARE TIME   Total Critical Care time was  minutes, excluding separately reportableprocedures. There was a high probability of clinicallysignificant/life threatening deterioration in the patient's condition which required my urgent intervention. CONSULTS:  None    PROCEDURES:  Unless otherwise noted below, none     Procedures    FINAL IMPRESSION      1. Hypoxia    2. COVID-19 virus infection          DISPOSITION/PLAN   DISPOSITION        PATIENT REFERRED TO:  No follow-up provider specified.     DISCHARGE MEDICATIONS:  New Prescriptions    No medications on file          (Please note that portions of this note were completed with a voice recognition program.  Efforts were made to edit the dictations but occasionally words are mis-transcribed.)    Steve England MD (electronically signed)  Attending Emergency Physician       Steve England MD  10/27/21 1695       Steve England MD  10/29/21 0169       Steve England MD  12/08/21 9487

## 2021-10-28 NOTE — CARE COORDINATION
INTERDISCIPLINARY ROUNDING    October 28, 2021 at 3:48 PM EDT    Anticipated Discharge Date:   TBD    Anticipated Discharge Disposition:TBD    Patient Mobility or PT/OT ordered: NO, NOT STABLE FOR EVALS AT THIS TIME    Readmission Risk              Risk of Unplanned Readmission:  16           Discussed patient goal for the day, patient clinical progression, and barriers to discharge. The following Goal(s) of the Day/Commitment(s) have been identified:  PLAN OF CARE DISCUSSED WITH ICU TEAM IN ROUNDS    HHF 60L/60% WITH NRB  DECADRON, REMDESIVIR STARTED 10/28, PRECEDEX GTT  PER DOCUMENTATION OF DR. TABARES AND DR. FERGUSON- PT WISHES TO BE DNRCCA- NO INTUBATION. ATTEMPT TO REACH PT. FOR CMI/ACP UNSUCCESSFUL. WILL CONTINUE TO ATTEMPT. PER NURSING DOCUMENTATION, PT LIVES ALONE.     1709- PT DOES NOT HAVE PHONE IN ROOM. ATTEMPT TO CALL PT PERSONAL PHONE, NO ANSWER. UNCLEAR IF THAT IS A CELL #. NURSING WILL GET PHONE FOR PT. PT DID GIVE PERMISSION TO CONTACT [facility]- [facility] IN EMERGENCY CONTACTS IF NEEDED FOR INFORMATION.      Flakita Ceballos RN  October 28, 2021

## 2021-10-28 NOTE — PROGRESS NOTES
Hospitalist Progress Note      PCP: Malena Luna MD    Date of Admission: 10/27/2021    Chief Complaint:  No acute events, no fevers,hypertensive, on HFNC-60 liters    Medications:  Reviewed    Infusion Medications    dexmedetomidine 0.5 mcg/kg/hr (10/28/21 0605)    sodium chloride 100 mL/hr at 10/28/21 0941     Scheduled Medications    valsartan  320 mg Oral Daily    pantoprazole  40 mg Oral QAM AC    pravastatin  40 mg Oral Nightly    carvedilol  6.25 mg Oral BID WC    citalopram  20 mg Oral Daily    colchicine  0.6 mg Oral Every Other Day    aspirin  81 mg Oral Daily    febuxostat  40 mg Oral Daily    fluticasone  2 spray Each Nostril Daily    [START ON 10/29/2021] remdesivir IVPB  100 mg IntraVENous Q24H    enoxaparin  30 mg SubCUTAneous BID    dexamethasone  6 mg IntraVENous Q24H    guaiFENesin  600 mg Oral BID    albuterol-ipratropium  1 puff Inhalation Q6H     PRN Meds: hydrALAZINE **OR** hydrALAZINE      Intake/Output Summary (Last 24 hours) at 10/28/2021 0955  Last data filed at 10/28/2021 0555  Gross per 24 hour   Intake 830 ml   Output 1400 ml   Net -570 ml       Exam:    BP (!) 172/57   Pulse 64   Temp 97.3 °F (36.3 °C) (Bladder)   Resp 27   Wt 291 lb 0.1 oz (132 kg)   SpO2 90%   BMI 48.43 kg/m²     General appearance: awake, on HFNC  Respiratory:  Diminished with crackles bilaterally . Cardiovascular: Regular rate and rhythm, S1/S2 . Abdomen: Soft, active bowel sounds. Musculoskeletal: No edema bilaterally.     Labs:   Recent Labs     10/27/21  1756 10/28/21  0503   WBC 5.1 2.8*   HGB 13.2 13.5   HCT 38.1 39.5   * 146     Recent Labs     10/27/21  1756 10/28/21  0503   * 132*   K 4.4 4.1   CL 94* 97   CO2 18* 18*   BUN 19 18   CREATININE 1.25* 1.13*   CALCIUM 8.5 8.5     Recent Labs     10/27/21  1756 10/28/21  0503   AST 61* 47*   ALT 30 29   BILITOT 0.7 0.4   ALKPHOS 50 51     Recent Labs     10/28/21  0503   INR 1.1     Recent Labs     10/27/21  5646 TROPONINI <0.010       Urinalysis:      Lab Results   Component Value Date    NITRU Negative 10/27/2021    WBCUA 10-20 10/27/2021    BACTERIA MODERATE 10/27/2021    RBCUA 5-10 10/27/2021    BLOODU Moderate 10/27/2021    SPECGRAV >=1.030 10/27/2021    GLUCOSEU Negative 10/27/2021    GLUCOSEU NEG 12/17/2011       Radiology:  US DUP LOWER EXTREMITIES BILATERAL VENOUS    (Results Pending)   US DUP UPPER EXTREMITY RIGHT VENOUS    (Results Pending)   US DUP UPPER EXTREMITY LEFT VENOUS    (Results Pending)           Assessment/Plan:    22-year-old female with history of HTN, asthma, CKD3, GERD, gout, JARON, morbid obesity who presented with:    Acute hypoxic respiratory failure   - secondary to COVID-19 pneumonia  - requiring ICU, HFNC and precedex  - on IV Remdesivir, Decadron, baricitinib, SC Lovenox  - critical care and ID consulted    Hyponatremia   - improving    AG metabolic acidosis  - lactate was normal  - monitor    HTN  - continue home meds  - IV Hydralazine as needed    CKD3  - monitor renal function      Diet: ADULT DIET;  Regular; Safety Tray; Safety Tray (Disposables)    Code Status: DNR-CCA/DNI              Electronically signed by Arabella Pressley MD on 10/28/2021 at 9:55 AM

## 2021-10-28 NOTE — PROGRESS NOTES
Pt states she is hallucinating, has seen a little girl in the corner and her mother in the window. Pt provided with written and verbal education on Olumiant and became concerned about possible allergic reactions, Dr. Farhana Noguera in to speak with pt about hallucinations and Olumiant, pt agreeable to take med. Pt provided with assistance eating lunch.

## 2021-10-28 NOTE — PROGRESS NOTES
Patient received from [facility] around 2300. Respirations labored at times. precedex started and patient is much more relaxed now.  sao2 90% on high flow nasal cannula with nonrebreather mask over.

## 2021-10-28 NOTE — H&P
Hospital Medicine  History and Physical    Patient:  Luh Reed  MRN: [id number]    CHIEF COMPLAINT:  Respiratory failure    History Obtained From:  patient, electronic medical record  Primary Care Physician: Matt Ng MD    HISTORY OF PRESENT ILLNESS:   The patient is a 68 y.o. female who presents with hypoxic respiratory failure secondary to COVID-19 pneumonia. Patient is a 26-year-old female with a history of asthma, not chronically on supplemental O2, CKD, GERD, hypertension, hyperlipidemia, gout, JARON with morbid obesity, and chronic pain. Patient is not vaccinated. Symptoms started approximately 6 days ago she developed progressively worsening shortness of breath and came to the ER for evaluation she was tested positive for COVID-19. In the emergency department PF ratio was 96 she required 100% FiO2 via non-rebreather. T-max 99.1 respirations 33 pulse 91 blood pressure 184/65. Base metabolic panel shows a sodium 127 with a creatinine of 1.25. Mild bump in AST at 61 ALT 30. ABG done showed a pH of 7.36 PCO2 35 PO2 59 and bicarb of 20 this is on 100% FiO2 with O2 sat of 90%. Chest CT shows diffuse bilateral infiltrates throughout the lung fields not present on CT chest done in August.  No PE is noted on CT scan. Patient denies any nausea vomiting diarrhea constipation she does have a cough with some sputum production dyspnea on exertion. No myalgias muscle aches no bleeding or bruising. CODE STATUS is discussed at the time of admission and patient is adamant against being intubated or receiving CPR.     Past Medical History:      Diagnosis Date    Allergic rhinitis 9/18/2013    Anxiety 9/18/2013    Asthma     Carpal tunnel syndrome 5/19/2015    Cellulitis of toe, right 6/5/2012    Cholelithiasis, common bile duct 1/10/2012    CKD (chronic kidney disease) stage 3, GFR 30-59 ml/min ([facility]) 9/18/2013    Depression     Dyslipidemia 11/26/2011    GERD (gastroesophageal reflux disease)  Gout 10/24/2011    HTN (hypertension) 10/24/2011    Hypertension     Irritable bowel syndrome 11/26/2011    Microscopic hematuria 12/13/2012    Midsternal chest pain     Migraine headache 2/22/2012    Multiple-type hyperlipidemia 5/19/2015    Muscle spasm 10/24/2011    Obesity, Class III, BMI 40-49.9 (morbid obesity) ([address] 11/26/2011    JARON (obstructive sleep apnea) 9/18/2013    Osteoarthritis     Other activity(E029.9)     sensative to vicryl sutures    Palpitations     Polyarthralgia 10/24/2011    Screening mammogram     Skin cancer 10/10/2016    Splenic mass 12/13/2012       Past Surgical History:      Procedure Laterality Date    BUNIONECTOMY      removal    CARDIOVASCULAR STRESS TEST      2011 normal    CHOLECYSTECTOMY      HYSTERECTOMY      SINUS SURGERY      TONSILLECTOMY         Medications Prior to Admission:    Prior to Admission medications    Medication Sig Start Date End Date Taking? Authorizing Provider   furosemide (LASIX) 20 MG tablet Take 1 tablet by mouth daily 8/27/21   Antonio Jackson MD   polyethylene glycol [facility]) 17 GM/SCOOP powder Use as directed for Miralax / Gatorade Bowel Prep Kit 3/22/21   Historical Provider, MD   Magnesium Oxide (MAGNESIUM-OXIDE) 250 MG TABS tablet Take 1 tablet by mouth daily    Historical Provider, MD   losartan (COZAAR) 100 MG tablet Take 1 tablet by mouth once daily. 7/2/21   Historical Provider, MD   levocetirizine (XYZAL) 5 MG tablet Take 1 tablet by mouth once daily.  5/25/21   Historical Provider, MD   Cholecalciferol 50 MCG (2000 UT) TABS Take 2,000 Units by mouth daily    Historical Provider, MD   bisacodyl (DULCOLAX) 5 MG EC tablet Use as directed for Miralax / Gatorade Bowel Prep Kit 3/22/21   Historical Provider, MD   albuterol (PROVENTIL) 2.5 MG/0.5ML NEBU nebulizer solution Take 0.5 mLs by nebulization every 6 hours as needed for Wheezing 7/24/21   Adolph Matias MD   amLODIPine (NORVASC) 10 MG tablet Take 1 tablet by mouth daily Levofloxacin, Lisinopril, Macrolides and ketolides, Metronidazole hcl, Nsaids, Oxaprozin, Oxycodone-acetaminophen, Paroxetine, Sulfa antibiotics, Tape [adhesive tape], and Tramadol    Social History:   TOBACCO:   reports that she quit smoking about 47 years ago. She has never used smokeless tobacco.  ETOH:   reports no history of alcohol use. OCCUPATION: None currently    Family History:       Problem Relation Age of Onset    Heart Disease Mother     Cancer Father         lung       REVIEW OF SYSTEMS:  Ten systems reviewed and negative except for as above. Physical Exam:    Vitals: BP (!) 184/65   Pulse 91   Temp 99.1 °F (37.3 °C) (Bladder)   Resp 30   Wt 279 lb 15.8 oz (127 kg)   SpO2 90%   BMI 46.59 kg/m²   Constitutional: No acute respiratory distress. Awake and alert  Skin: Skin color, texture, turgor normal. No rashes or lesions  Eyes:Eye: Normal external eye, conjunctiva, MELANIE. ENT: Head: Normocephalic, no lesions, without obvious abnormality. Neck: no adenopathy, no carotid bruit, no JVD, supple, symmetrical, trachea midline and thyroid not enlarged, symmetric, no tenderness/mass/nodules  Respiratory: Diffuse rales bilaterally but decreased air movement occasional wheezes  Cardiovascular: regular rate and rhythm, S1, S2 normal, no murmur, click, rub or gallop  Gastrointestinal: soft, non-tender; bowel sounds normal; no masses,  no organomegaly  Genitourinary: Deferred  Musculoskeletal:extremities normal, atraumatic, bilateral lower extremity trace edema  Neurologic: Mental status AAOx3 No facial asymmetry or droop. Normal muscle strength b/l. Psychiatric: Appropriate mood and affect.  Good insight and judgement  Hematologic: No obvious bruising or bleeding    Recent Labs     10/27/21  1756   WBC 5.1   HGB 13.2   *     Recent Labs     10/27/21  1756   *   K 4.4   CL 94*   CO2 18*   BUN 19   CREATININE 1.25*   GLUCOSE 121*   AST 61*   ALT 30   BILITOT 0.7   ALKPHOS 50     Troponin T:   Recent Labs     10/27/21  1756   TROPONINI <0.010       ABGs:   Lab Results   Component Value Date    PHART 7.364 10/27/2021    PO2ART 59 10/27/2021    ZWV1NRU 35 10/27/2021     INR: No results for input(s): INR in the last 72 hours. URINALYSIS:  Recent Labs     10/27/21  2026   COLORU Yellow   PHUR 6.0   WBCUA 10-20*   RBCUA 5-10*   BACTERIA MODERATE*   CLARITYU Clear   SPECGRAV >=1.030   LEUKOCYTESUR Negative   UROBILINOGEN 0.2   BILIRUBINUR Negative   BLOODU Moderate   GLUCOSEU Negative     -----------------------------------------------------------------   XR CHEST PORTABLE    Result Date: 10/27/2021  Exam: XR CHEST PORTABLE History: Hypoxia Technique: AP portable view of the chest obtained. Comparison: Portal chest radiograph August 27, 2021 Findings: Atherosclerotic calcification of the thoracic aorta. The cardiomediastinal silhouette is within normal limits. Bilateral patchy airspace opacities most significantly involving the left lung base. No pneumothorax or pleural effusion. No acute osseous normality. Bilateral pneumonia. EKG: Normal sinus rhythm    Assessment and Plan   1. Acute hypoxic respiratory failure secondary to COVID-19 pneumonia: Likely ARDS secondary to COVID-19. Continue high flow O2, patient was intolerant to BiPAP prone as tolerated. Decadron x10 days. Consult pharmacy to initiate remdesivir. Consult pulmonary for further assistance as well as for consideration of baricitinib or tocilizumab. Mucinex Acapella I-S for expectoration. Follow coagulation and inflammatory markers. 2. Hypertension: As needed hydralazine continue home Coreg, valsartan  3. GERD: Protonix  4. Morbid obesity: Dietary education prior to discharge  5. CKD: Avoid nephrotoxic agents follow BMP  6. JARON continue to encourage BiPAP at times, including Precedex if necessary  7.  Gout: Uloric, colchicine  DVT prophylaxis Lovenox per Covid protocol    Approximately 45 minutes critical care time directly providing patient care. Patient Active Problem List   Diagnosis Code    Polyarthralgia M25.50    HTN (hypertension) I10    Gout M10.9    GERD (gastroesophageal reflux disease) K21.9    Dyslipidemia E78.5    Irritable bowel syndrome K58.9    Obesity, Class III, BMI 40-49.9 (morbid obesity) ([facility]) E66.01    Migraine headache G43.909    Microscopic hematuria R31.29    Splenic mass R16.1    Allergic rhinitis J30.9    CKD (chronic kidney disease) stage 3, GFR 30-59 ml/min ([facility]) N18.30    Asthma J45.909    JARON (obstructive sleep apnea) G47.33    Anxiety F41.9    OA (osteoarthritis) of knee M17.10    Midsternal chest pain R07.89    Palpitation R00.2    Bunion M21.619    Carpal tunnel syndrome G56.00    Chronic maxillary sinusitis J32.0    Melancholia F32. A    Ganglion of tendon sheath M67.40    Glaucoma suspect H40.009    Acquired hallux rigidus M20.20    Hypermetropia H52.00    Lateral epicondylitis M77.10    Multiple-type hyperlipidemia E78.2    Nuclear senile cataract H25.10    Primary localized osteoarthrosis of ankle and foot M19.079    Sensory hearing loss, bilateral H90.3    Swelling, mass, or lump in head and neck R22.0, R22.1    Menopausal symptom N95.1    Tear film insufficiency H04.129    Skin cancer C44.90    Pneumonia J18.9    Moderate persistent asthma, uncomplicated P66.48    [id number] U07.1       Loli Jiménez DO, MD  Admitting Hospitalist    Emergency Contact:

## 2021-10-28 NOTE — CONSULTS
Pulmonary and Critical Care Medicine  Consult Note  Encounter Date: 10/28/2021 10:10 AM    Ms. Ying Chester is a 68 y.o. female  : 1945  Requesting Provider: Dr. Calvin York    Reason for request: resp failure          HISTORY OF PRESENT ILLNESS:    Patient is 68 y.o. presents initially to [facility] with complaints of shortness of breath. The patient states that she was recently exposed to a neighbor that was found to be positive for Covid. Over the last several days she has noticed some increasing shortness of breath as well as a cough. She initially thought this was secondary to sinus disease as she has a history of allergies as well as sinus disease. However she continued to have a cough and shortness of breath and therefore presented to the emergency department. She was found to be positive for COVID-19. The patient was noted to be hypoxic. She was ultimately transferred to [facility] for further care. The patient is currently requiring heated high flow nasal cannula as well as a nonrebreather to keep her oxygen saturations at an appropriate level. She is currently a DNR CCA with no intubation. This was after a lengthy discussion with the hospitalist physician last night. She was started on Decadron and remdesivir. She did have a CT scan of her chest performed at the [facility] hospital that was negative for pulmonary embolism but did demonstrate evidence of groundglass changes. Currently she denies any complaints of chest pain or chest discomfort. She does have a cough that is mostly nonproductive in nature. She does complain of some shortness of breath. Discussed lying on her stomach to have prone positioning, however she states she did try this last evening and was not able to tolerate it.           Past Medical History:        Diagnosis Date    Allergic rhinitis 2013    Anxiety 2013    Asthma     Carpal tunnel syndrome 2015    Cellulitis of toe, right 2012    Cholelithiasis, common bile duct 1/10/2012    CKD (chronic kidney disease) stage 3, GFR 30-59 ml/min ([facility]) 9/18/2013    Depression     Dyslipidemia 11/26/2011    GERD (gastroesophageal reflux disease)     Gout 10/24/2011    HTN (hypertension) 10/24/2011    Hypertension     Irritable bowel syndrome 11/26/2011    Microscopic hematuria 12/13/2012    Midsternal chest pain     Migraine headache 2/22/2012    Multiple-type hyperlipidemia 5/19/2015    Muscle spasm 10/24/2011    Obesity, Class III, BMI 40-49.9 (morbid obesity) ([address] 11/26/2011    JARON (obstructive sleep apnea) 9/18/2013    Osteoarthritis     Other activity(E029.9)     sensative to vicryl sutures    Palpitations     Polyarthralgia 10/24/2011    Screening mammogram     Skin cancer 10/10/2016    Splenic mass 12/13/2012       Past Surgical History:        Procedure Laterality Date    BUNIONECTOMY      removal    CARDIOVASCULAR STRESS TEST      2011 normal    CHOLECYSTECTOMY      HYSTERECTOMY      SINUS SURGERY      TONSILLECTOMY         Social History:     reports that she quit smoking about 47 years ago. She has never used smokeless tobacco. She reports that she does not drink alcohol and does not use drugs.     Family History:       Problem Relation Age of Onset    Heart Disease Mother     Cancer Father         lung       Allergies:  Augmentin [amoxicillin-pot clavulanate], Aspartame, Atorvastatin calcium, Carbapenems, Cephalexin, Clonidine derivatives, Diclofenac sodium, Doxycycline hyclate, Enalapril, Fexofenadine hydrochloride, Flagyl [metronidazole], Imipenem, Levofloxacin, Lisinopril, Macrolides and ketolides, Metronidazole hcl, Nsaids, Oxaprozin, Oxycodone-acetaminophen, Paroxetine, Sulfa antibiotics, Tape [adhesive tape], and Tramadol        MEDICATIONS during current hospitalization:    Continuous Infusions:   dexmedetomidine 0.5 mcg/kg/hr (10/28/21 0605)    sodium chloride 100 mL/hr at 10/28/21 0941       Scheduled Meds:   valsartan  320 mg Oral Daily    pantoprazole  40 mg Oral QAM AC    pravastatin  40 mg Oral Nightly    carvedilol  6.25 mg Oral BID WC    citalopram  20 mg Oral Daily    colchicine  0.6 mg Oral Every Other Day    aspirin  81 mg Oral Daily    febuxostat  40 mg Oral Daily    fluticasone  2 spray Each Nostril Daily    [START ON 10/29/2021] remdesivir IVPB  100 mg IntraVENous Q24H    enoxaparin  30 mg SubCUTAneous BID    dexamethasone  6 mg IntraVENous Q24H    guaiFENesin  600 mg Oral BID    albuterol-ipratropium  1 puff Inhalation Q6H       PRN Meds:hydrALAZINE **OR** hydrALAZINE        REVIEW OF SYSTEMS:  ROS: 10 organs review of system is done including general, psychological, ENT, hematological, endocrine, respiratory, cardiovascular, gastrointestinal, musculoskeletal, neurological,  allergy and Immunology is done and is otherwise negative.     PHYSICAL EXAM:    Vitals:  BP (!) 172/57   Pulse 64   Temp 97.3 °F (36.3 °C) (Bladder)   Resp 27   Wt 291 lb 0.1 oz (132 kg)   SpO2 90%   BMI 48.43 kg/m²     General: No acute distress, resting comfortably in bed  HEENT: Normocephalic, atraumatic, pupils equal round reactive to light, heated high flow and nonrebreather in place  Lungs : Crackles bilaterally with occasional wheezes, no respiratory distress at rest  Heart[de-identified] Regular rate  ABD: Obese, positive bowel sounds, soft, nontender to palpation  Extremities : Warm, dry  Neuro: Awake, alert, oriented x4  Skin: No rashes appreciated    Data Review  Recent Labs     10/27/21  1756 10/28/21  0503   WBC 5.1 2.8*   HGB 13.2 13.5   HCT 38.1 39.5   * 146      Recent Labs     10/27/21  1756 10/28/21  0503   * 132*   K 4.4 4.1   CL 94* 97   CO2 18* 18*   BUN 19 18   CREATININE 1.25* 1.13*   GLUCOSE 121* 156*       ABGs:   Recent Labs     10/27/21  1857   PHART 7.364   [id number] 35   PO2ART 59*   [id number] 20.1*   BEART -5   [id number] 90*   [id number] 21     O2 Device: Heated high flow cannula (and NRB)  O2 Flow Rate (L/min): 60 L/min  Lab Results   Component Value Date    LACTNORBERTO 1.3 10/27/2021       Radiology:    CTA Chest W WO  (PE study)    Result Date: 10/28/2021  The EXAMINATION: CT scan of the chest with contrast (pulmonary embolism protocol) INDICATION: Short of breath COMPARISON: None TECHNIQUE: Helical CT was performed through the chest utilizing 100 cc of 370 intravenous contrast.  Images were obtained with bolus tracking in order to opacify the pulmonary arteries. Both MIP and 3D volume rendered reconstructions were performed. Exam is limited due to suboptimal opacification as well as streak artifact secondary to patient body habitus. FINDINGS: Limited examination due to suboptimal opacification. There are no findings a gross central or gross proximal port emboli. There is diffuse bilateral patchy multifocal to confluent areas of airspace disease throughout the lung parenchyma. No pleural effusions. No pneumothoraces. No significant periaortic, adenopathy. There is pretracheal, parahilar and subcarinal adenopathy. Within the field-of-view the abdomen is an area of low-attenuation partially visualized within the spleen. It measures approximately 2.3 cm. There is a moderate dorsal kyphosis with multilevel degenerative changes of the thoracic spine. Estil Rodríguez LIMITED EXAM DUE TO SUBOPTIMAL OPACIFICATION. NO CENTRAL OR PROXIMAL PORT EMBOLI. 2. THERE IS DIFFUSE BILATERAL PATCHY MULTIFOCAL TO CONFLUENT AREAS OF AIRSPACE DISEASE THROUGHOUT THE LUNG PARENCHYMA. IMAGING FEATURES CAN BE SEEN WITH (COVID-19) PNEUMONIA, THOUGH ARE NONSPECIFIC AND CAN OCCUR WITH A VARIETY OF INFECTIOUS AND NONINFECTIOUS PROCESSES. 3. PARTIALLY VISUALIZED AREA OF LOW-ATTENUATION SPLEEN. NOT FULLY CHARACTERIZED IN THIS STUDY.  CONSIDER FOLLOW-UP ULTRASOUND TO FURTHER EVALUATE All CT scans at this facility use dose modulation, iterative reconstruction, and/or weight based dosing when appropriate to reduce radiation dose to as low as reasonably achievable. XR CHEST PORTABLE    Result Date: 10/27/2021  Exam: XR CHEST PORTABLE History: Hypoxia Technique: AP portable view of the chest obtained. Comparison: Portal chest radiograph August 27, 2021 Findings: Atherosclerotic calcification of the thoracic aorta. The cardiomediastinal silhouette is within normal limits. Bilateral patchy airspace opacities most significantly involving the left lung base. No pneumothorax or pleural effusion. No acute osseous normality. Bilateral pneumonia. Assessment/Plan:     1. Acute hypoxic respiratory failure--this secondary to COVID-19 pneumonitis. She is currently a DNR CCA with no intubation. She is on maximal settings from heated high flow nasal cannula with the addition of a nonrebreather on top. She reportedly was tried on BiPAP last evening, however was not able to tolerate this secondary to some agitation. She was started on Precedex. BiPAP has not been reinitiated, as she is currently doing well with heated high flow nasal cannula. At this time her O2 will be weaned down as able. We discussed the importance of prone positioning. She states she is not able to tolerate laying on her stomach but will be willing to lie on her side as much as she could. 2. COVID-19 pneumonitis--she was started on Decadron as well as remdesivir. She will be placed on baricitinib. This was discussed with the patient today and she is agreeable. She did have a CT scan of her chest that did demonstrate evidence of bilateral groundglass opacities. 3. Asthma--patient states that generally her asthma is fairly well controlled. She does use Advair and albuterol at home. She does use Flonase as well. She will continue on scheduled MDIs during her hospitalization. 4. Hypertension--patient does have a known history of hypertension. At this time she is currently receiving her home Coreg and Diovan.     Nutrition: Tolerating oral diet    Code Status: DNR CCA    Prophylaxis: Lovenox. Protonix. This is a 68 y.o. critically ill female from acute hypoxic respiratory failure and COVID-19. I did spend a total of 32 minutes of critical care time with the patient, and review of the chart, and discussion with the bedside staff. This time is exclusive of any billable procedures.     Thank you for consultation    Electronically signed by Cameron Velez DO, on 10/28/2021 at 10:10 AM

## 2021-10-28 NOTE — PROGRESS NOTES
Hospitalist Progress Note      PCP: Desirae Skelton MD    Date of Admission: 10/27/2021    Chief Complaint:  No acute events, no fevers,hypertensive, on HFNC-60 liters    Medications:  Reviewed    Infusion Medications    dexmedetomidine 0.5 mcg/kg/hr (10/28/21 1229)    sodium chloride 100 mL/hr at 10/28/21 0941     Scheduled Medications    valsartan  320 mg Oral Daily    pantoprazole  40 mg Oral QAM AC    pravastatin  40 mg Oral Nightly    carvedilol  6.25 mg Oral BID WC    citalopram  20 mg Oral Daily    colchicine  0.6 mg Oral Every Other Day    aspirin  81 mg Oral Daily    febuxostat  40 mg Oral Daily    fluticasone  2 spray Each Nostril Daily    [START ON 10/29/2021] remdesivir IVPB  100 mg IntraVENous Q24H    baricitinib  2 mg Oral Daily    enoxaparin  30 mg SubCUTAneous BID    dexamethasone  6 mg IntraVENous Q24H    guaiFENesin  600 mg Oral BID    albuterol-ipratropium  1 puff Inhalation Q6H     PRN Meds: hydrALAZINE **OR** hydrALAZINE, sennosides-docusate sodium      Intake/Output Summary (Last 24 hours) at 10/28/2021 1637  Last data filed at 10/28/2021 0555  Gross per 24 hour   Intake 830 ml   Output 1400 ml   Net -570 ml       Exam:    BP (!) 184/68   Pulse 66   Temp 98.8 °F (37.1 °C) (Bladder)   Resp 26   Wt 291 lb 0.1 oz (132 kg)   SpO2 93%   BMI 48.43 kg/m²     General appearance: awake, on HFNC  Respiratory:  Diminished with crackles bilaterally . Cardiovascular: Regular rate and rhythm, S1/S2 . Abdomen: Soft, active bowel sounds. Musculoskeletal: No edema bilaterally.     Labs:   Recent Labs     10/27/21  1756 10/28/21  0503   WBC 5.1 2.8*   HGB 13.2 13.5   HCT 38.1 39.5   * 146     Recent Labs     10/27/21  1756 10/28/21  0503   * 132*   K 4.4 4.1   CL 94* 97   CO2 18* 18*   BUN 19 18   CREATININE 1.25* 1.13*   CALCIUM 8.5 8.5     Recent Labs     10/27/21  1756 10/28/21  0503   AST 61* 47*   ALT 30 29   BILITOT 0.7 0.4   ALKPHOS 50 51     Recent Labs 10/28/21  0503   INR 1.1     Recent Labs     10/27/21  1756   TROPONINI <0.010       Urinalysis:      Lab Results   Component Value Date    NITRU Negative 10/27/2021    WBCUA 10-20 10/27/2021    BACTERIA MODERATE 10/27/2021    RBCUA 5-10 10/27/2021    BLOODU Moderate 10/27/2021    SPECGRAV >=1.030 10/27/2021    GLUCOSEU Negative 10/27/2021    GLUCOSEU NEG 12/17/2011       Radiology:  US DUP UPPER EXTREMITY LEFT VENOUS   Final Result   NO SONOGRAPHIC EVIDENCE, DEEP VEIN THROMBOSIS, LEFT UPPER EXTREMITY, WITH ABOVE LIMITATIONS. US DUP UPPER EXTREMITY RIGHT VENOUS   Final Result   NO SONOGRAPHIC EVIDENCE , DEEP VEIN THROMBOSIS, RIGHT UPPER EXTREMITY. US DUP LOWER EXTREMITIES BILATERAL VENOUS   Final Result   NO ULTRASOUND SIGNS OF THROMBUS IN THE BILATERAL LOWER EXTREMITY DEEP VENOUS SYSTEMS FROM THE GROINS TO THE POPLITEAL FOSSA              Assessment/Plan:    70-year-old female with history of HTN, asthma, CKD3, GERD, gout, JARON, morbid obesity who presented with:    Acute hypoxic respiratory failure   - secondary to COVID-19 pneumonia  - requiring ICU, HFNC and precedex  - on IV Remdesivir, Decadron, baricitinib, SC Lovenox  - critical care and ID consulted    Hyponatremia   - improving    AG metabolic acidosis  - lactate was normal  - monitor    HTN  - continue home meds  - IV Hydralazine as needed    CKD3  - monitor renal function      Diet: ADULT DIET;  Regular; Safety Tray; Safety Tray (Disposables)    Code Status: DNR-CCA/DNI              Electronically signed by Darrion Sequeira MD on 10/28/2021 at 4:37 PM

## 2021-10-28 NOTE — PROGRESS NOTES
Baricitinib Daily Monitoring    Black Box Warnings:   Infections  Malignancy  Thrombosis  Tuberculosis  Precautions:   GI perforations  Hematologic toxicity  Hepatic effects   Hypersensitivity  Lipid abnormalities      Monitor for signs and symptoms of Black Box Warnings and precautions. AST   Date Value Ref Range Status   10/28/2021 47 (H) 0 - 35 U/L Final     ALT   Date Value Ref Range Status   10/28/2021 29 0 - 33 U/L Final     WBC   Date Value Ref Range Status   10/28/2021 2.8 (L) 4.8 - 10.8 K/uL Final     Hemoglobin   Date Value Ref Range Status   10/28/2021 13.5 12.0 - 16.0 g/dL Final     Platelets   Date Value Ref Range Status   10/28/2021 146 130 - 400 K/uL Final        Baricitinib is not recommended in patients with the following abnormal lab values:   Absolute lymphocyte count (ALC) <200 cells/µL (consider interruption until [facility] is ?200 cells/µL)  Absolute neutrophil count (ANC) <500 cells/µL (consider interruption until ANC is ?500 cells/µL)    Lymphocytes Absolute   Date Value Ref Range Status   10/28/2021 0.6 (L) 1.0 - 4.8 K/uL Final     Neutrophils Absolute   Date Value Ref Range Status   10/28/2021 2.1 1.4 - 6.5 K/uL Final       Dose is renally adjusted based on eGFR:  ?60 mL/min/1.73 m2: No dose adjustment  30 to <60 mL/min/1.73 m2: ?9 years - 2 mg once daily; 2 to <9 years  1 mg once daily  15 to <30 mL/min/1.73 m2: ?9 years  1 mg once daily; 2 to <9 years  Not recommended  <15 mL/min/1.73 m2: Not recommended    GFR Non-   Date Value Ref Range Status   10/28/2021 46.7 (L) >60 Final     Comment:     >60 mL/min/1.73m2 EGFR, calc. for ages 25 and older using the  MDRD formula (not corrected for weight), is valid for stable  renal function.        GFR    Date Value Ref Range Status   10/28/2021 56.5 (L) >60 Final     Comment:     >60 mL/min/1.73m2 EGFR, calc. for ages 25 and older using the  MDRD formula (not corrected for weight), is valid for stable  renal function. Renal function assessed and dose will be reduced to 2 mg for CrCl = 58 ml/min  Use of baricitinib is approved based on above labs and renal function. Patient given baricitinib patient medication guide.   10/28/21

## 2021-10-28 NOTE — PROGRESS NOTES
Physician Progress Note      PATIENT:               Nathaly Couch  CSN #:                  [id number]  :                       1945  ADMIT DATE:       10/27/2021 10:45 PM  [address] [place] DATE:  RESPONDING  PROVIDER #:        Leisa Ramos MD          QUERY TEXT:    Pt admitted with COVID-19 PNA, acute respiratory failure with hypoxia and   noted to have WBC 5.1  2.8 on 10/28/21  CRP 59.5  PCT 0.18. If possible,   please document in progress notes and discharge summary if you are evaluating   and/or treating: The medical record reflects the following:  Risk Factors: MO  JARON  CKD3  GERD  HTN  Clinical Indicators: WBC 5.1  2.8 on 10/28/21 CRP 59.5  PCT 0.18    Pulse 91    H&P: Acute hypoxic respiratory failure secondary to COVID-19 pneumonia  Treatment: CTA   CXR  ABG  IV Decadron  IV Remdesivir  IV Olumiant  ID    critical care    hTao Corona RN [email or web address]  [email or web address]  Options provided:  -- Sepsis not present on admission due to COVID-19 pneumonia  -- Covid-19 pneumonia without sepsis  -- Other - I will add my own diagnosis  -- Disagree - Not applicable / Not valid  -- Disagree - Clinically unable to determine / Unknown  -- Refer to Clinical Documentation Reviewer    PROVIDER RESPONSE TEXT:    This patient has Covid-19 pneumonia without sepsis.     Query created by: Arturo Valderrama on 10/28/2021 11:18 AM      Electronically signed by:  Leisa Ramos MD 10/28/2021 4:35 PM

## 2021-10-28 NOTE — PROGRESS NOTES
Discussed benefits of prone positioning, pt refusing at this time, pt requiring assistance to eat breakfast due to oxygen demands, personal items gathered for pt and placed on bedside table

## 2021-10-29 NOTE — PROGRESS NOTES
Hospitalist Progress Note      PCP: Bita Nino MD    Date of Admission: 10/27/2021    Chief Complaint:  no fevers,hypertensive, hypoxic on HFNC-60 liters, placed on BIPAP     Medications:  Reviewed    Infusion Medications    dexmedetomidine 0.7 mcg/kg/hr (10/29/21 0817)    sodium chloride 100 mL/hr at 10/29/21 0610     Scheduled Medications    valsartan  320 mg Oral Daily    pantoprazole  40 mg Oral QAM AC    pravastatin  40 mg Oral Nightly    carvedilol  6.25 mg Oral BID WC    citalopram  20 mg Oral Daily    colchicine  0.6 mg Oral Every Other Day    aspirin  81 mg Oral Daily    febuxostat  40 mg Oral Daily    fluticasone  2 spray Each Nostril Daily    remdesivir IVPB  100 mg IntraVENous Q24H    baricitinib  2 mg Oral Daily    enoxaparin  30 mg SubCUTAneous BID    dexamethasone  6 mg IntraVENous Q24H    guaiFENesin  600 mg Oral BID    albuterol-ipratropium  1 puff Inhalation Q6H     PRN Meds: morphine, metoprolol, hydrALAZINE **OR** hydrALAZINE, sennosides-docusate sodium      Intake/Output Summary (Last 24 hours) at 10/29/2021 0943  Last data filed at 10/29/2021 0551  Gross per 24 hour   Intake 4362.87 ml   Output 2525 ml   Net 1837.87 ml       Exam:    BP (!) 184/67   Pulse 91   Temp 99.3 °F (37.4 °C) (Bladder)   Resp (!) 37   Wt 293 lb 10.4 oz (133.2 kg)   SpO2 (!) 88%   BMI 48.87 kg/m²     General appearance: awake, on HFNC  Respiratory:  Diminished with crackles bilaterally . Cardiovascular: Regular rate and rhythm, S1/S2 . Abdomen: Soft, active bowel sounds. Musculoskeletal: No edema bilaterally.     Labs:   Recent Labs     10/27/21  1756 10/28/21  0503 10/29/21  0525   WBC 5.1 2.8* 10.2   HGB 13.2 13.5 13.8   HCT 38.1 39.5 42.0   * 146 219     Recent Labs     10/27/21  1756 10/28/21  0503 10/29/21  0525   * 132* 132*   K 4.4 4.1 5.0*   CL 94* 97 100   CO2 18* 18* 17*   BUN 19 18 19   CREATININE 1.25* 1.13* 0.89   CALCIUM 8.5 8.5 8.1*     Recent Labs

## 2021-10-29 NOTE — CARE COORDINATION
INTERDISCIPLINARY ROUNDING    October 29, 2021 at 0930 AM EDT    Anticipated Discharge Date: TBD based on response to treatments. Anticipated Discharge Disposition: TBD - Patient will likely require rehab/SNF at DC. Patient Mobility or PT/OT ordered: Not yet medically appropriate, patient remains biPAP-dependent. Readmission Risk              Risk of Unplanned Readmission:  18         Discussed patient goal for the day, patient clinical progression, and barriers to discharge. The following Goal(s) of the Day/Commitment(s) have been identified:  Outcomes Documented in Discipline-specific Documentation. Updates / Report:     - COVID-positive, receiving remdes, decadron, and Olumiant.   - Patient is on BiPAP, SPO2 89-91%. - Patient DNRCC-A - declines CPR and states no intubation.   -  met with patient this morning, healthcare decision maker chosen and documents completed. - Palliative care consult. - Patient continues to be anxious with precedex gtt infusing per orders.   - CMI still to be completed - patient unable to participate and no answer when family was called. - DC plan and needs TBD based on patient's response to treatments.     Darrel Hill RN  October 29, 2021

## 2021-10-29 NOTE — PROGRESS NOTES
Infectious Disease     Patient Name: Letha Souza  Date: 10/29/2021  YOB: 1945  Medical Record Number: [id number]             History of Present Illness: Had episodes of desaturation this a.m. no fevers currently on BiPAP        Physical Exam:     Blood pressure (!) 184/74, pulse 70, temperature 99.3 °F (37.4 °C), temperature source Bladder, resp. rate 29, weight 293 lb 10.4 oz (133.2 kg), SpO2 91 %, unknown if currently breastfeeding. General: Patient appears ok at the present time. Skin: no new rashes  HEENT:  Neck is supple, No subconjunctival hemorrhages, no oral exudates  Heart: S1 S2  Lungs: Diminished bases  Abdomen: soft, ND, NTTP,   Back :no CVA tenderness  Extrem: Some edema  Neuro exam: CN II-XII intact  Psych: cooperative    Labs: I have reviewed all lab results by electronic record, including most recent CBC, metabolic panel, and pertinent abnormalities were addressed from an infectious disease perspective. Trends are being monitored over time. Lab Results   Component Value Date    WBC 10.2 10/29/2021    HGB 13.8 10/29/2021    HCT 42.0 10/29/2021    MCV 99.5 10/29/2021     10/29/2021     Lab Results   Component Value Date     10/29/2021    K 5.0 10/29/2021     10/29/2021    CO2 17 10/29/2021    BUN 19 10/29/2021    CREATININE 0.89 10/29/2021    GLUCOSE 104 10/29/2021    GLUCOSE 108 05/18/2012    CALCIUM 8.1 10/29/2021        Radiology:  I have reviewed imaging results per electronic record and most pertinent abnormalities are being addressed from an infectious disease standpoint. ASSESSMENT:  COVID-19 pneumonia  Acute hypoxic respiratory failure  Chronic kidney disease    Patient very ill. She is on remdesivir Decadron. She is on baricitinib.  unFortunately patient on BiPAP now, difficult to take a lot of medication ,urine culture showing E. coli. Patient denied initial urinary symptoms.       PLAN:  Continue with current antimicrobial therapy. Follow kidney liver function  Procalcitonin less than 0.2 no other antibiotic indicated at this time for pneumonia, but now that she is on further immunomodulatory therapy would have low threshold to treat urine.   Sensitivities pending, add aztreonam given allergies  Give one dose of tocilizumab , stop baricitinib

## 2021-10-29 NOTE — CONSULTS
Infectious Disease     Patient Name: Luh Reed  Date: 10/29/2021  YOB: 1945  Medical Record Number: [id number]        No chief complaint on file. History of Present Illness: Patient presents with shortness of breath. Believes her illness started several days ago she was exposed to a neighbor. Progressively worse last few days. Requiring high levels of oxygen at this time she is in the ICU. She was not vaccinated.         Review of Systems: All 14 review of systems negative other than as stated above        Social History     Tobacco Use    Smoking status: Former Smoker     Quit date: 1974     Years since quittin.3    Smokeless tobacco: Never Used   Vaping Use    Vaping Use: Never used   Substance Use Topics    Alcohol use: No     Comment: quit     Drug use: No         Past Medical History:   Diagnosis Date    Allergic rhinitis 2013    Anxiety 2013    Asthma     Carpal tunnel syndrome 2015    Cellulitis of toe, right 2012    Cholelithiasis, common bile duct 1/10/2012    CKD (chronic kidney disease) stage 3, GFR 30-59 ml/min ([facility] [address] 2013    Depression     Dyslipidemia 2011    GERD (gastroesophageal reflux disease)     Gout 10/24/2011    HTN (hypertension) 10/24/2011    Hypertension     Irritable bowel syndrome 2011    Microscopic hematuria 2012    Midsternal chest pain     Migraine headache 2012    Multiple-type hyperlipidemia 2015    Muscle spasm 10/24/2011    Obesity, Class III, BMI 40-49.9 (morbid obesity) ([facility] [address] 2011    JARON (obstructive sleep apnea) 2013    Osteoarthritis     Other activity(E029.9)     sensative to vicryl sutures    Palpitations     Polyarthralgia 10/24/2011    Screening mammogram     Skin cancer 10/10/2016    Splenic mass 2012           Past Surgical History:   Procedure Laterality Date    BUNIONECTOMY      removal    CARDIOVASCULAR STRESS TEST PLUS) TABS Take 1 tablet by mouth daily.  aspirin 81 MG tablet Take 81 mg by mouth daily.  furosemide (LASIX) 20 MG tablet Take 1 tablet by mouth daily 10 tablet 0    polyethylene glycol (GLYCOLAX) 17 GM/SCOOP powder Use as directed for Miralax / Gatorade Bowel Prep Kit      levocetirizine (XYZAL) 5 MG tablet Take 1 tablet by mouth once daily.  Cholecalciferol 50 MCG (2000 UT) TABS Take 2,000 Units by mouth daily      bisacodyl (DULCOLAX) 5 MG EC tablet Use as directed for Miralax / Gatorade Bowel Prep Kit      valsartan-hydrochlorothiazide (DIOVAN-HCT) 320-25 MG per tablet Take 1 tablet by mouth daily. 90 tablet 3       Allergies   Allergen Reactions    Augmentin [Amoxicillin-Pot Clavulanate] Other (See Comments)     Lightheadedness and dizziness    Aspartame      welts    Atorvastatin Calcium     Carbapenems     Cephalexin Other (See Comments)     Mood, gi upset     Clonidine Derivatives     Diclofenac Sodium     Doxycycline Hyclate     Enalapril     Fexofenadine Hydrochloride     Flagyl [Metronidazole]     Imipenem     Levofloxacin Other (See Comments)     Nightmares     Lisinopril     Macrolides And Ketolides     Metronidazole Hcl     Nsaids     Oxaprozin     Oxycodone-Acetaminophen      Other reaction(s): Other: See Comments  Pt states she gets gout    Paroxetine     Sulfa Antibiotics     Tape Electa Ghazi Tape]     Tramadol Other (See Comments)         Family History   Problem Relation Age of Onset    Heart Disease Mother     Cancer Father         lung         Physical Exam:     Blood pressure 110/82, pulse 74, temperature 98.8 °F (37.1 °C), temperature source Bladder, resp. rate (!) 43, weight 291 lb 0.1 oz (132 kg), SpO2 92 %, unknown if currently breastfeeding. General: Patient appears ok at the present time.  NAD  Skin: no new rashes  HEENT:  Neck is supple, No subconjunctival hemorrhages, no oral exudates  Heart: S1 S2  Lungs: Diminished bases  Abdomen: soft, ND, NTTP,   Back :no CVA tenderness  Extrem: Some edema  Neuro exam: CN II-XII intact  Psych: cooperative    Labs: I have reviewed all lab results by electronic record, including most recent CBC, metabolic panel, and pertinent abnormalities were addressed from an infectious disease perspective. Trends are being monitored over time. Lab Results   Component Value Date    WBC 2.8 (L) 10/28/2021    HGB 13.5 10/28/2021    HCT 39.5 10/28/2021    MCV 98.1 10/28/2021     10/28/2021     Lab Results   Component Value Date     10/28/2021    K 4.1 10/28/2021    CL 97 10/28/2021    CO2 18 10/28/2021    BUN 18 10/28/2021    CREATININE 1.13 10/28/2021    GLUCOSE 156 10/28/2021    GLUCOSE 108 05/18/2012    CALCIUM 8.5 10/28/2021        Radiology:  I have reviewed imaging results per electronic record and most pertinent abnormalities are being addressed from an infectious disease standpoint. ASSESSMENT:  COVID-19 pneumonia  Acute hypoxic respiratory failure  Chronic kidney disease    Patient very ill. She is on remdesivir Decadron. She is being started on baricitinib. Previous risk factors risk benefits discussion was had, patient agreed to therapy      PLAN:  Continue with current antimicrobial therapy.   Follow kidney liver function  Procalcitonin less than 0.2 no other antibiotic indicated at this time

## 2021-10-29 NOTE — PROGRESS NOTES
Advance Care Planning     Advance Care Planning Inpatient Note  [facility] Department    Today's Date: 10/29/2021  Unit: [address]    Received request from [id number] [address] Ortiz Provider. Upon review of chart and communication with care team, patient's decision making abilities are not in question. . Patient was/were present in the room during visit. Goals of ACP Conversation:  Discuss advance care planning documents  Facilitate a discussion related to patient's goals of care as they align with the patient's values and beliefs. Health Care Decision Makers:       Primary Decision Maker: Kiarakim Clarence - Felician[place] - [phone]    Summary:  Completed [address] Decision [facility]    Advance Care Planning Documents (Patient Wishes):  Healthcare Power of /Advance Directive Appointment of Health Care Agent  Living Will/Advance Directive     Assessment:  Pt very anxious, weary. Some shock still present that she became so ill so quickly. Stated many times that she did not have any family. Feels that her landlord, Colt Zamora, will be a good advocate for her, however. Provided emotional support and encouragement, prayer. Completed new AD forms. Interventions:  Provided education on documents for clarity and greater understanding  Assisted in the completion of documents according to patient's wishes at this time    Care Preferences Communicated:   Resuscitation:  In the event the patient's heart stopped as a result of an underlying serious health condition, the patient communicates a preference for      a natural death (no CPR). Outcomes/Plan:  New advance directive completed.     Electronically signed by Capo Nagy, [phone] on 10/29/2021 at 10:24 AM

## 2021-10-29 NOTE — PROGRESS NOTES
Pulmonary & Critical Care Medicine ICU Progress Note    Subjective:     Overnight the patient did have some desaturation. She was noted to be tachypneic and hypoxic earlier this morning. She was placed on BiPAP. She is doing better from a tachypnea and hypoxia standpoint at this time. We did have discussion again regarding goals of care.     EXAM:  General: Mild distress secondary to increased work of breathing  HEENT: Normocephalic, atraumatic, BiPAP mask in place  Lungs : Crackles bilaterally, mild distress  Heart: Regular rate  ABD: Obese, positive bowel sounds, soft, nontender to palpation  Extremities : Warm, dry  Neuro: Awake, alert, oriented x4, moves all 4 extremities equally  Skin: No rashes    MV Settings:     / / /FiO2 : 100 %     Recent Labs     10/27/21  1857   PHART 7.364   [id number] 35   PO2ART 59*         IV:   dexmedetomidine 0.7 mcg/kg/hr (10/29/21 0817)    sodium chloride 100 mL/hr at 10/29/21 0610       Vitals:  BP (!) 184/67   Pulse 91   Temp 99.3 °F (37.4 °C) (Bladder)   Resp (!) 37   Wt 293 lb 10.4 oz (133.2 kg)   SpO2 (!) 88%   BMI 48.87 kg/m²          Intake/Output Summary (Last 24 hours) at 10/29/2021 0841  Last data filed at 10/29/2021 0551  Gross per 24 hour   Intake 4362.87 ml   Output 2525 ml   Net 1837.87 ml       Medications:  Scheduled Meds:   furosemide  20 mg IntraVENous Once    valsartan  320 mg Oral Daily    pantoprazole  40 mg Oral QAM AC    pravastatin  40 mg Oral Nightly    carvedilol  6.25 mg Oral BID WC    citalopram  20 mg Oral Daily    colchicine  0.6 mg Oral Every Other Day    aspirin  81 mg Oral Daily    febuxostat  40 mg Oral Daily    fluticasone  2 spray Each Nostril Daily    remdesivir IVPB  100 mg IntraVENous Q24H    baricitinib  2 mg Oral Daily    enoxaparin  30 mg SubCUTAneous BID    dexamethasone  6 mg IntraVENous Q24H    guaiFENesin  600 mg Oral BID    albuterol-ipratropium  1 puff Inhalation Q6H       Labs:   CBC:   Recent Labs 10/27/21  1756 10/28/21  0503 10/29/21  0525   WBC 5.1 2.8* 10.2   HGB 13.2 13.5 13.8   HCT 38.1 39.5 42.0   MCV 95.7* 98.1 99.5   * 146 219     BMP:   Recent Labs     10/27/21  1756 10/28/21  0503 10/29/21  0525   * 132* 132*   K 4.4 4.1 5.0*   CL 94* 97 100   CO2 18* 18* 17*   BUN 19 18 19   CREATININE 1.25* 1.13* 0.89     LIVER PROFILE:   Recent Labs     10/27/21  1756 10/28/21  0503 10/29/21  0525   AST 61* 47* 58*   ALT 30 29 30   BILITOT 0.7 0.4 0.5   ALKPHOS 50 51 49     PT/INR:   Recent Labs     10/28/21  0503   PROTIME 13.7   INR 1.1     APTT: No results for input(s): APTT in the last 72 hours. UA:  Recent Labs     10/27/21  2026   COLORU Yellow   PHUR 6.0   WBCUA 10-20*   RBCUA 5-10*   BACTERIA MODERATE*   CLARITYU Clear   SPECGRAV >=1.030   LEUKOCYTESUR Negative   UROBILINOGEN 0.2   BILIRUBINUR Negative   BLOODU Moderate   GLUCOSEU Negative       Radiology:    CXR: 2-view: Results for orders placed during the hospital encounter of 03/29/19    XR CHEST STANDARD (2 VW)    Narrative  EXAMINATION: XR CHEST (2 VW)    CLINICAL HISTORY: COUGH    COMPARISONS: JUNE 20, 2016    FINDINGS: Osseous structures intact. Cardiopericardial silhouette upper limits of normal and unchanged. Aorta calcified. Pulmonary vasculature normal. Ill-defined area of increased opacity left lower lung. Right lung clear. Impression  STABLE BORDERLINE CARDIOMEGALY. LEFT LOWER LUNG ATELECTASIS/PNEUMONIA. Results for orders placed during the hospital encounter of 06/20/16    XR Chest Standard TWO VW    Narrative  EXAMINATION RADIOGRAPHIC EXAM OF THE CHEST 2 VIEWS    CLINICAL HISTORY COUGH FOR ONE MONTH    COMPARISONS 3/26/14    FINDINGS There is mild cardiomegaly. Pulmonary vascularity is normal.  There are no infiltrates, masses, or effusions. Azygos lobe is noted. There are atherosclerotic changes of the thoracic aorta. There is mild  kyphosis and spondylosis. The bones appear demineralized.  No vertebral  fracture is evident. IMPRESSION MILD CARDIOMEGALY. NO ACUTE DISEASE. Carla Gold M.D. Released By- Issac Mina M.D. Released Date Time- 06/22/16 1427  This document has been electronically signed. ------------------------------------------------------------------------------              Portable: Results for orders placed during the hospital encounter of 10/27/21    XR CHEST PORTABLE    Narrative  Exam: XR CHEST PORTABLE    History: Hypoxia    Technique: AP portable view of the chest obtained. Comparison: Portal chest radiograph August 27, 2021    Findings:    Atherosclerotic calcification of the thoracic aorta. The cardiomediastinal silhouette is within normal limits. Bilateral patchy airspace opacities most significantly involving the left lung base. No pneumothorax or pleural effusion. No acute osseous  normality. Impression  Bilateral pneumonia. Assessment/Plan:    1. Acute hypoxic respiratory failure-this secondary to COVID-19 pneumonitis. At this time she does remain a DNR CCA with no intubation. I did have a discussion with the patient again. She did have some questions regarding ventilatory support. At this time she does not have a POA. I did discuss with the  to see if we could help the patient establish power of . She will see the patient today. Have also asked palliative medicine to evaluate the patient to assist with discussion in regards to goals of care as well. She is currently receiving a Precedex drip given some increased agitation when she first arrived in the ICU the other day. She will continue with BiPAP at this point as her oxygenation has improved somewhat. I did also add as needed morphine to help with sensation of dyspnea. 2. COVID-19 pneumonitis-she is currently receiving Decadron, remdesivir, and baricitinib. She was evaluated by infectious disease yesterday. We will continue with current supportive care. We discussed the importance of lying in the prone position if able. She states she does not feel she would be able to tolerate this but is agreeable to trying to lie on her side. 3. Asthma-at this time she does not have any signs or symptoms consistent with an asthma exacerbation. She does continue on Decadron for treatment of her COVID-19. She is also currently receiving Combivent. 4. Hypertension-she does have a history of hypertension. She is receiving her scheduled Coreg and Diovan. As needed medications are in place and continue to be given as needed. Nutrition: Oral diet as tolerated, however this is currently on hold given that she is on the BiPAP    Prophylaxis: Lovenox for DVT prophylaxis. Protonix for GI prophylaxis. Code Status: DNR CCA/DNI. Palliative medicine will be consulted today. This is a 68 y.o. female who is critically ill secondary to acute hypoxic respiratory failure. I have spent a total of 35 minutes of critical care time with this patient, review of the chart, and discussion with the bedside staff; excluding any billable procedures.     Electronically signed by Sonja Baptiste DO, on 10/29/2021 at 8:41 AM

## 2021-10-30 NOTE — PROGRESS NOTES
Infectious Diseases Inpatient Progress Note          HISTORY OF PRESENT ILLNESS:  Follow up critical COVID-19 pneumonia with acute respiratory failure and hypoxia, E. coli UTI, status post Actemra, on IV remdesivir and Azactam.  Remains to be severely hypoxic, in ICU, on BiPAP refusing to be intubated positive shortness of breath and generalized weakness. Limited    Current Medications:     aztreonam  1,000 mg IntraVENous Q8H    valsartan  320 mg Oral Daily    pantoprazole  40 mg Oral QAM AC    pravastatin  40 mg Oral Nightly    carvedilol  6.25 mg Oral BID WC    citalopram  20 mg Oral Daily    colchicine  0.6 mg Oral Every Other Day    aspirin  81 mg Oral Daily    febuxostat  40 mg Oral Daily    fluticasone  2 spray Each Nostril Daily    remdesivir IVPB  100 mg IntraVENous Q24H    enoxaparin  30 mg SubCUTAneous BID    dexamethasone  6 mg IntraVENous Q24H    guaiFENesin  600 mg Oral BID    albuterol-ipratropium  1 puff Inhalation Q6H       Allergies:  Augmentin [amoxicillin-pot clavulanate], Aspartame, Atorvastatin calcium, Carbapenems, Cephalexin, Clonidine derivatives, Diclofenac sodium, Doxycycline hyclate, Enalapril, Fexofenadine hydrochloride, Flagyl [metronidazole], Imipenem, Levofloxacin, Lisinopril, Macrolides and ketolides, Metronidazole hcl, Nsaids, Oxaprozin, Oxycodone-acetaminophen, Paroxetine, Sulfa antibiotics, Tape [adhesive tape], and Tramadol      Review of Systems  Limited BiPAP 14 system review is negative other than HPI/     Physical Exam  Vitals:    10/30/21 1100 10/30/21 1200 10/30/21 1300 10/30/21 1400   BP: 94/60 (!) 118/38 (!) 169/71 (!) 206/74   Pulse: 70 73 69 68   Resp: 24 18 30 29   Temp:  97 °F (36.1 °C)     TempSrc:  Bladder     SpO2: 94% 94% 96% 93%   Weight:         General Appearance: alert and oriented to person, place and time, well-developed and well-nourished, in no acute distress  Skin: warm and dry, no rash.    Head: normocephalic and atraumatic  Eyes: anicteric sclerae  ENT: oropharynx clear and moist with normal mucous membranes. No oral thrush  Lungs: normal respiratory effort, bilateral fine rales with diminished breath sounds bilateral lung fields bilateral end expiratory wheezes   Heart normal S1-S2 no murmur  Abdomen: soft, no tenderness, obese with large pannus   good urine output  No leg edema  No erythema, no tenderness      DATA:    Lab Results   Component Value Date    WBC 5.4 10/30/2021    HGB 13.6 10/30/2021    HCT 40.4 10/30/2021    MCV 96.5 10/30/2021     10/30/2021     Lab Results   Component Value Date    CREATININE 0.82 10/30/2021    BUN 22 10/30/2021     (L) 10/30/2021    K 4.0 10/30/2021     10/30/2021    CO2 20 10/30/2021       Hepatic Function Panel:  Lab Results   Component Value Date    ALKPHOS 55 10/30/2021    ALT 24 10/30/2021    AST 38 10/30/2021    PROT 5.5 10/30/2021    BILITOT 0.6 10/30/2021    BILIDIR 0.3 01/11/2012    IBILI 0.5 01/11/2012    LABALBU 2.9 10/30/2021    LABALBU 4.2 05/18/2012       Microbiology:   Recent Labs     10/27/21  1758   BC No Growth to date. Any change in status will be called. Recent Labs     10/27/21  1758   BLOODCULT2 No Growth to date. Any change in status will be called. Recent Labs     10/27/21  2026   LABURIN >100,000 CFU/ml   Susceptibility    Escherichia coli (1)    Antibiotic Interpretation MITZI Status   amoxicillin-clavulanate Sensitive 4 mcg/mL    ampicillin Resistant >=32 mcg/mL    ceFAZolin Sensitive <=4 mcg/mL    ciprofloxacin Sensitive <=0.25 mcg/mL    gentamicin Sensitive <=1 mcg/mL    nitrofurantoin Sensitive <=16 mcg/mL    trimethoprim-sulfamethoxazole Sensitive <=20 mcg/mL    Lab and Collection  10/29/2021  8:10 AM - Mello, Chpo Incoming Lab Results From Soft    Component Value Ref Range & Units Status Collected Lab   D-Dimer, Quant 1. 59High Panic   0.00 - 0.50 mg/L FEU Final 10/29/2021        10/29/2021  6:14 AM - Vicki Sarkar Incoming Lab Results From Soft    Component Value Ref Range & Units Status Collected Lab   CRP 33.0High   0.0 - 5.0 mg/L Final 10/29/2021  5:25 AM [facility]   Testing Performed By          IMPRESSION:    · Critical COVID-19 pneumonia  · Acute respiratory failure with hypoxia   · Hypercoagulable state  · Acute lower UTI with E. coli    Patient Active Problem List   Diagnosis    Polyarthralgia    HTN (hypertension)    Gout    GERD (gastroesophageal reflux disease)    Dyslipidemia    Irritable bowel syndrome    Obesity, Class III, BMI 40-49.9 (morbid obesity) ([facility])    Migraine headache    Microscopic hematuria    Splenic mass    Allergic rhinitis    CKD (chronic kidney disease) stage 3, GFR 30-59 ml/min ([facility])    Asthma    JARON (obstructive sleep apnea)    Anxiety    OA (osteoarthritis) of knee    Midsternal chest pain    Palpitation    Bunion    Carpal tunnel syndrome    Chronic maxillary sinusitis    Melancholia    Ganglion of tendon sheath    Glaucoma suspect    Acquired hallux rigidus    Hypermetropia    Lateral epicondylitis    Multiple-type hyperlipidemia    Nuclear senile cataract    Primary localized osteoarthrosis of ankle and foot    Sensory hearing loss, bilateral    Swelling, mass, or lump in head and neck    Menopausal symptom    Tear film insufficiency    Skin cancer    Pneumonia    Moderate persistent asthma, uncomplicated    [id number]       PLAN:  · IV remdesivir for 5 days, start date 1028  · IV Azactam for 1 week  · Status post Actemra 800 mg  · Oxygen support and weaning as tolerated  · Follow-up CBC complete metabolic profile  · Low-dose Lasix    Discussed with patient    Rodrigo Aldridge MD

## 2021-10-30 NOTE — PROGRESS NOTES
Spiritual Care Services     Summary of Visit:  Provided prayer and much reassurance to the pt. Phone call to her POA to answer questions and verify her willingness to act in this capacity yesterday after completion of documents. Spiritual Assessment/Intervention/Outcomes:    Encounter Summary  Services provided to[de-identified] Patient  Referral/Consult From[de-identified] Patient  Support System: Friends/neighbors  Continue Visiting: Yes  Complexity of Encounter: Moderate  Length of Encounter: 15 minutes  Spiritual Assessment Completed: Yes  Advance Care Planning: Yes  Routine  Type: Follow up     Spiritual/[place]ism  Type: Spiritual support  Assessment: Approachable  Intervention: Prayer, Sustaining presence/ Ministry of presence  Outcome: Coping, Receptive                         Care Plan:    Open to ongoing care, sob with any speech    Spiritual Care Services   Electronically signed by Avril Ayers on 10/30/21 at 10:27 AM EDT     To reach a  for emotional and spiritual support, place an [facility] consult request.   If a  is needed immediately, dial 0 and ask to page the on-call .

## 2021-10-30 NOTE — PROGRESS NOTES
Day 3  Patient Active Problem List   Diagnosis    Polyarthralgia    HTN (hypertension)    Gout    GERD (gastroesophageal reflux disease)    Dyslipidemia    Irritable bowel syndrome    Obesity, Class III, BMI 40-49.9 (morbid obesity) ([facility])    Migraine headache    Microscopic hematuria    Splenic mass    Allergic rhinitis    CKD (chronic kidney disease) stage 3, GFR 30-59 ml/min ([facility])    Asthma    JARON (obstructive sleep apnea)    Anxiety    OA (osteoarthritis) of knee    Midsternal chest pain    Palpitation    Bunion    Carpal tunnel syndrome    Chronic maxillary sinusitis    Melancholia    Ganglion of tendon sheath    Glaucoma suspect    Acquired hallux rigidus    Hypermetropia    Lateral epicondylitis    Multiple-type hyperlipidemia    Nuclear senile cataract    Primary localized osteoarthrosis of ankle and foot    Sensory hearing loss, bilateral    Swelling, mass, or lump in head and neck    Menopausal symptom    Tear film insufficiency    Skin cancer    Pneumonia    Moderate persistent asthma, uncomplicated    [id number]       Allergies: Augmentin [amoxicillin-pot clavulanate], Aspartame, Atorvastatin calcium, Carbapenems, Cephalexin, Clonidine derivatives, Diclofenac sodium, Doxycycline hyclate, Enalapril, Fexofenadine hydrochloride, Flagyl [metronidazole], Imipenem, Levofloxacin, Lisinopril, Macrolides and ketolides, Metronidazole hcl, Nsaids, Oxaprozin, Oxycodone-acetaminophen, Paroxetine, Sulfa antibiotics, Tape [adhesive tape], and Tramadol  Hematologic toxicity: Hematologic toxicity, including lymphopenia, anemia, and neutropenia, may occur and is generally reversible and managed by treatment interruption. Do not initiate therapy in patients with an absolute lymphocyte count <500 cells/mm3, absolute neutrophil count <1,000 cells/mm3, or hemoglobin <8 g/dL. Monitor complete blood counts at baseline and periodically thereafter. Hepatic effects:  Increased incidence of liver enzyme elevation (?5 × ULN for ALT 202 130 - 400 K/uL    Neutrophils % 79.5 %    Lymphocytes % 11.2 %    Monocytes % 9.1 %    Eosinophils % 0.1 %    Basophils % 0.1 %    Neutrophils Absolute 4.3 1.4 - 6.5 K/uL    Lymphocytes Absolute 0.6 (L) 1.0 - 4.8 K/uL    Monocytes Absolute 0.5 0.2 - 0.8 K/uL    Eosinophils Absolute 0.0 0.0 - 0.7 K/uL    Basophils Absolute 0.0 0.0 - 0.2 K/uL   Comprehensive Metabolic Panel w/ Reflex to MG    Collection Time: 10/30/21  5:25 AM   Result Value Ref Range    Sodium 134 (L) 135 - 144 mEq/L    Potassium reflex Magnesium 4.0 3.4 - 4.9 mEq/L    Chloride 101 95 - 107 mEq/L    CO2 20 20 - 31 mEq/L    Anion Gap 13 9 - 15 mEq/L    Glucose 112 (H) 70 - 99 mg/dL    BUN 22 8 - 23 mg/dL    CREATININE 0.82 0.50 - 0.90 mg/dL    GFR Non-African American >60.0 >60    GFR  >60.0 >60    Calcium 8.3 (L) 8.5 - 9.9 mg/dL    Total Protein 5.5 (L) 6.3 - 8.0 g/dL    Albumin 2.9 (L) 3.5 - 4.6 g/dL    Total Bilirubin 0.6 0.2 - 0.7 mg/dL    Alkaline Phosphatase 55 40 - 130 U/L    ALT 24 0 - 33 U/L    AST 38 (H) 0 - 35 U/L    Globulin 2.6 2.3 - 3.5 g/dL     CrCl of 81 mL/min allows for increase of baricitinib to 4 mg if desired.

## 2021-10-30 NOTE — PROGRESS NOTES
Pulmonary & Critical Care Medicine ICU Progress Note    Subjective:     No overnight events noted. She does remain on BiPAP. She was tried on heated high flow nasal cannula with a nonrebreather this morning, however had desaturation.     EXAM:  General: Sleeping when I entered the room with no distress  HEENT: Normocephalic, atraumatic, BiPAP mask in place  Lungs : Crackles bilaterally, mild distress  Heart: Regular rate  ABD: Obese, positive bowel sounds, soft, nontender to palpation  Extremities : Warm, dry  Neuro: Awake, alert, oriented x4, moves all 4 extremities equally  Skin: No rashes    MV Settings:     / / /FiO2 : 90 %     Recent Labs     10/27/21  1857   PHART 7.364   SXQ4MES 35   PO2ART 59*         IV:   dexmedetomidine 1.4 mcg/kg/hr (10/30/21 0944)    sodium chloride 100 mL/hr at 10/29/21 1511       Vitals:  BP (!) 158/73   Pulse 71   Temp 96.6 °F (35.9 °C) (Bladder)   Resp 20   Wt 293 lb 10.4 oz (133.2 kg)   SpO2 (!) 87%   BMI 48.87 kg/m²          Intake/Output Summary (Last 24 hours) at 10/30/2021 1033  Last data filed at 10/30/2021 0800  Gross per 24 hour   Intake 3799.72 ml   Output 4550 ml   Net -750.28 ml       Medications:  Scheduled Meds:   aztreonam  1,000 mg IntraVENous Q8H    valsartan  320 mg Oral Daily    pantoprazole  40 mg Oral QAM AC    pravastatin  40 mg Oral Nightly    carvedilol  6.25 mg Oral BID WC    citalopram  20 mg Oral Daily    colchicine  0.6 mg Oral Every Other Day    aspirin  81 mg Oral Daily    febuxostat  40 mg Oral Daily    fluticasone  2 spray Each Nostril Daily    remdesivir IVPB  100 mg IntraVENous Q24H    enoxaparin  30 mg SubCUTAneous BID    dexamethasone  6 mg IntraVENous Q24H    guaiFENesin  600 mg Oral BID    albuterol-ipratropium  1 puff Inhalation Q6H       Labs:   CBC:   Recent Labs     10/28/21  0503 10/29/21  0525 10/30/21  0525   WBC 2.8* 10.2 5.4   HGB 13.5 13.8 13.6   HCT 39.5 42.0 40.4   MCV 98.1 99.5 96.5    219 202     BMP: Recent Labs     10/28/21  0503 10/29/21  0525 10/30/21  0525   * 132* 134*   K 4.1 5.0* 4.0   CL 97 100 101   CO2 18* 17* 20   BUN 18 19 22   CREATININE 1.13* 0.89 0.82     LIVER PROFILE:   Recent Labs     10/28/21  0503 10/29/21  0525 10/30/21  0525   AST 47* 58* 38*   ALT 29 30 24   BILITOT 0.4 0.5 0.6   ALKPHOS 51 49 55     PT/INR:   Recent Labs     10/28/21  0503   PROTIME 13.7   INR 1.1     APTT: No results for input(s): APTT in the last 72 hours. UA:  Recent Labs     10/27/21  2026   COLORU Yellow   PHUR 6.0   WBCUA 10-20*   RBCUA 5-10*   BACTERIA MODERATE*   CLARITYU Clear   SPECGRAV >=1.030   LEUKOCYTESUR Negative   UROBILINOGEN 0.2   BILIRUBINUR Negative   BLOODU Moderate   GLUCOSEU Negative       Radiology:    CXR: 2-view: Results for orders placed during the hospital encounter of 03/29/19    XR CHEST STANDARD (2 VW)    Narrative  EXAMINATION: XR CHEST (2 VW)    CLINICAL HISTORY: COUGH    COMPARISONS: JUNE 20, 2016    FINDINGS: Osseous structures intact. Cardiopericardial silhouette upper limits of normal and unchanged. Aorta calcified. Pulmonary vasculature normal. Ill-defined area of increased opacity left lower lung. Right lung clear. Impression  STABLE BORDERLINE CARDIOMEGALY. LEFT LOWER LUNG ATELECTASIS/PNEUMONIA. Results for orders placed during the hospital encounter of 06/20/16    XR Chest Standard TWO VW    Narrative  EXAMINATION RADIOGRAPHIC EXAM OF THE CHEST 2 VIEWS    CLINICAL HISTORY COUGH FOR ONE MONTH    COMPARISONS 3/26/14    FINDINGS There is mild cardiomegaly. Pulmonary vascularity is normal.  There are no infiltrates, masses, or effusions. Azygos lobe is noted. There are atherosclerotic changes of the thoracic aorta. There is mild  kyphosis and spondylosis. The bones appear demineralized. No vertebral  fracture is evident. IMPRESSION MILD CARDIOMEGALY. NO ACUTE DISEASE. Joanie Flood M.D.   Released ByAni Clark M.D.  Released Date Time- 06/22/16 1427  This document has been electronically signed. ------------------------------------------------------------------------------              Portable: Results for orders placed during the hospital encounter of 10/27/21    XR CHEST PORTABLE    Narrative  Exam: XR CHEST PORTABLE    History: Hypoxia    Technique: AP portable view of the chest obtained. Comparison: Portal chest radiograph August 27, 2021    Findings:    Atherosclerotic calcification of the thoracic aorta. The cardiomediastinal silhouette is within normal limits. Bilateral patchy airspace opacities most significantly involving the left lung base. No pneumothorax or pleural effusion. No acute osseous  normality. Impression  Bilateral pneumonia. Assessment/Plan:    1. Acute hypoxic respiratory failure-this secondary to COVID-19 pneumonitis. At this time she does remain a DNR CCA with no intubation. She does continue on Precedex drip given some initial agitation. She is also receiving as needed morphine for dyspnea as well as pain. She was able to establish POA yesterday with the . She is not able to tolerate prone positioning. She does also have a difficult time lying on her side. She was tried on heated high flow nasal cannula today, however this was not successful given desaturation. 2. COVID-19 pneumonitis-she is currently receiving Decadron, remdesivir, and baricitinib. She was evaluated by infectious disease yesterday. We will continue with current supportive care. Continue prone positioning encouragement as able. 3. Asthma-at this time she does not have any signs or symptoms consistent with an asthma exacerbation. She does continue on Decadron for treatment of her COVID-19. She is also currently receiving Combivent. 4. Hypertension-she does have a history of hypertension. She is receiving her scheduled Coreg and Diovan.   As needed medications are in place and continue to be given as needed. Nutrition: Oral diet as tolerated, however this is currently on hold given that she is on the BiPAP    Prophylaxis: Lovenox for DVT prophylaxis. Protonix for GI prophylaxis. Code Status: DNR CCA/DNI. Palliative medicine was consulted on Friday, however have not seen the patient as of yet. This is a 68 y.o. female who is critically ill secondary to acute hypoxic respiratory failure. I have spent a total of 34 minutes of critical care time with this patient, review of the chart, and discussion with the bedside staff; excluding any billable procedures.     Electronically signed by Heidi Amaya DO, on 10/30/2021 at 10:33 AM

## 2021-10-30 NOTE — PROGRESS NOTES
Hospitalist Progress Note      PCP: Jenna Ruiz MD    Date of Admission: 10/27/2021    Chief Complaint:  no fevers, hypertensive, remains hypoxic requiring HFNC / BIPAP support    Medications:  Reviewed    Infusion Medications    dexmedetomidine 1.4 mcg/kg/hr (10/30/21 0944)    sodium chloride 100 mL/hr at 10/29/21 1511     Scheduled Medications    aztreonam  1,000 mg IntraVENous Q8H    valsartan  320 mg Oral Daily    pantoprazole  40 mg Oral QAM AC    pravastatin  40 mg Oral Nightly    carvedilol  6.25 mg Oral BID WC    citalopram  20 mg Oral Daily    colchicine  0.6 mg Oral Every Other Day    aspirin  81 mg Oral Daily    febuxostat  40 mg Oral Daily    fluticasone  2 spray Each Nostril Daily    remdesivir IVPB  100 mg IntraVENous Q24H    enoxaparin  30 mg SubCUTAneous BID    dexamethasone  6 mg IntraVENous Q24H    guaiFENesin  600 mg Oral BID    albuterol-ipratropium  1 puff Inhalation Q6H     PRN Meds: morphine, metoprolol, hydrALAZINE **OR** hydrALAZINE, sennosides-docusate sodium      Intake/Output Summary (Last 24 hours) at 10/30/2021 0957  Last data filed at 10/30/2021 0800  Gross per 24 hour   Intake 3799.72 ml   Output 4550 ml   Net -750.28 ml       Exam:    BP (!) 158/73   Pulse 71   Temp 96.6 °F (35.9 °C) (Bladder)   Resp 20   Wt 293 lb 10.4 oz (133.2 kg)   SpO2 (!) 87%   BMI 48.87 kg/m²     General appearance: awake, on BIPAP  Respiratory:  Diminished with crackles bilaterally . Cardiovascular: Regular rate and rhythm, S1/S2 . Abdomen: Soft, active bowel sounds. Musculoskeletal: No edema bilaterally.     Labs:   Recent Labs     10/28/21  0503 10/29/21  0525 10/30/21  0525   WBC 2.8* 10.2 5.4   HGB 13.5 13.8 13.6   HCT 39.5 42.0 40.4    219 202     Recent Labs     10/28/21  0503 10/29/21  0525 10/30/21  0525   * 132* 134*   K 4.1 5.0* 4.0   CL 97 100 101   CO2 18* 17* 20   BUN 18 19 22   CREATININE 1.13* 0.89 0.82   CALCIUM 8.5 8.1* 8.3*     Recent Labs 10/28/21  0503 10/29/21  0525 10/30/21  0525   AST 47* 58* 38*   ALT 29 30 24   BILITOT 0.4 0.5 0.6   ALKPHOS 51 49 55     Recent Labs     10/28/21  0503   INR 1.1     Recent Labs     10/27/21  1756   TROPONINI <0.010       Urinalysis:      Lab Results   Component Value Date    NITRU Negative 10/27/2021    WBCUA 10-20 10/27/2021    BACTERIA MODERATE 10/27/2021    RBCUA 5-10 10/27/2021    BLOODU Moderate 10/27/2021    SPECGRAV >=1.030 10/27/2021    GLUCOSEU Negative 10/27/2021    GLUCOSEU NEG 12/17/2011       Radiology:  US DUP UPPER EXTREMITY LEFT VENOUS   Final Result   NO SONOGRAPHIC EVIDENCE, DEEP VEIN THROMBOSIS, LEFT UPPER EXTREMITY, WITH ABOVE LIMITATIONS. US DUP UPPER EXTREMITY RIGHT VENOUS   Final Result   NO SONOGRAPHIC EVIDENCE , DEEP VEIN THROMBOSIS, RIGHT UPPER EXTREMITY. US DUP LOWER EXTREMITIES BILATERAL VENOUS   Final Result   NO ULTRASOUND SIGNS OF THROMBUS IN THE BILATERAL LOWER EXTREMITY DEEP VENOUS SYSTEMS FROM THE GROINS TO THE POPLITEAL FOSSA              Assessment/Plan:    43-year-old female with history of HTN, asthma, CKD3, GERD, gout, JARON, morbid obesity who presented with:    Acute hypoxic respiratory failure   - secondary to COVID-19 pneumonia  - requiring ICU, HFNC/BIPAP and precedex  - on IV Remdesivir, Decadron, SC Lovenox  - s/p actemra  - baricitinib stopped  - management per critical care and ID    E. Coli UTI  - on Aztreonam per ID    Hyponatremia   - improving    AG metabolic acidosis  - lactate was normal  - monitor    HTN  - continue home meds  - IV Hydralazine as needed    CKD3  - monitor renal function      Diet: ADULT DIET;  Regular; Safety Tray; Safety Tray (Disposables)    Code Status: DNR-CCA/DNI              Electronically signed by Paulino Barton MD on 10/30/2021 at 9:57 AM

## 2021-10-31 NOTE — PROGRESS NOTES
Called to bedside to discuss code status at request of patient. She states that she is not ready to die yet and would like to be put on a ventilator if necessary to keep her alive, also would like CPR if cardiac arrest occurs.  Code status chagned

## 2021-10-31 NOTE — PROGRESS NOTES
Pulmonary & Critical Care Medicine ICU Progress Note    Subjective:     Overnight she did request to speak with the physician in regards to her CODE STATUS. She did change her self so full code. She does remain on BiPAP at this time. She did have developed this morning of some increased anxiety. She has now resting comfortably in bed.     EXAM:  General: No acute distress, resting comfortably in bed  HEENT: Normocephalic, atraumatic, BiPAP mask in place  Lungs : Crackles bilaterally, mild distress  Heart: Regular rate  ABD: Obese, positive bowel sounds, soft, nontender to palpation  Extremities : Warm, dry  Neuro: Awake, alert, oriented x4, moves all 4 extremities equally  Skin: No rashes    MV Settings:     / / /FiO2 : 100 %     Recent Labs     10/31/21  0413   PHART 7.465*   GBY2SRD 30*   PO2ART 85*         IV:   dexmedetomidine 1.4 mcg/kg/hr (10/31/21 1315)       Vitals:  BP (!) 125/52   Pulse 66   Temp 98.6 °F (37 °C) (Oral)   Resp 24   Wt 293 lb 10.4 oz (133.2 kg)   SpO2 96%   BMI 48.87 kg/m²          Intake/Output Summary (Last 24 hours) at 10/31/2021 0927  Last data filed at 10/31/2021 0800  Gross per 24 hour   Intake 2284.16 ml   Output 4100 ml   Net -1815.84 ml       Medications:  Scheduled Meds:   furosemide  40 mg IntraVENous Daily    carvedilol  12.5 mg Oral BID WC    aztreonam  1,000 mg IntraVENous Q8H    valsartan  320 mg Oral Daily    pantoprazole  40 mg Oral QAM AC    pravastatin  40 mg Oral Nightly    citalopram  20 mg Oral Daily    colchicine  0.6 mg Oral Every Other Day    aspirin  81 mg Oral Daily    febuxostat  40 mg Oral Daily    fluticasone  2 spray Each Nostril Daily    remdesivir IVPB  100 mg IntraVENous Q24H    enoxaparin  30 mg SubCUTAneous BID    dexamethasone  6 mg IntraVENous Q24H    guaiFENesin  600 mg Oral BID    albuterol-ipratropium  1 puff Inhalation Q6H       Labs:   CBC:   Recent Labs     10/29/21  0525 10/29/21  0525 10/30/21  0525 10/31/21  0413 10/31/21  0514   WBC 10.2  --  5.4  --  8.0   HGB 13.8   < > 13.6 14.5 14.3   HCT 42.0  --  40.4  --  41.3   MCV 99.5  --  96.5  --  96.2     --  202  --  240    < > = values in this interval not displayed. BMP:   Recent Labs     10/29/21  0525 10/29/21  0525 10/30/21  0525 10/31/21  0413 10/31/21  0514   *  --  134*  --  133*   K 5.0*  --  4.0  --  3.5     --  101  --  98   CO2 17*  --  20  --  20   BUN 19  --  22  --  30*   CREATININE 0.89   < > 0.82 1.0 0.93*    < > = values in this interval not displayed. LIVER PROFILE:   Recent Labs     10/29/21  0525 10/30/21  0525 10/31/21  0514   AST 58* 38* 44*   ALT 30 24 28   BILITOT 0.5 0.6 0.7   ALKPHOS 49 55 66     PT/INR:   No results for input(s): PROTIME, INR in the last 72 hours. APTT: No results for input(s): APTT in the last 72 hours. UA:  No results for input(s): NITRITE, COLORU, PHUR, LABCAST, WBCUA, RBCUA, MUCUS, TRICHOMONAS, YEAST, BACTERIA, CLARITYU, SPECGRAV, LEUKOCYTESUR, UROBILINOGEN, BILIRUBINUR, BLOODU, GLUCOSEU, AMORPHOUS in the last 72 hours. Invalid input(s): Ravi Distance    Radiology:    CXR: 2-view: Results for orders placed during the hospital encounter of 03/29/19    XR CHEST STANDARD (2 VW)    Narrative  EXAMINATION: XR CHEST (2 VW)    CLINICAL HISTORY: COUGH    COMPARISONS: JUNE 20, 2016    FINDINGS: Osseous structures intact. Cardiopericardial silhouette upper limits of normal and unchanged. Aorta calcified. Pulmonary vasculature normal. Ill-defined area of increased opacity left lower lung. Right lung clear. Impression  STABLE BORDERLINE CARDIOMEGALY. LEFT LOWER LUNG ATELECTASIS/PNEUMONIA. Results for orders placed during the hospital encounter of 06/20/16    XR Chest Standard TWO VW    Narrative  EXAMINATION RADIOGRAPHIC EXAM OF THE CHEST 2 VIEWS    CLINICAL HISTORY COUGH FOR ONE MONTH    COMPARISONS 3/26/14    FINDINGS There is mild cardiomegaly.  Pulmonary vascularity is normal.  There are no infiltrates, masses, or effusions. Azygos lobe is noted. There are atherosclerotic changes of the thoracic aorta. There is mild  kyphosis and spondylosis. The bones appear demineralized. No vertebral  fracture is evident. IMPRESSION MILD CARDIOMEGALY. NO ACUTE DISEASE. Yves Adelaidemahoganyallegra Kwasi Pinedo M.D. Released By- Helen Lynn M.D. Released Date Time- 06/22/16 1427  This document has been electronically signed. ------------------------------------------------------------------------------              Portable: Results for orders placed during the hospital encounter of 10/27/21    XR CHEST PORTABLE    Narrative  Exam: XR CHEST PORTABLE    History: Hypoxia    Technique: AP portable view of the chest obtained. Comparison: Portal chest radiograph August 27, 2021    Findings:    Atherosclerotic calcification of the thoracic aorta. The cardiomediastinal silhouette is within normal limits. Bilateral patchy airspace opacities most significantly involving the left lung base. No pneumothorax or pleural effusion. No acute osseous  normality. Impression  Bilateral pneumonia. Assessment/Plan:    1. Acute hypoxic respiratory failure-this secondary to COVID-19 pneumonitis. She does continue on Precedex drip given some initial agitation. She is also receiving as needed morphine for dyspnea as well as pain. She was able to establish POA with the . She did initially request to be a DNR CCA/DNI, however she has reconsidered and she is now full code. She is not able to tolerate prone positioning. She does also have a difficult time lying on her side. She is currently on BiPAP. She will continue with current supportive care and we will wean down her FiO2 as able. She can be tried on heated high flow nasal cannula later if she continues to do well. 2. COVID-19 pneumonitis-she is currently receiving Decadron and remdesivir.   She was started on baricitinib, however did receive a dose of Actemra and therefore her baricitinib was stopped. ID does continue to follow. We will continue with current supportive care. Continue prone positioning encouragement as able. She is also currently receiving IV Azactam for 1 week. 3. Asthma-at this time she does not have any signs or symptoms consistent with an asthma exacerbation. She does continue on Decadron for treatment of her COVID-19. She is also currently receiving Combivent. 4. Hypertension-she does have a history of hypertension. She is receiving her scheduled Coreg and Diovan. As needed medications are in place and continue to be given as needed. Nutrition: Oral diet as tolerated, however this is currently on hold given that she is on the BiPAP    Prophylaxis: Lovenox for DVT prophylaxis. Protonix for GI prophylaxis. Code Status: She was initially a DNR CCA/DNI, however the patient did reconsider last evening and is now full code. Palliative medicine was consulted on Friday. They will hopefully be able to evaluate the patient tomorrow. This is a 68 y.o. female who is critically ill secondary to acute hypoxic respiratory failure. I have spent a total of 33 minutes of critical care time with this patient, review of the chart, and discussion with the bedside staff; excluding any billable procedures.     Electronically signed by Magdalena Ibarra DO, on 10/31/2021 at 9:27 AM

## 2021-10-31 NOTE — PROGRESS NOTES
Hospitalist Progress Note      PCP: Adwoa Spivey MD    Date of Admission: 10/27/2021    Chief Complaint:  no fevers, hypertensive, remains hypoxic requiring BIPAP support    Medications:  Reviewed    Infusion Medications    dexmedetomidine 1.4 mcg/kg/hr (10/31/21 1431)     Scheduled Medications    furosemide  40 mg IntraVENous Daily    carvedilol  12.5 mg Oral BID WC    aztreonam  1,000 mg IntraVENous Q8H    valsartan  320 mg Oral Daily    pantoprazole  40 mg Oral QAM AC    pravastatin  40 mg Oral Nightly    citalopram  20 mg Oral Daily    colchicine  0.6 mg Oral Every Other Day    aspirin  81 mg Oral Daily    febuxostat  40 mg Oral Daily    fluticasone  2 spray Each Nostril Daily    remdesivir IVPB  100 mg IntraVENous Q24H    enoxaparin  30 mg SubCUTAneous BID    dexamethasone  6 mg IntraVENous Q24H    guaiFENesin  600 mg Oral BID    albuterol-ipratropium  1 puff Inhalation Q6H     PRN Meds: labetalol, morphine, hydrALAZINE **OR** hydrALAZINE, sennosides-docusate sodium      Intake/Output Summary (Last 24 hours) at 10/31/2021 1603  Last data filed at 10/31/2021 1431  Gross per 24 hour   Intake 3013.16 ml   Output 5400 ml   Net -2386.84 ml       Exam:    BP (!) 168/49   Pulse 66   Temp 98.1 °F (36.7 °C) (Bladder)   Resp 19   Wt 293 lb 10.4 oz (133.2 kg)   SpO2 97%   BMI 48.87 kg/m²     General appearance: awake, on BIPAP  Respiratory:  Diminished with crackles bilaterally . Cardiovascular: Regular rate and rhythm, S1/S2 . Abdomen: Soft, active bowel sounds. Musculoskeletal: No edema bilaterally. Labs:   Recent Labs     10/29/21  0525 10/29/21  0525 10/30/21  0525 10/31/21  0413 10/31/21  0514   WBC 10.2  --  5.4  --  8.0   HGB 13.8   < > 13.6 14.5 14.3   HCT 42.0  --  40.4  --  41.3     --  202  --  240    < > = values in this interval not displayed.      Recent Labs     10/29/21  0525 10/29/21  0525 10/30/21  0525 10/31/21  0413 10/31/21  0514   *  --  134*  --  133*   K 5.0*  --  4.0  --  3.5     --  101  --  98   CO2 17*  --  20  --  20   BUN 19  --  22  --  30*   CREATININE 0.89   < > 0.82 1.0 0.93*   CALCIUM 8.1*  --  8.3*  --  8.3*    < > = values in this interval not displayed. Recent Labs     10/29/21  0525 10/30/21  0525 10/31/21  0514   AST 58* 38* 44*   ALT 30 24 28   BILITOT 0.5 0.6 0.7   ALKPHOS 49 55 66     No results for input(s): INR in the last 72 hours. No results for input(s): Adonica Hoose in the last 72 hours. Urinalysis:      Lab Results   Component Value Date    NITRU Negative 10/27/2021    WBCUA 10-20 10/27/2021    BACTERIA MODERATE 10/27/2021    RBCUA 5-10 10/27/2021    BLOODU Moderate 10/27/2021    SPECGRAV >=1.030 10/27/2021    GLUCOSEU Negative 10/27/2021    GLUCOSEU NEG 12/17/2011       Radiology:  US DUP UPPER EXTREMITY LEFT VENOUS   Final Result   NO SONOGRAPHIC EVIDENCE, DEEP VEIN THROMBOSIS, LEFT UPPER EXTREMITY, WITH ABOVE LIMITATIONS. US DUP UPPER EXTREMITY RIGHT VENOUS   Final Result   NO SONOGRAPHIC EVIDENCE , DEEP VEIN THROMBOSIS, RIGHT UPPER EXTREMITY. US DUP LOWER EXTREMITIES BILATERAL VENOUS   Final Result   NO ULTRASOUND SIGNS OF THROMBUS IN THE BILATERAL LOWER EXTREMITY DEEP VENOUS SYSTEMS FROM THE GROINS TO THE POPLITEAL FOSSA              Assessment/Plan:    75-year-old female with history of HTN, asthma, CKD3, GERD, gout, JARON, morbid obesity who presented with:    Acute hypoxic respiratory failure   - secondary to COVID-19 pneumonia  - requiring ICU, HFNC/BIPAP and precedex  - on IV Remdesivir, Decadron, SC Lovenox  - s/p actemra  - baricitinib stopped  - management per critical care and ID    E. Coli UTI  - on Aztreonam per ID    Hyponatremia   - improving    AG metabolic acidosis  - lactate was normal  - monitor    HTN  - continue home meds  - IV Hydralazine as needed    CKD3  - monitor renal function      Diet: ADULT DIET;  Regular; Safety Tray; Safety Tray (Disposables)    Code Status: Full Code              Electronically signed by Lynne Scott MD on 10/31/2021 at 4:03 PM

## 2021-10-31 NOTE — PROGRESS NOTES
Infectious Diseases Inpatient Progress Note          HISTORY OF PRESENT ILLNESS:  Follow up critical COVID-19 pneumonia with acute respiratory failure and hypoxia, E. coli UTI, status post Actemra, on IV remdesivir and Azactam.  Remains to be severely hypoxic, in ICU, on BiPAP 85%. refusing to be intubated positive shortness of breath and generalized weakness.    + anxiety      Current Medications:     furosemide  40 mg IntraVENous Daily    carvedilol  12.5 mg Oral BID WC    aztreonam  1,000 mg IntraVENous Q8H    valsartan  320 mg Oral Daily    pantoprazole  40 mg Oral QAM AC    pravastatin  40 mg Oral Nightly    citalopram  20 mg Oral Daily    colchicine  0.6 mg Oral Every Other Day    aspirin  81 mg Oral Daily    febuxostat  40 mg Oral Daily    fluticasone  2 spray Each Nostril Daily    remdesivir IVPB  100 mg IntraVENous Q24H    enoxaparin  30 mg SubCUTAneous BID    dexamethasone  6 mg IntraVENous Q24H    guaiFENesin  600 mg Oral BID    albuterol-ipratropium  1 puff Inhalation Q6H       Allergies:  Augmentin [amoxicillin-pot clavulanate], Aspartame, Atorvastatin calcium, Carbapenems, Cephalexin, Clonidine derivatives, Diclofenac sodium, Doxycycline hyclate, Enalapril, Fexofenadine hydrochloride, Flagyl [metronidazole], Imipenem, Levofloxacin, Lisinopril, Macrolides and ketolides, Metronidazole hcl, Nsaids, Oxaprozin, Oxycodone-acetaminophen, Paroxetine, Sulfa antibiotics, Tape [adhesive tape], and Tramadol      Review of Systems  Limited BiPAP 14 system review is negative other than HPI/     Physical Exam  Vitals:    10/31/21 1303 10/31/21 1315 10/31/21 1400 10/31/21 1500   BP:   (!) 176/52 (!) 168/49   Pulse: 73  70 66   Resp: 26 (!) 35 16 19   Temp:       TempSrc:       SpO2: 92%  95% 97%   Weight:         General Appearance: alert and oriented to person, place and time, well-developed and well-nourished, in no acute distress, on BiPAP  Elevated blood pressure secondary to anxiety  Skin: warm and dry, no rash. Head: normocephalic and atraumatic  Eyes: anicteric sclerae  ENT: oropharynx clear and moist with normal mucous membranes. No oral thrush  Lungs: normal respiratory effort, bilateral fine rales with diminished breath sounds bilateral lung fields, no wheezes   Heart normal S1-S2 no murmur  Abdomen: soft, no tenderness, obese with large pannus   good urine output, clear in [place]  No leg edema  No erythema, no tenderness      DATA:    Lab Results   Component Value Date    WBC 8.0 10/31/2021    HGB 14.3 10/31/2021    HCT 41.3 10/31/2021    MCV 96.2 10/31/2021     10/31/2021     Lab Results   Component Value Date    CREATININE 0.93 (H) 10/31/2021    BUN 30 (H) 10/31/2021     (L) 10/31/2021    K 3.5 10/31/2021    CL 98 10/31/2021    CO2 20 10/31/2021       Hepatic Function Panel:  Lab Results   Component Value Date    ALKPHOS 66 10/31/2021    ALT 28 10/31/2021    AST 44 10/31/2021    PROT 5.6 10/31/2021    BILITOT 0.7 10/31/2021    BILIDIR 0.3 01/11/2012    IBILI 0.5 01/11/2012    LABALBU 3.1 10/31/2021    LABALBU 4.2 05/18/2012       Microbiology:   No results for input(s): BC in the last 72 hours. No results for input(s): Lurene Cypher in the last 72 hours. No results for input(s): LABURIN in the last 72 hours. Susceptibility    Escherichia coli (1)    Antibiotic Interpretation MITZI Status   amoxicillin-clavulanate Sensitive 4 mcg/mL    ampicillin Resistant >=32 mcg/mL    ceFAZolin Sensitive <=4 mcg/mL    ciprofloxacin Sensitive <=0.25 mcg/mL    gentamicin Sensitive <=1 mcg/mL    nitrofurantoin Sensitive <=16 mcg/mL    trimethoprim-sulfamethoxazole Sensitive <=20 mcg/mL    Lab and Collection  10/29/2021  8:10 AM - Mello, [facility] Incoming Lab Results From Soft    Component Value Ref Range & Units Status Collected Lab   D-Dimer, Quant 1. 59High Panic   0.00 - 0.50 mg/L FEU Final 10/29/2021        10/29/2021  6:14 AM - Mello, Chpo Incoming Lab Results From Soft    Component Value Ref Range & Units Status Collected Lab   CRP 33.0High   0.0 - 5.0 mg/L Final 10/29/2021  5:25 AM [facility] - [facility]   Testing Performed By          IMPRESSION:    · Critical COVID-19 pneumonia  · Acute respiratory failure with hypoxia   · Hypercoagulable state  · Acute lower UTI with E. coli    Patient Active Problem List   Diagnosis    Polyarthralgia    HTN (hypertension)    Gout    GERD (gastroesophageal reflux disease)    Dyslipidemia    Irritable bowel syndrome    Obesity, Class III, BMI 40-49.9 (morbid obesity) (HCC)    Migraine headache    Microscopic hematuria    Splenic mass    Allergic rhinitis    CKD (chronic kidney disease) stage 3, GFR 30-59 ml/min (HCC)    Asthma    JARON (obstructive sleep apnea)    Anxiety    OA (osteoarthritis) of knee    Midsternal chest pain    Palpitation    Bunion    Carpal tunnel syndrome    Chronic maxillary sinusitis    Melancholia    Ganglion of tendon sheath    Glaucoma suspect    Acquired hallux rigidus    Hypermetropia    Lateral epicondylitis    Multiple-type hyperlipidemia    Nuclear senile cataract    Primary localized osteoarthrosis of ankle and foot    Sensory hearing loss, bilateral    Swelling, mass, or lump in head and neck    Menopausal symptom    Tear film insufficiency    Skin cancer    Pneumonia    Moderate persistent asthma, uncomplicated    [id number]    Acute respiratory failure with hypoxia (HCC)    Hypercoagulable state ([facility] [address]    E. coli UTI       PLAN:  · IV remdesivir for 5 days, start date 10/28, may need to extend for up to 10 days if lack of clinical improvement  · IV Azactam for 1 week  · Status post Actemra 800 mg  · Oxygen support and weaning as tolerated  · Follow-up CBC complete metabolic profile  · Low-dose Lasix  · Follow-up D-dimer    Discussed with patient and RN    Nico Flood MD

## 2021-10-31 NOTE — PROGRESS NOTES
2100 pt pulled bipap mask off. Saturation decreased into the 70's. Applied mask back to patient educated on importance of keeping mask on. Pt expresses understanding. Saturation returned to 96%. Pt had further questions about code status. Informed Dr Calvin York. He came to bedside to answer questions for patient. Code status changed to full code after discussion with Dr Calvin York. Dr Diego Parsons called and updated on patient's code status. 0300 Pt refused bed bath. Said she would rather sleep and will request when ready. VSS at this time, safety measures in place.     0700 handoff report given to oncoming nurse

## 2021-11-01 NOTE — PROGRESS NOTES
[id number]- Patient agreeable and consents to intubation, placement of central venous catheter and arterial line. Patient intubated at bedside without incident, propofol and fentanyl gtt started for sedation. Restraints started for patient safety.

## 2021-11-01 NOTE — PROGRESS NOTES
Pulmonary & Critical Care Medicine ICU Progress Note  Chief complaint : COVID-19 pneumonia, acute respiratory failure    Subjunctive/24 hour events :   Patient seen and examined during multidisciplinary rounds with RN, charge nurse, RT, pharmacy, dietitian, and social service. She is on BiPAP, she feels short of breath, denies pain she is currently on 85% FiO2, saturation 88 to 90%, no vomiting,no bowel movement, urine output 1800 cc, -1.6 L, no fever. Social History     Tobacco Use    Smoking status: Former Smoker     Quit date: 1974     Years since quittin.3    Smokeless tobacco: Never Used   Substance Use Topics    Alcohol use: No     Comment: quit          Problem Relation Age of Onset    Heart Disease Mother     Cancer Father         lung       Recent Labs     10/31/21  0413   PHART 7.465*   [id number] 30*   PO2ART 85*       MV Settings:     / / /FiO2 : 85 %           IV:   dexmedetomidine 1.4 mcg/kg/hr (21 0526)       Vitals:  BP (!) 162/54   Pulse 59   Temp 97.2 °F (36.2 °C) (Bladder)   Resp 23   Wt 293 lb 10.4 oz (133.2 kg)   SpO2 91%   BMI 48.87 kg/m²    Tmax:        Intake/Output Summary (Last 24 hours) at 2021 0832  Last data filed at 2021 0530  Gross per 24 hour   Intake 1490 ml   Output 1850 ml   Net -360 ml       EXAM:  General: On BiPAP, no   distress  Head: normocephalic, atraumatic  Eyes:No gross abnormalities. ENT:  MMM no lesions  Neck:  supple and no masses  Chest : Diminished air movement, bilateral rales, no wheezing, nontender, tympanic  Heart[de-identified] Heart sounds are normal.  Regular rate and rhythm without murmur, gallop or rub. ABD:  symmetric, soft, non-tender, no guarding or rebound  Musculoskeletal : no cyanosis, no clubbing and no edema  Neuro:  Grossly normal  Skin: No rashes or nodules noted.   Lymph node:  no cervical nodes  Urology: Yes [facility]   Psychiatric: appropriate    Medications:  Scheduled Meds:   furosemide  40 mg IntraVENous Daily  carvedilol  12.5 mg Oral BID WC    aztreonam  1,000 mg IntraVENous Q8H    valsartan  320 mg Oral Daily    pantoprazole  40 mg Oral QAM AC    pravastatin  40 mg Oral Nightly    citalopram  20 mg Oral Daily    colchicine  0.6 mg Oral Every Other Day    aspirin  81 mg Oral Daily    febuxostat  40 mg Oral Daily    fluticasone  2 spray Each Nostril Daily    remdesivir IVPB  100 mg IntraVENous Q24H    enoxaparin  30 mg SubCUTAneous BID    dexamethasone  6 mg IntraVENous Q24H    guaiFENesin  600 mg Oral BID    albuterol-ipratropium  1 puff Inhalation Q6H       PRN Meds:  labetalol, morphine, hydrALAZINE **OR** hydrALAZINE, sennosides-docusate sodium    Results: reviewed by me   CBC:   Recent Labs     10/30/21  0525 10/30/21  0525 10/31/21  0413 10/31/21  0514 11/01/21  0511   WBC 5.4  --   --  8.0 7.9   HGB 13.6   < > 14.5 14.3 13.9   HCT 40.4  --   --  41.3 40.3   MCV 96.5  --   --  96.2 96.5     --   --  240 225    < > = values in this interval not displayed. BMP:   Recent Labs     10/30/21  0525 10/31/21  0413 10/31/21  0514 11/01/21  0511   *  --  133* 138   K 4.0  --  3.5 3.5     --  98 102   CO2 20  --  20 20   BUN 22  --  30* 38*   CREATININE 0.82 1.0 0.93* 1.01*     LIVER PROFILE:   Recent Labs     10/30/21  0525 10/31/21  0514 11/01/21  0511   AST 38* 44* 53*   ALT 24 28 41*   BILITOT 0.6 0.7 0.7   ALKPHOS 55 66 71     PT/INR: No results for input(s): PROTIME, INR in the last 72 hours. APTT: No results for input(s): APTT in the last 72 hours. UA:No results for input(s): NITRITE, COLORU, PHUR, LABCAST, WBCUA, RBCUA, MUCUS, TRICHOMONAS, YEAST, BACTERIA, CLARITYU, SPECGRAV, LEUKOCYTESUR, UROBILINOGEN, BILIRUBINUR, BLOODU, GLUCOSEU, AMORPHOUS in the last 72 hours.     Invalid input(s): Justin Mcneil    Cultures:  Negative so far  Films:  CXR reviewed by me and it showed bilateral groundglass infiltrates  CT chest shows bilateral groundglass infiltrates  Ultrasounds October 28 shows

## 2021-11-01 NOTE — CONSULTS
Palliative Care Consult Note  Patient: Camille Carrillo  Gender: female  YOB: 1945  Unit/Bed: IC06/IC06-01  Code Status: Full Code  Inpatient Treatment Team: Treatment Team: Attending Provider: Steffi Juan MD; Consulting Physician: Rachid Cartwright DO; Utilization Reviewer: Floyd Holland, BENITA; Consulting Physician: Javi Gonzales MD; Utilization Reviewer: Malorie Ulloa RN; : Dany Souza; Registered Nurse: Merrick Mendoza RN  Admit Date:  10/27/2021    Chief Complaint: Goals of Care    History of Presenting Illness:      Camille Carrillo is a 68 y.o. female on hospital day 5 with a history of  asthma, not chronically on supplemental O2, CKD, GERD, hypertension, hyperlipidemia, gout, JARON with morbid obesity, and chronic pain. Patient is not vaccinated. Presented to ED for progressively worsening shortness of breath and tested positive for COVID-19. In the emergency department PF ratio was 96 she required 100% FiO2 via non-rebreather. T-max 99.1 respirations 33 pulse 91 blood pressure 184/65. Base metabolic panel shows a sodium 127 with a creatinine of 1.25. Mild bump in AST at 61 ALT 30. ABG done showed a pH of 7.36 PCO2 35 PO2 59 and bicarb of 20 this is on 100% FiO2 with O2 sat of 90%. Chest CT shows diffuse bilateral infiltrates throughout the lung fields not present on CT chest done in August. Code status was discussed in ED as she was adamant against intubation or CPR. However she changed her mind and requested FULL CODE. Currently She is on BiPAP, she feels short of breath, denies pain she is currently on 85% FiO2, saturation 88 to 90%, no fever. On IV Remdesivir, Decadron, Treated for E. Coli UTI. On precedex drip for anxiety and agitation. Morphine for pain and dyspnea. Not eating well due to anorexia and being on BiPAP, but is ordered a normal diet. Review of Systems:       Review of Systems   Constitutional: Positive for fatigue.  Negative for activity change, appetite change, chills, diaphoresis, fever and unexpected weight change. HENT: Negative for drooling, hearing loss, mouth sores, sore throat, trouble swallowing and voice change. Eyes: Negative for discharge, itching and visual disturbance. Respiratory: Positive for cough and shortness of breath. Negative for apnea, choking, chest tightness, wheezing and stridor. Cardiovascular: Negative for chest pain, palpitations and leg swelling. Gastrointestinal: Negative for abdominal distention, abdominal pain, anal bleeding, blood in stool, constipation, diarrhea, nausea, rectal pain and vomiting. Genitourinary: Negative for difficulty urinating, dysuria, enuresis, frequency and hematuria. Musculoskeletal: Negative for arthralgias, back pain, gait problem, joint swelling and myalgias. Skin: Negative for color change, pallor, rash and wound. Allergic/Immunologic: Negative for food allergies and immunocompromised state. Neurological: Negative for dizziness, tremors, seizures, syncope, facial asymmetry, speech difficulty, weakness, light-headedness, numbness and headaches. Hematological: Negative for adenopathy. Does not bruise/bleed easily. Psychiatric/Behavioral: Negative for agitation, behavioral problems, confusion, decreased concentration, dysphoric mood, hallucinations, self-injury, sleep disturbance and suicidal ideas. The patient is not nervous/anxious and is not hyperactive. Physical Examination:       BP (!) 162/54   Pulse 59   Temp 97.2 °F (36.2 °C) (Bladder)   Resp 23   Wt 293 lb 10.4 oz (133.2 kg)   SpO2 91%   BMI 48.87 kg/m²    Physical Exam  Constitutional:       General: She is not in acute distress. Appearance: She is well-developed. She is not diaphoretic. HENT:      Head: Normocephalic and atraumatic.       Right Ear: External ear normal.      Left Ear: External ear normal.      Nose: Nose normal.      Mouth/Throat:      Pharynx: No oropharyngeal exudate. Eyes:      General: No scleral icterus. Right eye: No discharge. Left eye: No discharge. Conjunctiva/sclera: Conjunctivae normal.      Pupils: Pupils are equal, round, and reactive to light. Neck:      Thyroid: No thyromegaly. Vascular: No JVD. Trachea: No tracheal deviation. Cardiovascular:      Rate and Rhythm: Normal rate and regular rhythm. Heart sounds: Normal heart sounds. Pulmonary:      Effort: Pulmonary effort is normal. Tachypnea present. No respiratory distress. Breath sounds: No stridor. Decreased breath sounds and rhonchi present. No wheezing or rales. Chest:      Chest wall: No tenderness. Abdominal:      General: Bowel sounds are normal. There is no distension. Palpations: Abdomen is soft. There is no mass. Tenderness: There is no abdominal tenderness. There is no guarding or rebound. Musculoskeletal:         General: No tenderness or deformity. Normal range of motion. Cervical back: Normal range of motion and neck supple. Lymphadenopathy:      Cervical: No cervical adenopathy. Skin:     General: Skin is warm and dry. Findings: No erythema or rash. Neurological:      Mental Status: She is alert and oriented to person, place, and time. Psychiatric:         Behavior: Behavior normal.         Thought Content: Thought content normal.         Allergies:       Allergies   Allergen Reactions    Augmentin [Amoxicillin-Pot Clavulanate] Other (See Comments)     Lightheadedness and dizziness    Aspartame      welts    Atorvastatin Calcium     Carbapenems     Cephalexin Other (See Comments)     Mood, gi upset     Clonidine Derivatives     Diclofenac Sodium     Doxycycline Hyclate     Enalapril     Fexofenadine Hydrochloride     Flagyl [Metronidazole]     Imipenem     Levofloxacin Other (See Comments)     Nightmares     Lisinopril     Macrolides And Ketolides     Metronidazole Hcl     Nsaids     Oxaprozin  Oxycodone-Acetaminophen      Other reaction(s):  Other: See Comments  Pt states she gets gout    Paroxetine     Sulfa Antibiotics     Tape [facility] Tape]     Tramadol Other (See Comments)       Medications:      Current Facility-Administered Medications   Medication Dose Route Frequency Provider Last Rate Last Admin    furosemide (LASIX) injection 40 mg  40 mg IntraVENous Daily Jen Hahn MD   40 mg at 11/01/21 1008    labetalol (NORMODYNE;TRANDATE) injection 20 mg  20 mg IntraVENous Q4H PRN Murtis Cousin, DO   20 mg at 10/31/21 1110    carvedilol (COREG) tablet 12.5 mg  12.5 mg Oral BID WC Lara Turciosck, DO   12.5 mg at 11/01/21 1010    morphine (PF) injection 2 mg  2 mg IntraVENous Q1H PRN Murtis Cousin, DO   2 mg at 11/01/21 0407    aztreonam (AZACTAM) 1000 mg IVPB minibag  1,000 mg IntraVENous Q8H Jose Pat MD   Stopped at 11/01/21 0441    hydrALAZINE (APRESOLINE) injection 10 mg  10 mg IntraVENous Q4H PRN Murtis Cousin, DO   10 mg at 10/31/21 1304    Or    hydrALAZINE (APRESOLINE) injection 20 mg  20 mg IntraVENous Q4H PRN Murtis Cousin, DO   20 mg at 11/01/21 0406    valsartan (DIOVAN) tablet 320 mg  320 mg Oral Daily Brandon DAVALOS Sedar, DO   320 mg at 11/01/21 1008    pantoprazole (PROTONIX) tablet 40 mg  40 mg Oral QAM AC Brandon DAVALOS Sedar, DO   40 mg at 11/01/21 1009    pravastatin (PRAVACHOL) tablet 40 mg  40 mg Oral Nightly Brandon DAVALOS Sedar, DO   40 mg at 10/31/21 1948    citalopram (CELEXA) tablet 20 mg  20 mg Oral Daily Brandon DAVALOS Sedar, DO   20 mg at 11/01/21 1009    colchicine (COLCRYS) tablet 0.6 mg  0.6 mg Oral Every Other Day Shahram Easleyar, DO   0.6 mg at 11/01/21 1009    aspirin chewable tablet 81 mg  81 mg Oral Daily Brandon DAVALOS Sedar, DO   81 mg at 11/01/21 1011    febuxostat (ULORIC) tablet 40 mg  40 mg Oral Daily Fiorella Rust D Sedar, DO        fluticasone (FLONASE) 50 MCG/ACT nasal spray 2 spray  2 spray Each Nostril Daily Shahram North DO   2 spray at 10/28/21 4029    remdesivir History:   Procedure Laterality Date    BUNIONECTOMY      removal    CARDIOVASCULAR STRESS TEST      2011 normal    CHOLECYSTECTOMY      HYSTERECTOMY      SINUS SURGERY      TONSILLECTOMY         Social Hx:  Social History     Socioeconomic History    Marital status:      Spouse name: None    Number of children: None    Years of education: None    Highest education level: None   Occupational History    None   Tobacco Use    Smoking status: Former Smoker     Quit date: 1974     Years since quittin.3    Smokeless tobacco: Never Used   Vaping Use    Vaping Use: Never used   Substance and Sexual Activity    Alcohol use: No     Comment: quit     Drug use: No    Sexual activity: None   Other Topics Concern    None   Social History Narrative    None     Social Determinants of Health     Financial Resource Strain:     Difficulty of Paying Living Expenses:    Food Insecurity:     Worried About Running Out of Food in the Last Year:     Ran Out of Food in the Last Year:    Transportation Needs:     Lack of Transportation (Medical):      Lack of Transportation (Non-Medical):    Physical Activity:     Days of Exercise per Week:     Minutes of Exercise per Session:    Stress:     Feeling of Stress :    Social Connections:     Frequency of Communication with Friends and Family:     Frequency of Social Gatherings with Friends and Family:     Attends Mosque Services:     Active Member of Clubs or Organizations:     Attends Club or Organization Meetings:     Marital Status:    Intimate Partner Violence:     Fear of Current or Ex-Partner:     Emotionally Abused:     Physically Abused:     Sexually Abused:        Family Hx:  Family History   Problem Relation Age of Onset    Heart Disease Mother     Cancer Father         lung       LABS: Reviewed     CBC:  Lab Results   Component Value Date    WBC 7.9 2021    RBC 4.17 2021    RBC 4.15 2012    HGB 13.9 11/01/2021    HCT 40.3 11/01/2021    MCV 96.5 11/01/2021    MCH 33.3 11/01/2021    MCHC 34.5 11/01/2021    RDW 13.5 11/01/2021     11/01/2021    MPV 9.7 05/20/2015     CBC with Differential:   Lab Results   Component Value Date    WBC 7.9 11/01/2021    RBC 4.17 11/01/2021    RBC 4.15 05/18/2012    HGB 13.9 11/01/2021    HCT 40.3 11/01/2021     11/01/2021    MCV 96.5 11/01/2021    MCH 33.3 11/01/2021    MCHC 34.5 11/01/2021    RDW 13.5 11/01/2021    LYMPHOPCT 7.2 11/01/2021    MONOPCT 8.1 11/01/2021    EOSPCT 3.8 12/28/2011    BASOPCT 0.3 11/01/2021    MONOSABS 0.6 11/01/2021    LYMPHSABS 0.6 11/01/2021    EOSABS 0.0 11/01/2021    BASOSABS 0.0 11/01/2021     CMP:    Lab Results   Component Value Date     11/01/2021    K 3.5 11/01/2021     11/01/2021    CO2 20 11/01/2021    BUN 38 11/01/2021    CREATININE 1.01 11/01/2021    GFRAA >60.0 11/01/2021    LABGLOM 53.2 11/01/2021    GLUCOSE 111 11/01/2021    GLUCOSE 108 05/18/2012    PROT 5.4 11/01/2021    LABALBU 3.0 11/01/2021    LABALBU 4.2 05/18/2012    CALCIUM 8.6 11/01/2021    BILITOT 0.7 11/01/2021    ALKPHOS 71 11/01/2021    AST 53 11/01/2021    ALT 41 11/01/2021     BMP:    Lab Results   Component Value Date     11/01/2021    K 3.5 11/01/2021     11/01/2021    CO2 20 11/01/2021    BUN 38 11/01/2021    LABALBU 3.0 11/01/2021    LABALBU 4.2 05/18/2012    CREATININE 1.01 11/01/2021    CALCIUM 8.6 11/01/2021    GFRAA >60.0 11/01/2021    LABGLOM 53.2 11/01/2021    GLUCOSE 111 11/01/2021    GLUCOSE 108 05/18/2012     TSH:   Lab Results   Component Value Date    TSH 1.950 03/26/2014     Vitamin B12 and Folate: No components found for: FOLIC,  L65  Urinalysis:   Lab Results   Component Value Date    NITRU Negative 10/27/2021    WBCUA 10-20 10/27/2021    BACTERIA MODERATE 10/27/2021    RBCUA 5-10 10/27/2021    BLOODU Moderate 10/27/2021    SPECGRAV >=1.030 10/27/2021    GLUCOSEU Negative 10/27/2021    GLUCOSEU NEG 12/17/2011 FUNCTIONAL ADL´S:     Independent: [ x ] Eating, [   ] Dressing, [   ] Transferring, [   ] Kary Bigger, [   ] Bathing, [   ] Continence  Dependent   : [  ] Eating, [   ] Dressing, [   ] Transferring, [   ] Toileting, [   ] Bathing, [   ] Continence  W. assistant : [  ] Eating, [ x  ] Dressing, [  x ] Transferring, [ x  ] Toileting, [x   ] Bathing, [ x  ] Continencex    Radiology: Reviewed      No results found. Assessment and plan:      -Advance Care Planning  Discussed goals of care with patient. Explained in extensive detail nuances between full code, DNR CCA and DNR CC. As she became sicker, she feels she is not ready to die, and after much discussion about possible benefits vs risks of intubation with ventilation and what her options would be if she was unable to be weaned off the vent. Leona Bo has made the decision to be Full Code  MPOA in Place  She is now willing to be intubated, trached, and pegged      -Goals of Care Discussion:  Disease process and goals of treatment were discussed in basic terms. Her  goal is to optimize available comfort care measures to decrease hospitalizations and maximize function. We discussed the palliative care philosophy in light of those goals. We discussed all care options contingent on treatment response and QOL. Much active listening, presence, and emotional support were given. Due to severe COVID infection not responding to aggressive treatment, acute hypoxia with respiratory failure, inability to maintain her own airway, hypokalemia of 3.0, she is eligible for hospice should she choose to stop aggressive treatment. However she is choosing to escalate her treatment, palliative care will continue to follow. Thank you for allowing me to participate in  Her care.           While Hospitalized she is being treated for:  · Acute hypoxic respiratory failure  · Severe COVID-19 pneumonia  · Asthma, no signs of exacerbation  · Obesity  · Hypertension, blood pressure not controlled   · Acute kidney injury  · Hypercoagulable state    Electronically signed by MICHOACANO Hyde CNP on 11/1/2021 at 10:16 AM

## 2021-11-01 NOTE — PROGRESS NOTES
Comprehensive Nutrition Assessment    Type and Reason for Visit:  Initial, Consult (TF order and manage)    Nutrition Recommendations/Plan:   Change to Peptide Based TF (Vital 1.2) as Peptide Based High Protein(Vital HP) is currently on backorder. Continue @ 20 ml/hr x 24 hrs with 50 ml water flush every 4 hrs    Monitor rate of propofol for adjustments in TF goal rate    Nutrition Assessment:  Pt is at high risk for malnutrition, inadequate po intake > 5 days due to inability to come off of Bipap to eat. Now intubated and trophic TF ordered. Will monitor tolerance to TF and rate of propofol for modifications in TF goal rate to better meet energy/protein needsd    Malnutrition Assessment:  Malnutrition Status: At risk for malnutrition (Comment)    Context:  Acute Illness     Findings of the 6 clinical characteristics of malnutrition:  Energy Intake:  7 - 50% or less of estimated energy requirements for 5 or more days  Weight Loss:  No significant weight loss     Body Fat Loss:  Unable to assess     Muscle Mass Loss:  Unable to assess    Fluid Accumulation:  Unable to assess     Strength:  Not Performed    Estimated Daily Nutrient Needs:  Energy (kcal):  6480-6864 (kg x 8-11); Weight Used for Energy Requirements:  Current (132.9 kg)     Protein (g):  142 gm (kg x 2.5); Weight Used for Protein Requirements:  Ideal (56.8 kg)        Fluid (ml/day):  ~1200 ml (or as per MD); Method Used for Fluid Requirements:  1 ml/kcal      Nutrition Related Findings:  PMH CKD, GERD, IBS, htn, hld, asthma.   Labs/meds reviewed, trace, generalized edema, BM pta, CVC/peripheral lines, propofol @ 240ml/hr (633 kcal), intubated 11/1      Wounds:  None       Current Nutrition Therapies:    Diet NPO  ADULT TUBE FEEDING; Orogastric; Peptide Based; Continuous; 20; No; 50; Q 4 hours  Current Tube Feeding (TF) Orders:  · Feeding Route:  Not listed  · Formula: Peptide Based (Vital 1.2)  · Schedule: Continuous @ 20 ml/hr a 24 hrs  · Water Flushes: 50 ml every 4 hrs (300 ml)  · Current TF & Flush Orders Provides: 576 kcal, 36 gm protein, ~ 700 ml free water  · Goal TF & Flush Orders Provides: 576 kcal, 36 gm protein, ~ 700 ml free water    Anthropometric Measures:  · Height: 5' 5\" (165.1 cm)  · Current Body Weight: 293 lb (132.9 kg) (11/1)   · Admission Body Weight: 279 lb (126.6 kg) (? source)    · Usual Body Weight: 280 lb (127 kg) (7/24/21-office visit)     · Ideal Body Weight: 125 lbs; % Ideal Body Weight  > 100%   · BMI: 48.8  · BMI Categories: Obese Class 3 (BMI 40.0 or greater)       Nutrition Diagnosis:   · Inadequate oral intake related to impaired respiratory function as evidenced by intubation    Nutrition Interventions:   Food and/or Nutrient Delivery:  Modify Tube Feeding (Change to Peptide Based TF (Vital 1.2) as Peptide Based High Protein(Vtal HP) is currently on backorder. Continue @ 20 ml/hr x 24 hrs with 50 ml water flush every 4 hrs)  Nutrition Education/Counseling:  No recommendation at this time   Coordination of Nutrition Care:  Continue to monitor while inpatient    Goals:  Initiation and tolerance to TF       Nutrition Monitoring and Evaluation:   Food/Nutrient Intake Outcomes:  Enteral Nutrition Intake/Tolerance  Physical Signs/Symptoms Outcomes:  Biochemical Data, Hemodynamic Status, Weight     Discharge Planning:     Too soon to determine     Electronically signed by Marilyn Jewell RD, LD on 11/1/21 at 3:53 PM EDT

## 2021-11-01 NOTE — PROGRESS NOTES
Hospitalist Progress Note      PCP: Georgi Sinha MD    Date of Admission: 10/27/2021    Chief Complaint:    Patient seen and examined, patient was intubated this morning for worsening hypoxia, also started on Levophed, patient was on 3 mics of Levophed, borderline bradycardic with heart rate in low 50s, continue to monitor,    Medications:  Reviewed    Infusion Medications    fentaNYL (SUBLIMAZE) infusion 75 mcg/hr (11/01/21 1229)    propofol 30 mcg/kg/min (11/01/21 1519)    sodium chloride      dexmedetomidine 1.4 mcg/kg/hr (11/01/21 1308)     Scheduled Medications    atropine        chlorhexidine  15 mL Mouth/Throat BID    pantoprazole  40 mg IntraVENous Daily    And    sodium chloride (PF)  10 mL IntraVENous Daily    furosemide  40 mg IntraVENous Daily    [Held by provider] carvedilol  12.5 mg Oral BID WC    aztreonam  1,000 mg IntraVENous Q8H    [Held by provider] valsartan  320 mg Oral Daily    pravastatin  40 mg Oral Nightly    citalopram  20 mg Oral Daily    colchicine  0.6 mg Oral Every Other Day    aspirin  81 mg Oral Daily    febuxostat  40 mg Oral Daily    fluticasone  2 spray Each Nostril Daily    remdesivir IVPB  100 mg IntraVENous Q24H    enoxaparin  30 mg SubCUTAneous BID    dexamethasone  6 mg IntraVENous Q24H     PRN Meds: labetalol, morphine, hydrALAZINE **OR** hydrALAZINE, sennosides-docusate sodium      Intake/Output Summary (Last 24 hours) at 11/1/2021 1639  Last data filed at 11/1/2021 0530  Gross per 24 hour   Intake 761 ml   Output 550 ml   Net 211 ml       Exam:    BP (!) 171/58   Pulse 52   Temp 98.2 °F (36.8 °C) (Oral)   Resp 28   Ht 5' 5\" (1.651 m)   Wt 293 lb (132.9 kg)   SpO2 100%   BMI 48.76 kg/m²     General appearance: Intubated sedated  Respiratory:  Diminished with crackles bilaterally . Cardiovascular: Regular rate and rhythm, S1/S2 . Abdomen: Soft, active bowel sounds. Musculoskeletal: No edema bilaterally.     Labs:   Recent Labs 10/30/21  0525 10/31/21  0413 10/31/21  0514 10/31/21  0514 11/01/21 0511 11/01/21 1242 11/01/21  1617   WBC 5.4  --  8.0  --  7.9  --   --    HGB 13.6   < > 14.3   < > 13.9 12.8 13.8   HCT 40.4  --  41.3  --  40.3  --   --      --  240  --  225  --   --     < > = values in this interval not displayed. Recent Labs     10/30/21  0525 10/31/21  0413 10/31/21  0514 10/31/21  0514 11/01/21  0511 11/01/21  1242 11/01/21  1617   *  --  133*  --  138  --   --    K 4.0  --  3.5  --  3.5  --   --      --  98  --  102  --   --    CO2 20  --  20  --  20  --   --    BUN 22  --  30*  --  38*  --   --    CREATININE 0.82   < > 0.93*   < > 1.01* 1.1 1.4*   CALCIUM 8.3*  --  8.3*  --  8.6  --   --     < > = values in this interval not displayed. Recent Labs     10/30/21  0525 10/31/21  0514 11/01/21  0511   AST 38* 44* 53*   ALT 24 28 41*   BILITOT 0.6 0.7 0.7   ALKPHOS 55 66 71     No results for input(s): INR in the last 72 hours. No results for input(s): Holly Dross in the last 72 hours. Urinalysis:      Lab Results   Component Value Date    NITRU Negative 10/27/2021    WBCUA 10-20 10/27/2021    BACTERIA MODERATE 10/27/2021    RBCUA 5-10 10/27/2021    BLOODU Moderate 10/27/2021    SPECGRAV >=1.030 10/27/2021    GLUCOSEU Negative 10/27/2021    GLUCOSEU NEG 12/17/2011       Radiology:  XR CHEST PORTABLE   Final Result   BILATERAL LOWER LUNG ATELECTASIS/PNEUMONIA. US DUP UPPER EXTREMITY LEFT VENOUS   Final Result   NO SONOGRAPHIC EVIDENCE, DEEP VEIN THROMBOSIS, LEFT UPPER EXTREMITY, WITH ABOVE LIMITATIONS. US DUP UPPER EXTREMITY RIGHT VENOUS   Final Result   NO SONOGRAPHIC EVIDENCE , DEEP VEIN THROMBOSIS, RIGHT UPPER EXTREMITY.       US DUP LOWER EXTREMITIES BILATERAL VENOUS   Final Result   NO ULTRASOUND SIGNS OF THROMBUS IN THE BILATERAL LOWER EXTREMITY DEEP VENOUS SYSTEMS FROM THE GROINS TO THE POPLITEAL FOSSA              Assessment/Plan:    51-year-old female with history of HTN, asthma, CKD3, GERD, gout, JARON, morbid obesity who presented with:    Acute hypoxic respiratory failure   Currently intubated and sedated  - secondary to COVID-19 pneumonia  - - on IV Remdesivir, Decadron, SC Lovenox  - s/p actemra  - baricitinib stopped  - management per critical care and ID    E. Coli UTI  - on Aztreonam per ID    Hyponatremia   - improved     AG metabolic acidosis  - lactate was normal  - monitor    HTN: Patient currently hypotensive, antihypertensives has been on hold  -Currently on Levophed    CKD3  - monitor renal function      Diet: Diet NPO  ADULT TUBE FEEDING; Orogastric; Peptide Based; Continuous; 20; No; 50; Q 4 hours    Code Status: Full Code              Electronically signed by Teto Brito MD on 11/1/2021 at 4:39 PM

## 2021-11-01 NOTE — PROCEDURES
PROCEDURE:   Endotracheal intubation. INDICATIONS:   Acute Respiratory Failure. Consent   The patient provided verbal consent for this procedure. PROCEDURE SUMMARY:   Permit was implied secondary to emergent situation. Amac 4 blade  was inserted into the oropharynx at which time the vocal cords were visualized. 7.5 Telugu endotracheal tube was inserted and visualized going through the vocal cords. The stylette was removed. Colorimetric change was visualized on the CO2 meter. Breath sounds were heard in both lung fields equally. The endotracheal tube was placed at 22 cm, measured at the teeth. COMPLICATIONS:   None     ESTIMATED BLOOD LOSS:   0      MICHOACANO Small CNP 12:14 PM 11/1/2021 . Internal jugular central venous catheter; Ultrasound guided. [address] [facility]    INDICATION:   Access       CONSENT:  The patient provided verbal consent for this procedure. PROCEDURE SUMMARY:   A time-out was performed. The patient's right neck region was prepped and draped in sterile fashion. The right internal jugular vein was accessed under ultrasound guidance using a finder needle and sheath. U/S images were permanently documented. Anesthesia was achieved with 1% lidocaine. The finder needle was inserted into the right neck under direct ultrasound guidance, and venous blood was withdrawn. The introducer needle was then inserted under direct ultrasound guidance and venous blood was withdrawn into the syringe. The syringe was removed and the guidewire was advanced through the introducer needle. A small incision was made with a scalpel and the introducer needle was removed. A dilator was advanced over the guidewire until appropriate dilation was obtained. The dilator was removed and an central venous  catheter was advanced over the guidewire and secured into place with 2 sutures at 25 cm.  At time of procedure completion, all ports aspirated and flushed properly. Estimated Blood loss   less than 5 cc    COMPLICATIONS:  None          PROCEDURE:   Radial artery line placement. (A-line)  [id number]    INDICATION: Blood pressure monitoring and frequent abgs       CONSENT: The patient provided verbal consent for this procedure. nt. PROCEDURE SUMMARY:   Radial arteries were assessed by palpation and ultrasound localization. [place] test was done to asses collateral circulation. The patient was prepped and draped in the usual sterile manner using chlorhexidine scrub. 1% lidocaine was used to numb the region. The right  radial artery was palpated and successfully cannulated on the first pass. Pulsatile, arterial blood was visualized and the artery was then threaded using the Seldinger technique and a catheter was then sutured into place. Good wave-form was obtained. The patient tolerated the procedure well without any immediate complications. The area was cleaned and Tegaderm was applied.      ESTIMATED BLOOD LOSS:   Minimal    COMPLICATIONS:  No immediate complication     Nay Ochoa, APRN - CNP

## 2021-11-01 NOTE — PLAN OF CARE
Nutrition Problem #1: Inadequate oral intake  Intervention: Food and/or Nutrient Delivery: Modify Tube Feeding (Change to Peptide Based TF (Vital 1.2) as Peptide Based High Protein(Vtal HP) is currently on backorder.   Continue @ 20 ml/hr x 24 hrs with 50 ml water flush every 4 hrs)  Nutritional Goals: Initiation and tolerance to TF

## 2021-11-02 NOTE — PROGRESS NOTES
Infectious Disease Progress Note       11/1/2021    Follow up critical COVID-19 pneumonia with acute respiratory failure and hypoxia, E. coli UTI. Status post Actemra, on IV remdesivir and Azactam.      Hx obesity, asthma, salvador, hypercoaguable state. Intubated, sedated. HR 52, temp 36.3, Resp 28  [place] clear urine. Supine position  Exam assisted by nurse  Neck supple  Chest equal expansion  Abdomen ND  Extrem no new changes  Expression symmetrical/sedated. No obvious rashes. Lab Results   Component Value Date    WBC 7.9 11/01/2021    HGB 13.8 11/01/2021    HCT 40.3 11/01/2021    MCV 96.5 11/01/2021     11/01/2021     Lab Results   Component Value Date     11/01/2021    K 3.5 11/01/2021     11/01/2021    CO2 20 11/01/2021    BUN 38 11/01/2021    CREATININE 1.4 11/01/2021    CREATININE 1.01 11/01/2021    GLUCOSE 111 11/01/2021    GLUCOSE 108 05/18/2012    CALCIUM 8.6 11/01/2021        WBC trends are being monitored. Antibiotic doses are being adjusted per most recent renal labs.      Vitals:    11/01/21 2205   BP:    Pulse:    Resp: 28   Temp:    SpO2: 99%           Patient Active Problem List   Diagnosis    Polyarthralgia    HTN (hypertension)    Gout    GERD (gastroesophageal reflux disease)    Dyslipidemia    Irritable bowel syndrome    Obesity, Class III, BMI 40-49.9 (morbid obesity) ([facility])    Migraine headache    Microscopic hematuria    Splenic mass    Allergic rhinitis    CKD (chronic kidney disease) stage 3, GFR 30-59 ml/min ([facility])    Asthma    JARON (obstructive sleep apnea)    Anxiety    OA (osteoarthritis) of knee    Midsternal chest pain    Palpitation    Bunion    Carpal tunnel syndrome    Chronic maxillary sinusitis    Melancholia    Ganglion of tendon sheath    Glaucoma suspect    Acquired hallux rigidus    Hypermetropia    Lateral epicondylitis    Multiple-type hyperlipidemia    Nuclear senile cataract    Primary localized osteoarthrosis of ankle and foot    Sensory hearing loss, bilateral    Swelling, mass, or lump in head and neck    Menopausal symptom    Tear film insufficiency    Skin cancer    Pneumonia    Moderate persistent asthma, uncomplicated    VEAFF-22    Acute respiratory failure with hypoxia (HCC)    Hypercoagulable state ([facility] [address]    E. coli UTI           ASSESSMENT:  · Critical COVID-19 pneumonia  · Acute respiratory failure with hypoxia   · Hypercoagulable state  · Acute lower UTI with E. coli      PLAN:  Remdesivir x 5 days ( started 10/28), can possibly extend to 10 days in critical patient.    IV Azactam for E coli UTI x 7 days total.   S/p Actemra  Supportive care    Discussed with critical care team    Imaging and labs were reviewed per medical records and any ID pertinent labs were also addressed    Sharri Baker DO

## 2021-11-02 NOTE — PROGRESS NOTES
Palliative Care Progress Note  Patient: Jb Isbell  Gender: female  YOB: 1945  Unit/Bed: IC06/IC06-01  Code Status: Full Code  Inpatient Treatment Team: Treatment Team: Attending Provider: Reyes Constant, MD; Consulting Physician: Zee Pollack DO; Utilization Reviewer: Leatha Santiago RN; Consulting Physician: Mai Altman MD; : Phuong Amor; Registered Nurse: Laurent Gonsalez RN; : KIARRA Ayoub; Registered Nurse: [place] Jonas RN; [address] [place] [address] Nurse: Leatha Santiago RN  Admit Date:  10/27/2021    Chief Complaint: Goals of Care    History of Presenting Illness:      Jb Isbell is a 68 y.o. female on hospital day 6 with a history of  asthma, not chronically on supplemental O2, CKD, GERD, hypertension, hyperlipidemia, gout, JARON with morbid obesity, and chronic pain. Patient is not vaccinated. Presented to ED for progressively worsening shortness of breath and tested positive for COVID-19. In the emergency department PF ratio was 96 she required 100% FiO2 via non-rebreather. T-max 99.1 respirations 33 pulse 91 blood pressure 184/65. Base metabolic panel shows a sodium 127 with a creatinine of 1.25. Mild bump in AST at 61 ALT 30. ABG done showed a pH of 7.36 PCO2 35 PO2 59 and bicarb of 20 this is on 100% FiO2 with O2 sat of 90%. Chest CT shows diffuse bilateral infiltrates throughout the lung fields not present on CT chest done in August. Code status was discussed in ED as she was adamant against intubation or CPR. However she changed her mind and requested FULL CODE. Currently She is on BiPAP, she feels short of breath, denies pain she is currently on 85% FiO2, saturation 88 to 90%, no fever. On IV Remdesivir, Decadron, Treated for E. Coli UTI. On precedex drip for anxiety and agitation. Morphine for pain and dyspnea. Not eating well due to anorexia and being on BiPAP, but is ordered a normal diet.      11/2/21    Kathryn was intubated yesterday, currently intubated and sedated,  on 60% FiO2, and 10 of PEEP, saturation 97%, no fever, she is on Levophed at 3 mcg/min, currently sedated. No distress noted. Review of Systems:       Review of Systems   Constitutional: Positive for fatigue. Negative for activity change, appetite change, chills, diaphoresis, fever and unexpected weight change. HENT: Negative for drooling, hearing loss, mouth sores, sore throat, trouble swallowing and voice change. Eyes: Negative for discharge, itching and visual disturbance. Respiratory: Positive for cough and shortness of breath. Negative for apnea, choking, chest tightness, wheezing and stridor. Cardiovascular: Negative for chest pain, palpitations and leg swelling. Gastrointestinal: Negative for abdominal distention, abdominal pain, anal bleeding, blood in stool, constipation, diarrhea, nausea, rectal pain and vomiting. Genitourinary: Negative for difficulty urinating, dysuria, enuresis, frequency and hematuria. Musculoskeletal: Negative for arthralgias, back pain, gait problem, joint swelling and myalgias. Skin: Negative for color change, pallor, rash and wound. Allergic/Immunologic: Negative for food allergies and immunocompromised state. Neurological: Negative for dizziness, tremors, seizures, syncope, facial asymmetry, speech difficulty, weakness, light-headedness, numbness and headaches. Hematological: Negative for adenopathy. Does not bruise/bleed easily. Psychiatric/Behavioral: Negative for agitation, behavioral problems, confusion, decreased concentration, dysphoric mood, hallucinations, self-injury, sleep disturbance and suicidal ideas. The patient is not nervous/anxious and is not hyperactive.         Physical Examination:       BP (!) 85/30   Pulse 57   Temp 97.5 °F (36.4 °C) (Bladder)   Resp 28   Ht 5' 5\" (1.651 m)   Wt 293 lb (132.9 kg)   SpO2 97%   BMI 48.76 kg/m²    Physical Exam  Constitutional: General: She is not in acute distress. Appearance: She is well-developed. She is not diaphoretic. HENT:      Head: Normocephalic and atraumatic. Right Ear: External ear normal.      Left Ear: External ear normal.      Nose: Nose normal.      Mouth/Throat:      Pharynx: No oropharyngeal exudate. Eyes:      General: No scleral icterus. Right eye: No discharge. Left eye: No discharge. Conjunctiva/sclera: Conjunctivae normal.      Pupils: Pupils are equal, round, and reactive to light. Neck:      Thyroid: No thyromegaly. Vascular: No JVD. Trachea: No tracheal deviation. Cardiovascular:      Rate and Rhythm: Normal rate and regular rhythm. Heart sounds: Normal heart sounds. Pulmonary:      Effort: Pulmonary effort is normal. Tachypnea present. No respiratory distress. Breath sounds: No stridor. Decreased breath sounds and rhonchi present. No wheezing or rales. Chest:      Chest wall: No tenderness. Abdominal:      General: Bowel sounds are normal. There is no distension. Palpations: Abdomen is soft. There is no mass. Tenderness: There is no abdominal tenderness. There is no guarding or rebound. Musculoskeletal:         General: No tenderness or deformity. Normal range of motion. Cervical back: Normal range of motion and neck supple. Lymphadenopathy:      Cervical: No cervical adenopathy. Skin:     General: Skin is warm and dry. Findings: No erythema or rash. Neurological:      Mental Status: She is alert and oriented to person, place, and time. Psychiatric:         Behavior: Behavior normal.         Thought Content: Thought content normal.         Allergies:       Allergies   Allergen Reactions    Augmentin [Amoxicillin-Pot Clavulanate] Other (See Comments)     Lightheadedness and dizziness    Aspartame      welts    Atorvastatin Calcium     Carbapenems     Cephalexin Other (See Comments)     Mood, gi upset     Clonidine Derivatives     Diclofenac Sodium     Doxycycline Hyclate     Enalapril     Fexofenadine Hydrochloride     Flagyl [Metronidazole]     Imipenem     Levofloxacin Other (See Comments)     Nightmares     Lisinopril     Macrolides And Ketolides     Metronidazole Hcl     Nsaids     Oxaprozin     Oxycodone-Acetaminophen      Other reaction(s):  Other: See Comments  Pt states she gets gout    Paroxetine     Sulfa Antibiotics     Tape Gordon Standing Tape]     Tramadol Other (See Comments)       Medications:      Current Facility-Administered Medications   Medication Dose Route Frequency Provider Last Rate Last Admin    sennosides-docusate sodium (SENOKOT-S) 8.6-50 MG tablet 1 tablet  1 tablet Oral BID Sunny Ch MD   1 tablet at 11/02/21 0933    fentaNYL (SUBLIMAZE) 1,000 mcg in sodium chloride 0.9 % 100 mL infusion  12.5-200 mcg/hr IntraVENous Continuous Mariella Amass, APRN - CNP 7.5 mL/hr at 11/01/21 1229 75 mcg/hr at 11/01/21 1229    propofol injection  5-50 mcg/kg/min IntraVENous Titrated Mariella Amass, APRN - CNP 40 mL/hr at 11/02/21 0934 50 mcg/kg/min at 11/02/21 0934    chlorhexidine (PERIDEX) 0.12 % solution 15 mL  15 mL Mouth/Throat BID [place] Amass, APRN - CNP   15 mL at 11/02/21 0933    pantoprazole (PROTONIX) injection 40 mg  40 mg IntraVENous Daily Mariella Amass, APRN - CNP   40 mg at 11/02/21 0933    And    sodium chloride (PF) 0.9 % injection 10 mL  10 mL IntraVENous Daily Mariella Amass, APRN - CNP        norepinephrine (LEVOPHED) 16 mg in dextrose 5% 250 mL infusion  2-100 mcg/min IntraVENous Continuous Mariella Amass, APRN - CNP 18.8 mL/hr at 11/02/21 1008 20 mcg/min at 11/02/21 1008    [Held by provider] furosemide (LASIX) injection 40 mg  40 mg IntraVENous Daily Jen Hahn MD   40 mg at 11/01/21 1008    labetalol (NORMODYNE;TRANDATE) injection 20 mg  20 mg IntraVENous Q4H PRN Bill Rubin DO   20 mg at 10/31/21 1110    [Held by provider] carvedilol (COREG) tablet 12.5 mg 12.5 mg Oral BID  Lara Bee, DO   12.5 mg at 11/01/21 1010    morphine (PF) injection 2 mg  2 mg IntraVENous Q1H PRN Julia Combe, DO   2 mg at 11/01/21 0407    aztreonam (AZACTAM) 1000 mg IVPB minibag  1,000 mg IntraVENous Q8H Karsten Ulloa MD   Stopped at 11/02/21 0700    hydrALAZINE (APRESOLINE) injection 10 mg  10 mg IntraVENous Q4H PRN Julia Combe, DO   10 mg at 11/01/21 1036    Or    hydrALAZINE (APRESOLINE) injection 20 mg  20 mg IntraVENous Q4H PRN Julia Combe, DO   20 mg at 11/01/21 0406    [Held by provider] valsartan (DIOVAN) tablet 320 mg  320 mg Oral Daily Madeleine Ukrainiandaphne Easleyar, DO   320 mg at 11/01/21 1008    pravastatin (PRAVACHOL) tablet 40 mg  40 mg Oral Nightly Brandon North, DO   40 mg at 11/01/21 2130    citalopram (CELEXA) tablet 20 mg  20 mg Oral Daily Brandon DAVALOS Sedar, DO   20 mg at 11/02/21 0933    colchicine (COLCRYS) tablet 0.6 mg  0.6 mg Oral Every Other Day Riky North, DO   0.6 mg at 11/01/21 1009    aspirin chewable tablet 81 mg  81 mg Oral Daily Brandon DAVALOS Sedar, DO   81 mg at 11/02/21 0933    febuxostat (ULORIC) tablet 40 mg  40 mg Oral Daily Madeleine Ukrainian ANOOP Easleyar, DO        fluticasone (FLONASE) 50 MCG/ACT nasal spray 2 spray  2 spray Each Nostril Daily Riky North, DO   2 spray at 10/28/21 0903    remdesivir 100 mg in sodium chloride 0.9 % 250 mL IVPB  100 mg IntraVENous Q24H Herberth Hahn  mL/hr at 11/02/21 0954 100 mg at 11/02/21 0954    sennosides-docusate sodium (SENOKOT-S) 8.6-50 MG tablet 2 tablet  2 tablet Oral Daily PRN Julia Combe, DO        enoxaparin (LOVENOX) injection 30 mg  30 mg SubCUTAneous BID Riky Vaca Sedar, DO   30 mg at 11/02/21 0934    dexamethasone (DECADRON) injection 6 mg  6 mg IntraVENous Q24H [place] Shoe Sedar, DO   6 mg at 11/02/21 0934    dexmedetomidine (PRECEDEX) 400 mcg in sodium chloride 0.9 % 100 mL infusion  0.2-1.4 mcg/kg/hr IntraVENous Continuous Riky Shoe Sedar, DO 44.5 mL/hr at 11/01/21 0526 1.4 mcg/kg/hr at 11/01/21 5087 History:       PMHx:  Past Medical History:   Diagnosis Date    Allergic rhinitis 2013    Anxiety 2013    Asthma     Carpal tunnel syndrome 2015    Cellulitis of toe, right 2012    Cholelithiasis, common bile duct 1/10/2012    CKD (chronic kidney disease) stage 3, GFR 30-59 ml/min [facility]) 2013    Depression     Dyslipidemia 2011    GERD (gastroesophageal reflux disease)     Gout 10/24/2011    HTN (hypertension) 10/24/2011    Hypertension     Irritable bowel syndrome 2011    Microscopic hematuria 2012    Midsternal chest pain     Migraine headache 2012    Multiple-type hyperlipidemia 2015    Muscle spasm 10/24/2011    Obesity, Class III, BMI 40-49.9 (morbid obesity) ([facility] 2011    JARON (obstructive sleep apnea) 2013    Osteoarthritis     Other activity(E029.9)     sensative to vicryl sutures    Palpitations     Polyarthralgia 10/24/2011    Screening mammogram     Skin cancer 10/10/2016    Splenic mass 2012       PSHx:  Past Surgical History:   Procedure Laterality Date    BUNIONECTOMY      removal    CARDIOVASCULAR STRESS TEST       normal    CHOLECYSTECTOMY      HYSTERECTOMY      SINUS SURGERY      TONSILLECTOMY         Social Hx:  Social History     Socioeconomic History    Marital status:      Spouse name: None    Number of children: None    Years of education: None    Highest education level: None   Occupational History    None   Tobacco Use    Smoking status: Former Smoker     Quit date: 1974     Years since quittin.2    Smokeless tobacco: Never Used   Vaping Use    Vaping Use: Never used   Substance and Sexual Activity    Alcohol use: No     Comment: quit     Drug use: No    Sexual activity: None   Other Topics Concern    None   Social History Narrative    None     Social Determinants of Health     Financial Resource Strain:     Difficulty of Paying Living Expenses: Food Insecurity:     Worried About Running Out of Food in the Last Year:     [address] in the Last Year:    Transportation Needs:     Lack of Transportation (Medical):      Lack of Transportation (Non-Medical):    Physical Activity:     Days of Exercise per Week:     Minutes of Exercise per Session:    Stress:     Feeling of Stress :    Social Connections:     Frequency of Communication with Friends and Family:     Frequency of Social Gatherings with Friends and Family:     Attends [employer] Services:     Active Member of Clubs or Organizations:     Attends Club or Organization Meetings:     Marital Status:    Intimate Partner Violence:     Fear of Current or Ex-Partner:     Emotionally Abused:     Physically Abused:     Sexually Abused:        Family Hx:  Family History   Problem Relation Age of Onset    Heart Disease Mother     Cancer Father         lung       LABS: Reviewed     CBC:  Lab Results   Component Value Date    WBC 16.9 11/02/2021    RBC 4.12 11/02/2021    RBC 4.15 05/18/2012    HGB 12.7 11/02/2021    HCT 39.8 11/02/2021    MCV 96.5 11/02/2021    MCH 32.5 11/02/2021    MCHC 33.7 11/02/2021    RDW 13.8 11/02/2021     11/02/2021    MPV 9.7 05/20/2015     CBC with Differential:   Lab Results   Component Value Date    WBC 16.9 11/02/2021    RBC 4.12 11/02/2021    RBC 4.15 05/18/2012    HGB 12.7 11/02/2021    HCT 39.8 11/02/2021     11/02/2021    MCV 96.5 11/02/2021    MCH 32.5 11/02/2021    MCHC 33.7 11/02/2021    RDW 13.8 11/02/2021    NRBC 1 11/02/2021    LYMPHOPCT 1.0 11/02/2021    MONOPCT 3.9 11/02/2021    EOSPCT 3.8 12/28/2011    BASOPCT 0.1 11/02/2021    MONOSABS 0.7 11/02/2021    LYMPHSABS 0.2 11/02/2021    EOSABS 0.0 11/02/2021    BASOSABS 0.0 11/02/2021     CMP:    Lab Results   Component Value Date     11/02/2021    K 3.6 11/02/2021     11/02/2021    CO2 18 11/02/2021    BUN 55 11/02/2021    CREATININE 2.2 11/02/2021    CREATININE 1.72 11/02/2021 GFRAA 26 11/02/2021    LABGLOM 22 11/02/2021    GLUCOSE 92 11/02/2021    GLUCOSE 108 05/18/2012    PROT 5.8 11/02/2021    LABALBU 3.1 11/02/2021    LABALBU 4.2 05/18/2012    CALCIUM 8.5 11/02/2021    BILITOT 0.7 11/02/2021    ALKPHOS 75 11/02/2021    AST 56 11/02/2021    ALT 56 11/02/2021     BMP:    Lab Results   Component Value Date     11/02/2021    K 3.6 11/02/2021     11/02/2021    CO2 18 11/02/2021    BUN 55 11/02/2021    LABALBU 3.1 11/02/2021    LABALBU 4.2 05/18/2012    CREATININE 2.2 11/02/2021    CREATININE 1.72 11/02/2021    CALCIUM 8.5 11/02/2021    GFRAA 26 11/02/2021    LABGLOM 22 11/02/2021    GLUCOSE 92 11/02/2021    GLUCOSE 108 05/18/2012     TSH:   Lab Results   Component Value Date    TSH 1.950 03/26/2014     Vitamin B12 and Folate: No components found for: FOLIC,  I06  Urinalysis:   Lab Results   Component Value Date    NITRU Negative 10/27/2021    WBCUA 10-20 10/27/2021    BACTERIA MODERATE 10/27/2021    RBCUA 5-10 10/27/2021    BLOODU Moderate 10/27/2021    SPECGRAV >=1.030 10/27/2021    GLUCOSEU Negative 10/27/2021    GLUCOSEU NEG 12/17/2011           FUNCTIONAL ADL´S:     Independent: [ x ] Eating, [   ] Dressing, [   ] Transferring, [   ] Royal Rushing, [   ] Tamea Pall, [   ] Continence  Dependent   : [  ] Eating, [   ] Dressing, [   ] Transferring, [   ] [place] Rushing, [   ] Bathing, [   ] Continence  W. assistant : [  ] Eating, [ x  ] Dressing, [  x ] Transferring, [ x  ] Toileting, [x   ] Bathing, [ x  ] Continence    Radiology: Reviewed      No results found. Assessment and plan:      -Advance Care Planning  Discussed goals of care with patient. Explained in extensive detail nuances between full code, DNR CCA and DNR CC. As she became sicker, she feels she is not ready to die, and after much discussion about possible benefits vs risks of intubation with ventilation and what her options would be if she was unable to be weaned off the vent.     Jadiel Ortega has made the decision to be Full Code  MPOA in Place  She is now willing to be intubated, trached, and pegged      -Goals of Care Discussion:  Disease process and goals of treatment were discussed in basic terms. Her  goal is to optimize available comfort care measures to decrease hospitalizations and maximize function. We discussed the palliative care philosophy in light of those goals. We discussed all care options contingent on treatment response and QOL. Much active listening, presence, and emotional support were given. Due to severe COVID infection not responding to aggressive treatment, acute hypoxia with respiratory failure, inability to maintain her own airway, hypokalemia of 3.0, she is eligible for hospice should she choose to stop aggressive treatment. However she is choosing to escalate her treatment, palliative care will continue to follow. Thank you for allowing me to participate in  Her care.       11/2/21     Attempted to call SHAGU[facility] to answer questions and offer support, LVM    While Hospitalized she is being treated for:  · Acute hypoxic respiratory failure  · Severe COVID-19 pneumonia  · Asthma, no signs of exacerbation  · Obesity  · Hypertension, blood pressure not controlled   · Acute kidney injury  · Hypercoagulable state    Electronically signed by MICHOACANO He CNP on 11/2/2021 at 10:47 AM

## 2021-11-02 NOTE — PROGRESS NOTES
Hospitalist Progress Note      PCP: Gaston Finn MD    Date of Admission: 10/27/2021    Chief Complaint:    Patient seen and examined, patient continues to be intubated and sedated, urine output has been fair, creatinine went up to 1.7 this morning, point-of-care creatinine showed 2.2, monitor BMP daily, monitor urine output, if no improvement will consult nephrology,  No Bm today     Medications:  Reviewed    Infusion Medications    fentaNYL (SUBLIMAZE) infusion 150 mcg/hr (11/02/21 1604)    propofol 50 mcg/kg/min (11/02/21 1642)    norepinephrine 12 mcg/min (11/02/21 1557)    dexmedetomidine 1.4 mcg/kg/hr (11/01/21 0260)     Scheduled Medications    sennosides-docusate sodium  1 tablet Oral BID    miconazole   Topical BID    potassium chloride  60 mEq Oral Once    chlorhexidine  15 mL Mouth/Throat BID    pantoprazole  40 mg IntraVENous Daily    And    sodium chloride (PF)  10 mL IntraVENous Daily    [Held by provider] furosemide  40 mg IntraVENous Daily    [Held by provider] carvedilol  12.5 mg Oral BID WC    aztreonam  1,000 mg IntraVENous Q8H    [Held by provider] valsartan  320 mg Oral Daily    pravastatin  40 mg Oral Nightly    citalopram  20 mg Oral Daily    colchicine  0.6 mg Oral Every Other Day    aspirin  81 mg Oral Daily    febuxostat  40 mg Oral Daily    fluticasone  2 spray Each Nostril Daily    remdesivir IVPB  100 mg IntraVENous Q24H    enoxaparin  30 mg SubCUTAneous BID    dexamethasone  6 mg IntraVENous Q24H     PRN Meds: labetalol, morphine, hydrALAZINE **OR** hydrALAZINE, sennosides-docusate sodium      Intake/Output Summary (Last 24 hours) at 11/2/2021 1803  Last data filed at 11/2/2021 1200  Gross per 24 hour   Intake 340 ml   Output 1600 ml   Net -1260 ml       Exam:    BP (!) 85/30   Pulse 66   Temp 97.5 °F (36.4 °C) (Bladder)   Resp 28   Ht 5' 5\" (1.651 m)   Wt 293 lb (132.9 kg)   SpO2 95%   BMI 48.76 kg/m²     General appearance: Intubated VENOUS   Final Result   NO ULTRASOUND SIGNS OF THROMBUS IN THE BILATERAL LOWER EXTREMITY DEEP VENOUS SYSTEMS FROM THE GROINS TO THE POPLITEAL FOSSA      XR CHEST PORTABLE    (Results Pending)           Assessment/Plan:    51-year-old female with history of HTN, asthma, CKD3, GERD, gout, JARON, morbid obesity who presented with:    Acute hypoxic respiratory failure   Currently intubated and sedated  - secondary to COVID-19 pneumonia  - - on IV Remdesivir, Decadron, SC Lovenox  - s/p actemra  - baricitinib stopped  - management per critical care and ID    E. Coli UTI  - on Aztreonam per ID    Hyponatremia   - improved     AG metabolic acidosis  - lactate was normal  - monitor    HTN: Patient currently hypotensive, antihypertensives has been on hold  -Currently on Levophed    MIRELLA on CKD stage III  Creatinine went up to 1.7 this morning, point of creatinine was 2.2, monitor urine output has been fair, continue to monitor, if no improvement will consult nephrology    Hypokalemia, will replace potassium, check magnesium    Leukocytosis likely reactive  Patient has been afebrile,  on Mestinon per ID, continue to monitor    Diet: Diet NPO  ADULT TUBE FEEDING; Orogastric; Peptide Based; Continuous; 20; No; 50; Q 4 hours    Code Status: Full Code              Electronically signed by Mckayla Mckee MD on 11/2/2021 at 6:03 PM

## 2021-11-02 NOTE — PROGRESS NOTES
Pulmonary & Critical Care Medicine ICU Progress Note  Chief complaint : COVID-19 pneumonia, acute respiratory failure    Subjunctive/24 hour events :   Patient seen and examined during multidisciplinary rounds with RN, charge nurse, RT, pharmacy, dietitian, and social service. Patient was intubated yesterday, currently intubated and sedated, she is now on 60% FiO2, and 10 of PEEP, saturation 97%, no fever overnight, she is on Levophed at 3 mcg/min, currently sedated, tolerates tube feed,no bowel movement, urine output 1600 cc. Social History     Tobacco Use    Smoking status: Former Smoker     Quit date: 1974     Years since quittin.3    Smokeless tobacco: Never Used   Substance Use Topics    Alcohol use: No     Comment: quit          Problem Relation Age of Onset    Heart Disease Mother     Cancer Father         lung       Recent Labs     21  1242 21  1617   PHART 7.340* 7.372   [id number] 42 38   PO2ART 359* 243*       MV Settings:  Vent Mode: AC/VC Rate Set: 28 bmp/Vt Ordered: 370 mL/ /FiO2 : 60 %           IV:   fentaNYL (SUBLIMAZE) infusion 75 mcg/hr (21 1229)    propofol 40 mcg/kg/min (21 0640)    norepinephrine 3 mcg/min (21 1859)    dexmedetomidine 1.4 mcg/kg/hr (21 0526)       Vitals:  BP (!) 85/30   Pulse 57   Temp 97.5 °F (36.4 °C) (Bladder)   Resp 28   Ht 5' 5\" (1.651 m)   Wt 293 lb (132.9 kg)   SpO2 97%   BMI 48.76 kg/m²    Tmax:        Intake/Output Summary (Last 24 hours) at 2021 0829  Last data filed at 2021 0400  Gross per 24 hour   Intake 240 ml   Output 1600 ml   Net -1360 ml       EXAM:  General: On vent, sedated, no distress  Head: normocephalic, atraumatic  Eyes:No gross abnormalities.   ENT:  MMM no lesions, ET and OG tube in  Neck:  supple and no masses  Chest : Good air movement, vent sounds, bilateral rales, no wheezing, nontender, tympanic  Heart[de-identified] Heart sounds are normal.  Regular rate and rhythm without murmur, gallop or rub. ABD:  symmetric, soft, non-tender, no guarding or rebound  Musculoskeletal : no cyanosis, no clubbing and no edema  Neuro: Sedated, unresponsive  Skin: No rashes or nodules noted. Lymph node:  no cervical nodes  Urology: Yes [facility]   Psychiatric: Sedated and calm    Medications:  Scheduled Meds:   sennosides-docusate sodium  1 tablet Oral BID    chlorhexidine  15 mL Mouth/Throat BID    pantoprazole  40 mg IntraVENous Daily    And    sodium chloride (PF)  10 mL IntraVENous Daily    furosemide  40 mg IntraVENous Daily    [Held by provider] carvedilol  12.5 mg Oral BID WC    aztreonam  1,000 mg IntraVENous Q8H    [Held by provider] valsartan  320 mg Oral Daily    pravastatin  40 mg Oral Nightly    citalopram  20 mg Oral Daily    colchicine  0.6 mg Oral Every Other Day    aspirin  81 mg Oral Daily    febuxostat  40 mg Oral Daily    fluticasone  2 spray Each Nostril Daily    remdesivir IVPB  100 mg IntraVENous Q24H    enoxaparin  30 mg SubCUTAneous BID    dexamethasone  6 mg IntraVENous Q24H       PRN Meds:  labetalol, morphine, hydrALAZINE **OR** hydrALAZINE, sennosides-docusate sodium    Results: reviewed by me   CBC:   Recent Labs     10/31/21  0514 10/31/21  0514 11/01/21  0511 11/01/21  0511 11/01/21  1242 11/01/21  1617 11/02/21  0600   WBC 8.0  --  7.9  --   --   --  16.9*   HGB 14.3   < > 13.9   < > 12.8 13.8 13.4   HCT 41.3  --  40.3  --   --   --  39.8   MCV 96.2  --  96.5  --   --   --  96.5     --  225  --   --   --  331    < > = values in this interval not displayed.      BMP:   Recent Labs     10/31/21  0413 10/31/21  0514 10/31/21  0514 11/01/21  0511 11/01/21  1242 11/01/21  1617 11/02/21  0600   NA  --  133*  --  138  --   --  136   K  --  3.5  --  3.5  --   --  3.6   CL  --  98  --  102  --   --  100   CO2  --  20  --  20  --   --  18*   BUN  --  30*  --  38*  --   --  55*   CREATININE   < > 0.93*   < > 1.01* 1.1 1.4* 1.72*    < > = values in this interval not displayed. LIVER PROFILE:   Recent Labs     10/31/21  0514 11/01/21  0511 11/02/21  0600   AST 44* 53* 56*   ALT 28 41* 56*   BILITOT 0.7 0.7 0.7   ALKPHOS 66 71 75     PT/INR: No results for input(s): PROTIME, INR in the last 72 hours. APTT: No results for input(s): APTT in the last 72 hours. UA:No results for input(s): NITRITE, COLORU, PHUR, LABCAST, WBCUA, RBCUA, MUCUS, TRICHOMONAS, YEAST, BACTERIA, CLARITYU, SPECGRAV, LEUKOCYTESUR, UROBILINOGEN, BILIRUBINUR, BLOODU, GLUCOSEU, AMORPHOUS in the last 72 hours. Invalid input(s): Azell Govern    Cultures:  Negative so far  Films:  CXR reviewed by me and it showed bilateral basilar groundglass changes        Assessment:   This is a critically ill patient at risk of deterioration / death , needing close ICU monitoring and intervention due to below noted problems     · Acute hypoxic respiratory failure  · Severe COVID-19 pneumonia  · Asthma, no signs of exacerbation  · Septic shock  · Obesity  · Hypertension, currently hypotensive on Levophed  · Acute kidney injury  · Hypercoagulable state    Recommendation  · Vent support lung protective strategy  · head of the bed 30°  · Breathing trials when FiO2 and PEEP requirement improves  · Daily sedation holidays  · Sedation with combination propofol and fentanyl target R ASS of 0 to -1  · Watch for ICU delirium: TV on, natural light, avoid benzos, pain control, early mobility, and family engagement  · PUD prophylaxis  · DVT prophylaxis  · Dexamethasone 6 mg daily for 10 days  · Remdesivir for 5 days  · Hold Lasix   · Follow-up cultures  · Check procalcitonin and repeat D-dimer in Am   · Chest x-ray in a.m.  · Monitor renal function and urine output  · Target blood sugar 140180  · Levophed to maintain arterial pressure 65-70          Due to the immediate potential for life-threatening deterioration due to acute respiratory failure I spent 35  minutes providing critical care.  This time is excluding time spent performing procedures.           Electronically signed by Brad Weber MD,  [facility] ,on 11/2/2021 at 8:29 AM

## 2021-11-03 NOTE — PROGRESS NOTES
Palliative Care Progress Note  Patient: Andreia Gaitan  Gender: female  YOB: 1945  Unit/Bed: IC06/IC06-01  Code Status: Full Code  Inpatient Treatment Team: Treatment Team: Attending Provider: Kevin Meyers MD; Consulting Physician: Luis Isabel DO; Utilization Reviewer: Sathya Botello RN; Consulting Physician: Laurent Charles MD; [address] [place] [address] Nurse: Sathya Botello RN; : Ian Pineda; Registered Nurse: Sarah Mac, RN; Registered Nurse: Kofi Diaz, RN; : Jayshree Strange, [facility]; Consulting Physician: Marycruz Fan DO  Admit Date:  10/27/2021    Chief Complaint: Goals of Care    History of Presenting Illness:      Andreia Gaitan is a 68 y.o. female on hospital day 7 with a history of  asthma, not chronically on supplemental O2, CKD, GERD, hypertension, hyperlipidemia, gout, JARON with morbid obesity, and chronic pain. Patient is not vaccinated. Presented to ED for progressively worsening shortness of breath and tested positive for COVID-19. In the emergency department PF ratio was 96 she required 100% FiO2 via non-rebreather. T-max 99.1 respirations 33 pulse 91 blood pressure 184/65. Base metabolic panel shows a sodium 127 with a creatinine of 1.25. Mild bump in AST at 61 ALT 30. ABG done showed a pH of 7.36 PCO2 35 PO2 59 and bicarb of 20 this is on 100% FiO2 with O2 sat of 90%. Chest CT shows diffuse bilateral infiltrates throughout the lung fields not present on CT chest done in August. Code status was discussed in ED as she was adamant against intubation or CPR. However she changed her mind and requested FULL CODE. Currently She is on BiPAP, she feels short of breath, denies pain she is currently on 85% FiO2, saturation 88 to 90%, no fever. On IV Remdesivir, Decadron, Treated for E. Coli UTI. On precedex drip for anxiety and agitation. Morphine for pain and dyspnea.  Not eating well due to anorexia and being on BiPAP, but is ordered a normal diet. 11/2/21    Kathryn was intubated yesterday, currently intubated and sedated,  on 60% FiO2, and 10 of PEEP, saturation 97%, no fever, she is on Levophed at 3 mcg/min, currently sedated. No distress noted. 11/3/21     currently on 50% FiO2 and 8 of PEEP, saturation 97%, she is on fentanyl and propofol, weaning down, Visited during a sedation holiday. eyes open alert, slightly anxious, but able to be comforted. Review of Systems:       Review of Systems   Constitutional: Positive for fatigue. Negative for activity change, appetite change, chills, diaphoresis, fever and unexpected weight change. HENT: Negative for drooling, hearing loss, mouth sores, sore throat, trouble swallowing and voice change. Eyes: Negative for discharge, itching and visual disturbance. Respiratory: Positive for cough and shortness of breath. Negative for apnea, choking, chest tightness, wheezing and stridor. Cardiovascular: Negative for chest pain, palpitations and leg swelling. Gastrointestinal: Negative for abdominal distention, abdominal pain, anal bleeding, blood in stool, constipation, diarrhea, nausea, rectal pain and vomiting. Genitourinary: Negative for difficulty urinating, dysuria, enuresis, frequency and hematuria. Musculoskeletal: Negative for arthralgias, back pain, gait problem, joint swelling and myalgias. Skin: Negative for color change, pallor, rash and wound. Allergic/Immunologic: Negative for food allergies and immunocompromised state. Neurological: Negative for dizziness, tremors, seizures, syncope, facial asymmetry, speech difficulty, weakness, light-headedness, numbness and headaches. Hematological: Negative for adenopathy. Does not bruise/bleed easily. Psychiatric/Behavioral: Negative for agitation, behavioral problems, confusion, decreased concentration, dysphoric mood, hallucinations, self-injury, sleep disturbance and suicidal ideas.  The patient is not Allergies   Allergen Reactions    Augmentin [Amoxicillin-Pot Clavulanate] Other (See Comments)     Lightheadedness and dizziness    Aspartame      welts    Atorvastatin Calcium     Carbapenems     Cephalexin Other (See Comments)     Mood, gi upset     Clonidine Derivatives     Diclofenac Sodium     Doxycycline Hyclate     Enalapril     Fexofenadine Hydrochloride     Flagyl [Metronidazole]     Imipenem     Levofloxacin Other (See Comments)     Nightmares     Lisinopril     Macrolides And Ketolides     Metronidazole Hcl     Nsaids     Oxaprozin     Oxycodone-Acetaminophen      Other reaction(s):  Other: See Comments  Pt states she gets gout    Paroxetine     Sulfa Antibiotics     Tape Jeaneen Eastern Tape]     Tramadol Other (See Comments)       Medications:      Current Facility-Administered Medications   Medication Dose Route Frequency Provider Last Rate Last Admin    lactulose (CHRONULAC) 10 GM/15ML solution 20 g  20 g Oral 4 times per day MICHOACANO Panchal CNP   20 g at 11/03/21 1052    labetalol (NORMODYNE;TRANDATE) injection 10 mg  10 mg IntraVENous Q4H PRN MICHOACANO Panchal CNP   10 mg at 11/03/21 1052    dextrose 5 % in lactated ringers infusion   IntraVENous Continuous Myron Prado  mL/hr at 11/03/21 1108 New Bag at 11/03/21 1108    0.9 % sodium chloride bolus  250 mL IntraVENous Once Myron Prado DO        sennosides-docusate sodium (SENOKOT-S) 8.6-50 MG tablet 1 tablet  1 tablet Oral BID Lucy Díaz MD   1 tablet at 11/03/21 1101    miconazole (MICOTIN) 2 % powder   Topical BID Steffi Juan MD   Given at 11/03/21 1100    potassium chloride (KLOR-CON M) extended release tablet 60 mEq  60 mEq Oral Once Steffi Juan MD        fentaNYL (SUBLIMAZE) 1,000 mcg in sodium chloride 0.9 % 100 mL infusion  12.5-200 mcg/hr IntraVENous Continuous MICHOACANO Panchal CNP 10 mL/hr at 11/03/21 0756 100 mcg/hr at 11/03/21 0756    propofol injection  5-50 mcg/kg/min 2 tablet  2 tablet Oral Daily PRN Cynthia Hun, DO        enoxaparin (LOVENOX) injection 30 mg  30 mg SubCUTAneous BID Flores Hard Sedar, DO   30 mg at 11/03/21 1106    dexamethasone (DECADRON) injection 6 mg  6 mg IntraVENous Q24H Flores Hard Sedar, DO   6 mg at 11/03/21 1104    dexmedetomidine (PRECEDEX) 400 mcg in sodium chloride 0.9 % 100 mL infusion  0.2-1.4 mcg/kg/hr IntraVENous Continuous Flores Hard Sedar, DO 44.5 mL/hr at 11/01/21 0526 1.4 mcg/kg/hr at 11/01/21 0526       History:       PMHx:  Past Medical History:   Diagnosis Date    Allergic rhinitis 9/18/2013    Anxiety 9/18/2013    Asthma     Carpal tunnel syndrome 5/19/2015    Cellulitis of toe, right 6/5/2012    Cholelithiasis, common bile duct 1/10/2012    CKD (chronic kidney disease) stage 3, GFR 30-59 ml/min ([facility] 9/18/2013    Depression     Dyslipidemia 11/26/2011    GERD (gastroesophageal reflux disease)     Gout 10/24/2011    HTN (hypertension) 10/24/2011    Hypertension     Irritable bowel syndrome 11/26/2011    Microscopic hematuria 12/13/2012    Midsternal chest pain     Migraine headache 2/22/2012    Multiple-type hyperlipidemia 5/19/2015    Muscle spasm 10/24/2011    Obesity, Class III, BMI 40-49.9 (morbid obesity) ([facility] [address] 11/26/2011    JARON (obstructive sleep apnea) 9/18/2013    Osteoarthritis     Other activity(E029.9)     sensative to vicryl sutures    Palpitations     Polyarthralgia 10/24/2011    Screening mammogram     Skin cancer 10/10/2016    Splenic mass 12/13/2012       PSHx:  Past Surgical History:   Procedure Laterality Date    BUNIONECTOMY      removal    CARDIOVASCULAR STRESS TEST      2011 normal    CHOLECYSTECTOMY      HYSTERECTOMY      SINUS SURGERY      TONSILLECTOMY         Social Hx:  Social History     Socioeconomic History    Marital status:      Spouse name: None    Number of children: None    Years of education: None    Highest education level: None   Occupational History    None Tobacco Use    Smoking status: Former Smoker     Quit date: 1974     Years since quittin.3    Smokeless tobacco: Never Used   Vaping Use    Vaping Use: Never used   Substance and Sexual Activity    Alcohol use: No     Comment: quit     Drug use: No    Sexual activity: None   Other Topics Concern    None   Social History Narrative    None     Social Determinants of Health     Financial Resource Strain:     Difficulty of Paying Living Expenses:    Food Insecurity:     Worried About Running Out of Food in the Last Year:     Ran Out of Food in the Last Year:    Transportation Needs:     Lack of Transportation (Medical):      Lack of Transportation (Non-Medical):    Physical Activity:     Days of Exercise per Week:     Minutes of Exercise per Session:    Stress:     Feeling of Stress :    Social Connections:     Frequency of Communication with Friends and Family:     Frequency of Social Gatherings with Friends and Family:     Attends Baptism Services:     Active Member of Clubs or Organizations:     Attends Club or Organization Meetings:     Marital Status:    Intimate Partner Violence:     Fear of Current or Ex-Partner:     Emotionally Abused:     Physically Abused:     Sexually Abused:        Family Hx:  Family History   Problem Relation Age of Onset    Heart Disease Mother     Cancer Father         lung       LABS: Reviewed     CBC:  Lab Results   Component Value Date    WBC 17.0 2021    RBC 3.89 2021    RBC 4.15 2012    HGB 12.8 2021    HCT 37.8 2021    MCV 97.3 2021    MCH 32.8 2021    MCHC 33.7 2021    RDW 13.9 2021     2021    MPV 9.7 2015     CBC with Differential:   Lab Results   Component Value Date    WBC 17.0 2021    RBC 3.89 2021    RBC 4.15 2012    HGB 12.8 2021    HCT 37.8 2021     2021    MCV 97.3 2021    MCH 32.8 2021    MCHC 33.7 Continence    Radiology: Reviewed      No results found. Assessment and plan:      -Advance Care Planning  Discussed goals of care with patient. Explained in extensive detail nuances between full code, DNR CCA and DNR CC. As she became sicker, she feels she is not ready to die, and after much discussion about possible benefits vs risks of intubation with ventilation and what her options would be if she was unable to be weaned off the vent. Julieta Monroe has made the decision to be Full Code  MPOA in Place  She is now willing to be intubated, trached, and pegged      -Goals of Care Discussion:  Disease process and goals of treatment were discussed in basic terms. Her  goal is to optimize available comfort care measures to decrease hospitalizations and maximize function. We discussed the palliative care philosophy in light of those goals. We discussed all care options contingent on treatment response and QOL. Much active listening, presence, and emotional support were given. Due to severe COVID infection not responding to aggressive treatment, acute hypoxia with respiratory failure, inability to maintain her own airway, hypokalemia of 3.0, she is eligible for hospice should she choose to stop aggressive treatment. However she is choosing to escalate her treatment, palliative care will continue to follow. Thank you for allowing me to participate in  Her care.       11/2/21     [facility] and offer support, LV    While Hospitalized she is being treated for:  · Acute hypoxic respiratory failure  · Severe COVID-19 pneumonia  · Asthma, no signs of exacerbation  · Obesity  · Hypertension, blood pressure not controlled   · Acute kidney injury  · Hypercoagulable state    Electronically signed by MICHOACANO Gerber CNP on 11/3/2021 at 11:46 AM

## 2021-11-03 NOTE — PROGRESS NOTES
0600  Gross per 24 hour   Intake 2355.29 ml   Output 425 ml   Net 1930.29 ml       Exam:    BP (!) 85/30   Pulse 104   Temp 98.1 °F (36.7 °C) (Bladder)   Resp 29   Ht 5' 5\" (1.651 m)   Wt 293 lb (132.9 kg)   SpO2 90%   BMI 48.76 kg/m²     General appearance: Intubated sedated  Respiratory:  Diminished with crackles bilaterally . Cardiovascular: Regular rate and rhythm, S1/S2 . Abdomen: Soft, active bowel sounds. Musculoskeletal: No edema bilaterally. Labs:   Recent Labs     11/01/21  0511 11/01/21  1242 11/02/21  0600 11/02/21  1001 11/03/21  0600 11/03/21  0956 11/03/21  1242   WBC 7.9  --  16.9*  --  17.0*  --   --    HGB 13.9   < > 13.4   < > 12.8 12.8 12.2   HCT 40.3  --  39.8  --  37.8  --   --      --  331  --  312  --   --     < > = values in this interval not displayed. Recent Labs     11/01/21 0511 11/01/21 1242 11/02/21  0600 11/02/21  1001 11/03/21  0600 11/03/21  0956 11/03/21  1242 11/03/21  1314     --  136  --  133*  --   --   --    K 3.5  --  3.6  --  3.1*  --   --  3.4     --  100  --  96  --   --   --    CO2 20  --  18*  --  17*  --   --   --    BUN 38*  --  55*  --  71*  --   --   --    CREATININE 1.01*   < > 1.72*   < > 2.58* 2.5* 2.5*  --    CALCIUM 8.6  --  8.5  --  8.3*  --   --   --     < > = values in this interval not displayed. Recent Labs     11/01/21 0511 11/02/21  0600 11/03/21  0600   AST 53* 56* 34   ALT 41* 56* 46*   BILITOT 0.7 0.7 0.6   ALKPHOS 71 75 62     No results for input(s): INR in the last 72 hours. No results for input(s): Ping Gazella in the last 72 hours.     Urinalysis:      Lab Results   Component Value Date    NITRU Negative 10/27/2021    WBCUA 10-20 10/27/2021    BACTERIA MODERATE 10/27/2021    RBCUA 5-10 10/27/2021    BLOODU Moderate 10/27/2021    SPECGRAV >=1.030 10/27/2021    GLUCOSEU Negative 10/27/2021    GLUCOSEU NEG 12/17/2011       Radiology:  XR CHEST PORTABLE   Final Result   BILATERAL LOWER LUNG ATELECTASIS/PNEUMONIA. BILATERAL PLEURAL EFFUSIONS. XR CHEST PORTABLE   Final Result   BILATERAL LOWER LUNG ATELECTASIS/PNEUMONIA. US DUP UPPER EXTREMITY LEFT VENOUS   Final Result   NO SONOGRAPHIC EVIDENCE, DEEP VEIN THROMBOSIS, LEFT UPPER EXTREMITY, WITH ABOVE LIMITATIONS. US DUP UPPER EXTREMITY RIGHT VENOUS   Final Result   NO SONOGRAPHIC EVIDENCE , DEEP VEIN THROMBOSIS, RIGHT UPPER EXTREMITY. US DUP LOWER EXTREMITIES BILATERAL VENOUS   Final Result   NO ULTRASOUND SIGNS OF THROMBUS IN THE BILATERAL LOWER EXTREMITY DEEP VENOUS SYSTEMS FROM THE GROINS TO THE POPLITEAL FOSSA              Assessment/Plan:    70-year-old female with history of HTN, asthma, CKD3, GERD, gout, JARON, morbid obesity who presented with:    Acute hypoxic respiratory failure   Currently intubated and sedated  - secondary to COVID-19 pneumonia  - - on IV Remdesivir, Decadron, SC Lovenox , will consider stopping remdesivir soon creatinine clearance is less than 30 now, will defer to ID  -- baricitinib stopped  - management per critical care and ID    E. Coli UTI  - on Aztreonam per ID    Hyponatremia   - improved     MIRELLA withmetabolic acidosis, creatinine went up to 2.5 today nephrology has been consulted, urine output has been fair,  - lactate was normal      HTN: Patient currently hypertensive, Levophed has been turned off, patient on labetalol and hydralazine    Hypokalemia, will replace potassium, magnesium was checked which was normal    Leukocytosis, leukocytosis 17 today, patient already on Estraderm, also on dexamethasone as well,   Will check CT abdomen pelvis to rule out any intra-abdominal source of infection      Diet: Diet NPO  ADULT TUBE FEEDING; Orogastric; Peptide Based; Continuous; 20; No; 50; Q 4 hours    Code Status: Full Code              Electronically signed by Rima Smith MD on 11/3/2021 at 3:04 PM

## 2021-11-03 NOTE — PROGRESS NOTES
PHARMACY NOTE:   Renal Adjustment Per Protocol: aztreonam 1g IV Q8 hours changed to aztreonam 1g IV Q12 hours based on hospital renal dosing protocol for CrCl 10 to <30mL/min      Recent Labs     11/03/21  1705   CREATININE 2.4*   Estimated Creatinine Clearance: 28 mL/min (A) (based on SCr of 2.4 mg/dL (H)).   Moises Stewart, PharmD   11/3/2021 5:52 PM

## 2021-11-03 NOTE — CONSULTS
[address]                      [facility], [address] [facility]                                  CONSULTATION    PATIENT NAME: Shahab Avina               :        1945  MED REC NO:   [id number]                            ROOM:       IC06  ACCOUNT NO:   [de-identified]                           ADMIT DATE: 10/27/2021  PROVIDER:     Ida Alan DO    CONSULT DATE:  2021    RENAL CONSULT    HISTORY OF PRESENT ILLNESS:  A 70-year-old  female being seen  at the request of hospitalist due to change in renal function. On  admission to the hospital, the patient had a normal creatinine of 0.9  and a GFR greater than 60. Over the past 48 hours, the patient has seen  a consistent decline in numbers and presently has a GFR of 19 mL per  minute with a serum creatinine of 2.6. The patient is slightly  hyponatremic today and hypokalemic.  _____ show consistent metabolic  acidosis. She is on the ventilator and CPAP presently. Original  admission was for COVID-19. I and O show the patient is approximately  getting 50 mL of tube feedings with 50 mL flushes every 4 hours and IV a  total of 20 mL an hour per nursing. The patient is on remdesivir  presently, which I have discussed with Dr. Christina Ohara. She is also on  Decadron therapy. The patient is off pressor therapy, as she did have  hypotension at approximately 24-36 hours ago. PAST MEDICAL HISTORY:  Hypertension, migraine cephalgia, sleep apnea,  alternating between CKD IIIA and CKD II. No history of diabetes. PAST SURGICAL HISTORY:  Hysterectomy, sinus surgery, tonsil and  adenoidectomy, cholecystectomy, bunionectomy. HABITS:  Former smoker, stopped in . No alcohol, opioids, or  nonsteroidals. MEDICATIONS:  When she was admitted, Diovan HCTZ, Lasix, multiple  vitamins, lorazepam, Uloric, Mevacor, Flonase, Imitrex, Advair, Celexa,  Coreg, Norvasc, Cozaar, and Xopenex.   Some of these are duplicates and  not quite sure what the patient had and had not been taking. ALLERGIES:  Multiple allergies. Please relate to the chart, over 20. PHYSICAL EXAMINATION:  GENERAL:  The patient appears to be alert, off sedation. ET tube,  G-tube, and OG in place. VITAL SIGNS:  On admission, 5 feet 5 inches, 295 pounds. Presently, off  pressors, the patient's blood pressure is 170/40, heart rate 60,  respirations 28, and afebrile. NECK:  Supple. No JVD, bruits, or adenopathy. CHEST:  Lungs anteriorly show no wheezing, rales or rhonchi. CARDIOVASCULAR:  Heart is regular at approximately 65-70 beats per  minute, afebrile, breathing 28 per minute. A 1/6 systolic murmur,  distant. ABDOMEN:  Soft. No guarding or rigidity. Bowel sounds are decreased,  but present. EXTREMITIES:  Show no edema. Skin is warm and dry. No cyanosis of the  limbs. IMPRESSION:  1. MIRELLA on CKD II/IIIA, suspect prerenal azotemia. Watch following I  and O's.  2.  Brief episode of hypotension, hypertensive most likely due to  agitation. 3.  Metabolic acidosis. 4.  Hypokalemia being supplemented with IV.  5.  History of hypertension. PLAN:  We will start D5 or lactated Ringer in light of the patient's  potassium and need for fluid. Gentle bolus of 250 of saline. Try and  extubate in light of fact that the patient's blood pressure most likely  is related to her agitation. Follow up BMPs and CO2 levels for  metabolic acidosis. Consider remdesivir discontinuing.         Gissel Abreu DO    D: 11/03/2021 11:05:46       T: 11/03/2021 12:07:34     [id number]  Job#: [id number]     Doc#: [id number]    CC:

## 2021-11-03 NOTE — PROGRESS NOTES
Pulmonary & Critical Care Medicine ICU Progress Note  Chief complaint : COVID-19 pneumonia, acute respiratory failure    Subjunctive/24 hour events :   Patient seen and examined during multidisciplinary rounds with RN, charge nurse, RT, pharmacy, dietitian, and social service. On vent, sedated, did not wake up or follow commands, on 2 mcg/min Levophed, currently on 50% FiO2 and 8 of PEEP, saturation 97%, she is on fentanyl and propofol, weaning down, no fever, no vomiting, tolerates tube feed,no bowel movement, urine output 425 cc, she is +2 L overnight. Social History     Tobacco Use    Smoking status: Former Smoker     Quit date: 1974     Years since quittin.3    Smokeless tobacco: Never Used   Substance Use Topics    Alcohol use: No     Comment: quit          Problem Relation Age of Onset    Heart Disease Mother     Cancer Father         lung       Recent Labs     21  0405   PHART 7.323* 7.358   VES9DIZ 38 33*   PO2ART 96* 95*       MV Settings:  Vent Mode: AC/VC Rate Set: 28 bmp/Vt Ordered: 370 mL/ /FiO2 : 50 %           IV:   fentaNYL (SUBLIMAZE) infusion 100 mcg/hr (21 0756)    propofol 30 mcg/kg/min (21 0755)    norepinephrine 2 mcg/min (21 075)    dexmedetomidine 1.4 mcg/kg/hr (21 0526)       Vitals:  BP (!) 85/30   Pulse 68   Temp 98.1 °F (36.7 °C) (Bladder)   Resp 28   Ht 5' 5\" (1.651 m)   Wt 293 lb (132.9 kg)   SpO2 97%   BMI 48.76 kg/m²    Tmax:        Intake/Output Summary (Last 24 hours) at 11/3/2021 1380  Last data filed at 11/3/2021 0600  Gross per 24 hour   Intake 2405.29 ml   Output 425 ml   Net 1980.29 ml       EXAM:  General: On vent, sedated, no distress  Head: normocephalic, atraumatic  Eyes:No gross abnormalities.   ENT:  MMM no lesions, ET and OG tube in  Neck:  supple and no masses  Chest : Good air movement, no wheezing, minimal rales, nontender, tympanic  Heart[de-identified] Heart sounds are normal.  Regular rate and rhythm without murmur, gallop or rub. ABD:  symmetric, soft, non-tender, no guarding or rebound  Musculoskeletal : no cyanosis, no clubbing and no edema  Neuro: Sedated, unresponsive  Skin: No rashes or nodules noted. Lymph node:  no cervical nodes  Urology: Yes Grimes   Psychiatric: Sedated and calm    Medications:  Scheduled Meds:   sennosides-docusate sodium  1 tablet Oral BID    miconazole   Topical BID    potassium chloride  60 mEq Oral Once    chlorhexidine  15 mL Mouth/Throat BID    pantoprazole  40 mg IntraVENous Daily    And    sodium chloride (PF)  10 mL IntraVENous Daily    [Held by provider] furosemide  40 mg IntraVENous Daily    [Held by provider] carvedilol  12.5 mg Oral BID WC    aztreonam  1,000 mg IntraVENous Q8H    [Held by provider] valsartan  320 mg Oral Daily    pravastatin  40 mg Oral Nightly    citalopram  20 mg Oral Daily    colchicine  0.6 mg Oral Every Other Day    aspirin  81 mg Oral Daily    febuxostat  40 mg Oral Daily    fluticasone  2 spray Each Nostril Daily    remdesivir IVPB  100 mg IntraVENous Q24H    enoxaparin  30 mg SubCUTAneous BID    dexamethasone  6 mg IntraVENous Q24H       PRN Meds:  labetalol, morphine, hydrALAZINE **OR** hydrALAZINE, sennosides-docusate sodium    Results: reviewed by me   CBC:   Recent Labs     11/01/21  0511 11/01/21  1242 11/02/21  0600 11/02/21  1001 11/02/21 2112 11/03/21 0405 11/03/21  0600   WBC 7.9  --  16.9*  --   --   --  17.0*   HGB 13.9   < > 13.4   < > 13.2 12.6 12.8   HCT 40.3  --  39.8  --   --   --  37.8   MCV 96.5  --  96.5  --   --   --  97.3     --  331  --   --   --  312    < > = values in this interval not displayed.      BMP:   Recent Labs     11/01/21  0511 11/01/21  1242 11/02/21  0600 11/02/21  1001 11/02/21 2112 11/03/21  0405 11/03/21  0600     --  136  --   --   --  133*   K 3.5  --  3.6  --   --   --  3.1*     --  100  --   --   --  96   CO2 20  --  18*  --   --   --  17*   BUN 38*  -- 55*  --   --   --  71*   CREATININE 1.01*   < > 1.72*   < > 2.5* 2.7* 2.58*    < > = values in this interval not displayed. LIVER PROFILE:   Recent Labs     11/01/21  0511 11/02/21  0600 11/03/21  0600   AST 53* 56* 34   ALT 41* 56* 46*   BILITOT 0.7 0.7 0.6   ALKPHOS 71 75 62     PT/INR: No results for input(s): PROTIME, INR in the last 72 hours. APTT: No results for input(s): APTT in the last 72 hours. UA:No results for input(s): NITRITE, COLORU, PHUR, LABCAST, WBCUA, RBCUA, MUCUS, TRICHOMONAS, YEAST, BACTERIA, CLARITYU, SPECGRAV, LEUKOCYTESUR, UROBILINOGEN, BILIRUBINUR, BLOODU, GLUCOSEU, AMORPHOUS in the last 72 hours. Invalid input(s): Florette Coamo    Cultures:  Negative so far  Films:  CXR reviewed by me and it showed bilateral basal groundglass changes, and pleural effusions      Assessment:   This is a critically ill patient at risk of deterioration / death , needing close ICU monitoring and intervention due to below noted problems     · Acute hypoxic respiratory failure  · Severe COVID-19 pneumonia  · Asthma, no signs of exacerbation  · Septic shock  · Obesity  · Hypertension, currently hypotensive on Levophed  · Acute kidney injury  · Hypercoagulable state    Recommendation  · Vent support lung protective strategy  · head of the bed 30°  · Sedation holiday today  · Breathing trial later today when patient wakes up  · Sedation with combination propofol and fentanyl target R ASS of 0 to -1  · Watch for ICU delirium: TV on, natural light, avoid benzos, pain control, early mobility, and family engagement  · PUD prophylaxis  · DVT prophylaxis  · Dexamethasone 6 mg daily for 10 days  · Remdesivir for 5 days  · Monitor renal function  · Replace potassium   · Follow-up cultures  · Monitor renal function and urine output  · Target blood sugar 140180  · Levophed to maintain arterial pressure 65-70   · Antibiotics per ID        Due to the immediate potential for life-threatening deterioration due to acute respiratory failure I spent 35  minutes providing critical care.  This time is excluding time spent performing procedures.           Electronically signed by Yadira Velazquez MD,  [facility] ,on 11/3/2021 at 8:21 AM

## 2021-11-03 NOTE — PROGRESS NOTES
Infectious Disease Progress Note       11/3/2021    Follow up critical COVID-19 pneumonia with acute respiratory failure and hypoxia, E. coli UTI. Status post Actemra, on IV remdesivir and Azactam.      Hx obesity, asthma, salvador, hypercoaguable state. Intubated  Tube feeds, no BM yet. Exam assisted by nurse  Neck supple  Chest equal expansion  Abdomen ND  Extrem no new changes  Expression symmetrical/sedated. Now has a new fungal dermatitis groin area and under breasts. No increased secretions  Wakes up from sedation        Lab Results   Component Value Date    WBC 17.0 (H) 11/03/2021    HGB 12.8 11/03/2021    HCT 37.8 11/03/2021    MCV 97.3 11/03/2021     11/03/2021     Lab Results   Component Value Date     11/03/2021    K 3.1 11/03/2021    CL 96 11/03/2021    CO2 17 11/03/2021    BUN 71 11/03/2021    CREATININE 2.5 11/03/2021    CREATININE 2.58 11/03/2021    GLUCOSE 92 11/02/2021    GLUCOSE 108 05/18/2012    CALCIUM 8.3 11/03/2021        WBC trends are being monitored. Antibiotic doses are being adjusted per most recent renal labs.      Vitals:    11/03/21 0731   BP:    Pulse: 68   Resp: 28   Temp:    SpO2: 97%           Patient Active Problem List   Diagnosis    Polyarthralgia    HTN (hypertension)    Gout    GERD (gastroesophageal reflux disease)    Dyslipidemia    Irritable bowel syndrome    Obesity, Class III, BMI 40-49.9 (morbid obesity) ([facility])    Migraine headache    Microscopic hematuria    Splenic mass    Allergic rhinitis    CKD (chronic kidney disease) stage 3, GFR 30-59 ml/min (HCC)    Asthma    JARON (obstructive sleep apnea)    Anxiety    OA (osteoarthritis) of knee    Midsternal chest pain    Palpitation    Bunion    Carpal tunnel syndrome    Chronic maxillary sinusitis    Melancholia    Ganglion of tendon sheath    Glaucoma suspect    Acquired hallux rigidus    Hypermetropia    Lateral epicondylitis    Multiple-type hyperlipidemia    Nuclear senile cataract    Primary localized osteoarthrosis of ankle and foot    Sensory hearing loss, bilateral    Swelling, mass, or lump in head and neck    Menopausal symptom    Tear film insufficiency    Skin cancer    Pneumonia    Moderate persistent asthma, uncomplicated    [id number]    Acute respiratory failure with hypoxia (HCC)    Hypercoagulable state ([facility] [address]    E. coli UTI           ASSESSMENT:  · Critical COVID-19 pneumonia  · Acute respiratory failure with hypoxia   · Hypercoagulable state  · Acute lower UTI with E. Coli  · Fungal dermatitis      PLAN:  Remdesivir x 10 days ( started 10/28)  IV Azactam for E coli UTI x 7 days total.   S/p Actemra  Supportive care  Topical antifungals.  If necessary, can add one dose of fluconazole IV    Discussed with critical care team    Imaging and labs were reviewed per medical records and any ID pertinent labs were also addressed    Niyati Aminta Mcburney, DO

## 2021-11-04 NOTE — PROGRESS NOTES
Hospitalist Progress Note      PCP: Chester Corona MD    Date of Admission: 10/27/2021    Chief Complaint:    Patient seen. Physical exam deferred to decrease the spread of covid.      Medications:  Reviewed    Infusion Medications    dextrose 5% in lactated ringers 100 mL/hr at 11/04/21 0755    fentaNYL (SUBLIMAZE) infusion 100 mcg/hr (11/04/21 1112)    propofol 30 mcg/kg/min (11/04/21 0232)    norepinephrine 2 mcg/min (11/04/21 1239)    dexmedetomidine 0.4 mcg/kg/hr (11/04/21 1102)     Scheduled Medications    clonazePAM  0.5 mg Oral TID    midazolam  2 mg IntraVENous Once    lactulose  20 g Oral 4 times per day    potassium chloride  40 mEq Oral Once    aztreonam  1,000 mg IntraVENous Q12H    sennosides-docusate sodium  1 tablet Oral BID    miconazole   Topical BID    potassium chloride  60 mEq Oral Once    chlorhexidine  15 mL Mouth/Throat BID    pantoprazole  40 mg IntraVENous Daily    And    sodium chloride (PF)  10 mL IntraVENous Daily    [Held by provider] carvedilol  12.5 mg Oral BID WC    [Held by provider] valsartan  320 mg Oral Daily    pravastatin  40 mg Oral Nightly    citalopram  20 mg Oral Daily    aspirin  81 mg Oral Daily    fluticasone  2 spray Each Nostril Daily    remdesivir IVPB  100 mg IntraVENous Q24H    enoxaparin  30 mg SubCUTAneous BID    dexamethasone  6 mg IntraVENous Q24H     PRN Meds: labetalol, morphine, hydrALAZINE **OR** hydrALAZINE, sennosides-docusate sodium      Intake/Output Summary (Last 24 hours) at 11/4/2021 1520  Last data filed at 11/4/2021 0555  Gross per 24 hour   Intake 3281 ml   Output 650 ml   Net 2631 ml       Exam:    BP (!) 207/62   Pulse 82   Temp 98.4 °F (36.9 °C) (Bladder)   Resp (!) 31   Ht 5' 5\" (1.651 m)   Wt 293 lb (132.9 kg)   SpO2 95%   BMI 48.76 kg/m²     General appearance: Intubated sedated  Respiratory:  Equal chest rise   Cardiovascular: Regular rate on tele  Abdomen: non-distended   Musculoskeletal: No edema bilaterally. Labs:   Recent Labs     11/02/21  0600 11/02/21  1001 11/03/21  0600 11/03/21  0956 11/04/21  0526 11/04/21  1009 11/04/21  1204   WBC 16.9*  --  17.0*  --  16.4*  --   --    HGB 13.4   < > 12.8   < > 11.6* 12.4 11.9*   HCT 39.8  --  37.8  --  34.7*  --   --      --  312  --  256  --   --     < > = values in this interval not displayed. Recent Labs     11/02/21  0600 11/02/21  1001 11/03/21  0600 11/03/21  0956 11/03/21  1314 11/03/21  1705 11/04/21  0527 11/04/21  1009 11/04/21  1204     --  133*  --   --   --  135  --   --    K 3.6  --  3.1*  --  3.4  --  3.3*  --   --      --  96  --   --   --  100  --   --    CO2 18*  --  17*  --   --   --  19*  --   --    BUN 55*  --  71*  --   --   --  74*  --   --    CREATININE 1.72*   < > 2.58*   < >  --    < > 2.16* 2.1* 2.1*   CALCIUM 8.5  --  8.3*  --   --   --  8.2*  --   --     < > = values in this interval not displayed. Recent Labs     11/02/21  0600 11/03/21 0600 11/04/21 0527   AST 56* 34 38*   ALT 56* 46* 43*   BILITOT 0.7 0.6 0.5   ALKPHOS 75 62 55     No results for input(s): INR in the last 72 hours. No results for input(s): Kaleen Hope in the last 72 hours. Urinalysis:      Lab Results   Component Value Date    NITRU Negative 10/27/2021    WBCUA 10-20 10/27/2021    BACTERIA MODERATE 10/27/2021    RBCUA 5-10 10/27/2021    BLOODU Moderate 10/27/2021    SPECGRAV >=1.030 10/27/2021    GLUCOSEU Negative 10/27/2021    GLUCOSEU NEG 12/17/2011       Radiology:  CT ABDOMEN PELVIS WO CONTRAST Additional Contrast? None   Final Result      NO ACUTE INTRA-ABDOMINAL PROCESS IDENTIFIED. OTHER FINDINGS, AS NOTED. XR CHEST PORTABLE   Final Result   BILATERAL LOWER LUNG ATELECTASIS/PNEUMONIA. BILATERAL PLEURAL EFFUSIONS. XR CHEST PORTABLE   Final Result   BILATERAL LOWER LUNG ATELECTASIS/PNEUMONIA.       US DUP UPPER EXTREMITY LEFT VENOUS   Final Result   NO SONOGRAPHIC EVIDENCE, DEEP VEIN THROMBOSIS, LEFT UPPER EXTREMITY, WITH ABOVE LIMITATIONS. US DUP UPPER EXTREMITY RIGHT VENOUS   Final Result   NO SONOGRAPHIC EVIDENCE , DEEP VEIN THROMBOSIS, RIGHT UPPER EXTREMITY. US DUP LOWER EXTREMITIES BILATERAL VENOUS   Final Result   NO ULTRASOUND SIGNS OF THROMBUS IN THE BILATERAL LOWER EXTREMITY DEEP VENOUS SYSTEMS FROM THE GROINS TO THE POPLITEAL FOSSA      XR CHEST PORTABLE    (Results Pending)           Assessment/Plan:    70-year-old female with history of HTN, asthma, CKD3, GERD, gout, JARON, morbid obesity who presented with:    Acute hypoxic respiratory failure   Currently intubated and sedated  - secondary to COVID-19 pneumonia  - - on IV Remdesivir, Decadron, SC Lovenox , will consider stopping remdesivir soon creatinine clearance is less than 30 now, will defer to ID  -- baricitinib stopped  - management per critical care and ID    E. Coli UTI  - on Aztreonam per ID    Hyponatremia   - improved     MIRELLA withmetabolic acidosis- nephrology has been consulted, urine output has been fair,  - lactate was normal      HTN: Patient currently hypertensive, Levophed has been turned off, patient on labetalol and hydralazine    Hypokalemia, will replace potassium, magnesium was checked which was normal    Leukocytosis- on abx as per ID, also on dexamethasone as well,   Will check CT abdomen pelvis- negative for acute process       Diet: Diet NPO  ADULT TUBE FEEDING; Orogastric; Peptide Based; Continuous; 20; No; 50; Q 4 hours    Code Status: Full Code    Electronically signed by Carmen Reilly DO on 11/4/2021 at 3:20 PM

## 2021-11-04 NOTE — PROGRESS NOTES
Infectious Disease Progress Note       11/4/2021    Follow up critical COVID-19 pneumonia with acute respiratory failure and hypoxia, E. coli UTI. Status post Actemra, on IV remdesivir and Azactam.      Hx obesity, asthma, salvador, hypercoaguable state. Intubated, supine position, did not tolerate weaning. Tube feeds, no fevers. Exam assisted by nurse  Neck supple  Chest equal expansion  Abdomen ND  Extrem no new changes  Expression symmetrical/sedated. fungal dermatitis groin area and under breasts. No increased secretions  Wakes up from sedation        Lab Results   Component Value Date    WBC 16.4 (H) 11/04/2021    HGB 11.6 (L) 11/04/2021    HCT 34.7 (L) 11/04/2021    MCV 97.2 11/04/2021     11/04/2021     Lab Results   Component Value Date     11/04/2021    K 3.3 11/04/2021     11/04/2021    CO2 19 11/04/2021    BUN 74 11/04/2021    CREATININE 2.16 11/04/2021    CREATININE 2.4 11/04/2021    GLUCOSE 122 11/04/2021    GLUCOSE 108 05/18/2012    CALCIUM 8.2 11/04/2021        WBC trends are being monitored. Antibiotic doses are being adjusted per most recent renal labs.      Vitals:    11/04/21 0737   BP:    Pulse: 64   Resp:    Temp:    SpO2: 96%           Patient Active Problem List   Diagnosis    Polyarthralgia    HTN (hypertension)    Gout    GERD (gastroesophageal reflux disease)    Dyslipidemia    Irritable bowel syndrome    Obesity, Class III, BMI 40-49.9 (morbid obesity) ([facility])    Migraine headache    Microscopic hematuria    Splenic mass    Allergic rhinitis    CKD (chronic kidney disease) stage 3, GFR 30-59 ml/min ([facility])    Asthma    JARON (obstructive sleep apnea)    Anxiety    OA (osteoarthritis) of knee    Midsternal chest pain    Palpitation    Bunion    Carpal tunnel syndrome    Chronic maxillary sinusitis    Melancholia    Ganglion of tendon sheath    Glaucoma suspect    Acquired hallux rigidus    Hypermetropia    Lateral epicondylitis    Multiple-type hyperlipidemia    Nuclear senile cataract    Primary localized osteoarthrosis of ankle and foot    Sensory hearing loss, bilateral    Swelling, mass, or lump in head and neck    Menopausal symptom    Tear film insufficiency    Skin cancer    Pneumonia    Moderate persistent asthma, uncomplicated    IJCKF-28    Acute respiratory failure with hypoxia (HCC)    [facility]coagulable state ([facility] [address]    E. coli UTI           ASSESSMENT:  · Critical COVID-19 pneumonia  · Acute respiratory failure with hypoxia   · Hypercoagulable state  · Acute lower UTI with E. Coli  · Fungal dermatitis      PLAN:  Remdesivir x 10 days ( started 10/28)  IV Azactam for E coli UTI x 7 days total.   S/p Actemra  Supportive care  Fluconazole x 3 days    Discussed with critical care team    Imaging and labs were reviewed per medical records and any ID pertinent labs were also addressed    Sharri Baker DO

## 2021-11-04 NOTE — CARE COORDINATION
INTERDISCIPLINARY ROUNDING    November 4, 2021 at 0930 AM EDT    Anticipated Discharge Date: TBD based on outcomes of care. Anticipated Discharge Disposition: TBD based on DC needs - will likely need SNF at a minimum. Patient Mobility or PT/OT ordered: NA    Readmission Risk              Risk of Unplanned Readmission:  30         Discussed patient goal for the day, patient clinical progression, and barriers to discharge. The following Goal(s) of the Day/Commitment(s) have been identified:  Outcomes Documented in Discipline-specific Documentation. Updates / Report:     - Patient remains on vent, 50% FIO2. - Sedation with fent/propofol/precedex per orders.   - Patient proning per protocol, is currently supine w/ SPO2 94-96%. - Patient becomes anxious, hypotensive, and tachycardic during sedation weans.   - Creatinine is improving, remdes administration per ID.   - CT abd 11/3 showed no acute processes. - Patient with liquid stools per RN notes, otherwise tolerating tube feeds.   - DC plan TBD based on outcomes of care and DC needs.     Rambo Acosta RN  November 4, 2021

## 2021-11-04 NOTE — PROGRESS NOTES
Palliative Care Progress Note  Patient: Doug Lou  Gender: female  YOB: 1945  Unit/Bed: IC06/IC06-01  Code Status: Full Code  Inpatient Treatment Team: Treatment Team: Attending Provider: Nitish Mott DO; Consulting Physician: Kylee Ordoñez DO; Utilization Reviewer: Kristin Stiles, RN; Consulting Physician: Abdelrahman Meyers MD; [address] [facility] Nurse: Kristin Stiles RN; : Sammie Monk; Consulting Physician: Aura May DO; Registered Nurse: Lalitha Obando, RN; Registered Nurse: Amina Clark, RN  Admit Date:  10/27/2021    Chief Complaint: Goals of Care    History of Presenting Illness:      Doug Lou is a 68 y.o. female on hospital day 8 with a history of  asthma, not chronically on supplemental O2, CKD, GERD, hypertension, hyperlipidemia, gout, JARON with morbid obesity, and chronic pain. Patient is not vaccinated. Presented to ED for progressively worsening shortness of breath and tested positive for COVID-19. In the emergency department PF ratio was 96 she required 100% FiO2 via non-rebreather. T-max 99.1 respirations 33 pulse 91 blood pressure 184/65. Base metabolic panel shows a sodium 127 with a creatinine of 1.25. Mild bump in AST at 61 ALT 30. ABG done showed a pH of 7.36 PCO2 35 PO2 59 and bicarb of 20 this is on 100% FiO2 with O2 sat of 90%. Chest CT shows diffuse bilateral infiltrates throughout the lung fields not present on CT chest done in August. Code status was discussed in ED as she was adamant against intubation or CPR. However she changed her mind and requested FULL CODE. Currently She is on BiPAP, she feels short of breath, denies pain she is currently on 85% FiO2, saturation 88 to 90%, no fever. On IV Remdesivir, Decadron, Treated for E. Coli UTI. On precedex drip for anxiety and agitation. Morphine for pain and dyspnea. Not eating well due to anorexia and being on BiPAP, but is ordered a normal diet.      11/2/21 bruise/bleed easily. Psychiatric/Behavioral: Negative for agitation, behavioral problems, confusion, decreased concentration, dysphoric mood, hallucinations, self-injury, sleep disturbance and suicidal ideas. The patient is not nervous/anxious and is not hyperactive. Physical Examination:       BP (!) 85/30   Pulse 64   Temp 98.1 °F (36.7 °C)   Resp 28   Ht 5' 5\" (1.651 m)   Wt 293 lb (132.9 kg)   SpO2 96%   BMI 48.76 kg/m²    Physical Exam  Constitutional:       General: She is not in acute distress. Appearance: She is ill-appearing. She is not diaphoretic. Interventions: She is sedated and intubated. HENT:      Head: Normocephalic and atraumatic. Right Ear: External ear normal.      Left Ear: External ear normal.      Nose: Nose normal.      Mouth/Throat:      Pharynx: No oropharyngeal exudate. Eyes:      General: No scleral icterus. Right eye: No discharge. Left eye: No discharge. Conjunctiva/sclera: Conjunctivae normal.      Pupils: Pupils are equal, round, and reactive to light. Neck:      Thyroid: No thyromegaly. Vascular: No JVD. Trachea: No tracheal deviation. Cardiovascular:      Rate and Rhythm: Normal rate and regular rhythm. Heart sounds: Normal heart sounds. Pulmonary:      Effort: Pulmonary effort is normal. Tachypnea present. No respiratory distress. She is intubated. Breath sounds: No stridor. Decreased breath sounds and rhonchi present. No wheezing or rales. Chest:      Chest wall: No tenderness. Abdominal:      General: Bowel sounds are normal. There is no distension. Palpations: Abdomen is soft. There is no mass. Tenderness: There is no abdominal tenderness. There is no guarding or rebound. Musculoskeletal:         General: No tenderness or deformity. Normal range of motion. Cervical back: Normal range of motion and neck supple. Lymphadenopathy:      Cervical: No cervical adenopathy.    Skin: General: Skin is warm and dry. Findings: No erythema or rash. Neurological:      Mental Status: She is oriented to person, place, and time. Psychiatric:         Behavior: Behavior normal.         Thought Content: Thought content normal.         Allergies: Allergies   Allergen Reactions    Augmentin [Amoxicillin-Pot Clavulanate] Other (See Comments)     Lightheadedness and dizziness    Aspartame      welts    Atorvastatin Calcium     Carbapenems     Cephalexin Other (See Comments)     Mood, gi upset     Clonidine Derivatives     Diclofenac Sodium     Doxycycline Hyclate     Enalapril     Fexofenadine Hydrochloride     Flagyl [Metronidazole]     Imipenem     Levofloxacin Other (See Comments)     Nightmares     Lisinopril     Macrolides And Ketolides     Metronidazole Hcl     Nsaids     Oxaprozin     Oxycodone-Acetaminophen      Other reaction(s):  Other: See Comments  Pt states she gets gout    Paroxetine     Sulfa Antibiotics     Tape Margrett Fabiola Tape]     Tramadol Other (See Comments)       Medications:      Current Facility-Administered Medications   Medication Dose Route Frequency Provider Last Rate Last Admin    0.9 % sodium chloride bolus  250 mL IntraVENous Once Shashank Salgado DO        clonazePAM (KLONOPIN) tablet 0.5 mg  0.5 mg Oral TID MICHOACANO Barber CNP        lactulose (CHRONULAC) 10 GM/15ML solution 20 g  20 g Oral 4 times per day MICHOACANO Barber CNP   20 g at 11/04/21 0551    labetalol (NORMODYNE;TRANDATE) injection 10 mg  10 mg IntraVENous Q4H PRN MICHOACANO Barber CNP   10 mg at 11/04/21 0950    dextrose 5 % in lactated ringers infusion   IntraVENous Continuous Shashank Salgado  mL/hr at 11/04/21 0755 New Bag at 11/04/21 0755    potassium chloride (KLOR-CON M) extended release tablet 40 mEq  40 mEq Oral Once Micah Raymond MD        aztreonam (AZACTAM) 1000 mg IVPB minibag  1,000 mg IntraVENous Q12H Aris Bautista MD   Stopped at 11/04/21 0533    sennosides-docusate sodium (SENOKOT-S) 8.6-50 MG tablet 1 tablet  1 tablet Oral BID Jailene Cheng MD   1 tablet at 11/04/21 0749    miconazole (MICOTIN) 2 % powder   Topical BID Refugio Sherman MD   Given at 11/03/21 2001    potassium chloride (KLOR-CON M) extended release tablet 60 mEq  60 mEq Oral Once Refugio Sherman MD        fentaNYL (SUBLIMAZE) 1,000 mcg in sodium chloride 0.9 % 100 mL infusion  12.5-200 mcg/hr IntraVENous Continuous Rita Tyson, APRN - CNP 10 mL/hr at 11/04/21 0355 100 mcg/hr at 11/04/21 0355    propofol injection  5-50 mcg/kg/min IntraVENous Titrated Rita Tyson, APRN - CNP 24 mL/hr at 11/04/21 0638 30 mcg/kg/min at 11/04/21 0638    chlorhexidine (PERIDEX) 0.12 % solution 15 mL  15 mL Mouth/Throat BID Rita Tyson, APRN - CNP   15 mL at 11/04/21 0812    pantoprazole (PROTONIX) injection 40 mg  40 mg IntraVENous Daily Rita Tyson, APRN - CNP   40 mg at 11/04/21 0749    And    sodium chloride (PF) 0.9 % injection 10 mL  10 mL IntraVENous Daily Rita Tyson, APRN - CNP   10 mL at 11/04/21 0755    norepinephrine (LEVOPHED) 16 mg in dextrose 5% 250 mL infusion  2-100 mcg/min IntraVENous Continuous Rita Tyson, APRN - CNP   Stopped at 11/03/21 2300    [Held by provider] carvedilol (COREG) tablet 12.5 mg  12.5 mg Oral BID  Lara Bee, DO   12.5 mg at 11/01/21 1010    morphine (PF) injection 2 mg  2 mg IntraVENous Q1H PRN Ilean Sat, DO   2 mg at 11/01/21 0407    hydrALAZINE (APRESOLINE) injection 10 mg  10 mg IntraVENous Q4H PRN Ilean Sat, DO   10 mg at 11/01/21 1036    Or    hydrALAZINE (APRESOLINE) injection 20 mg  20 mg IntraVENous Q4H PRN Ilean Sat, DO   20 mg at 11/04/21 0130    [Held by provider] valsartan (DIOVAN) tablet 320 mg  320 mg Oral Daily Brandon North DO   320 mg at 11/01/21 1008    pravastatin (PRAVACHOL) tablet 40 mg  40 mg Oral Nightly Brandon North DO   40 mg at 11/03/21 2003    citalopram (CELEXA) tablet 20 mg 20 mg Oral Daily Larri Vasu Sedar, DO   20 mg at 11/04/21 0749    aspirin chewable tablet 81 mg  81 mg Oral Daily Larri Vasu Sedar, DO   81 mg at 11/04/21 0749    fluticasone (FLONASE) 50 MCG/ACT nasal spray 2 spray  2 spray Each Nostril Daily Larri Vasu Sedar, DO   2 spray at 10/28/21 1357    remdesivir 100 mg in sodium chloride 0.9 % 250 mL IVPB  100 mg IntraVENous Q24H Treasure Aguayo MD   Stopped at 11/03/21 1059    sennosides-docusate sodium (SENOKOT-S) 8.6-50 MG tablet 2 tablet  2 tablet Oral Daily PRN Beba Castillo,         enoxaparin (LOVENOX) injection 30 mg  30 mg SubCUTAneous BID Brandon D Sedar, DO   30 mg at 11/04/21 0749    dexamethasone (DECADRON) injection 6 mg  6 mg IntraVENous Q24H Larri Vasu Sedar, DO   6 mg at 11/04/21 0750    dexmedetomidine (PRECEDEX) 400 mcg in sodium chloride 0.9 % 100 mL infusion  0.2-1.4 mcg/kg/hr IntraVENous Continuous Larri Vasu Sedar, DO 44.5 mL/hr at 11/01/21 0526 1.4 mcg/kg/hr at 11/01/21 0526       History:       PMHx:  Past Medical History:   Diagnosis Date    Allergic rhinitis 9/18/2013    Anxiety 9/18/2013    Asthma     Carpal tunnel syndrome 5/19/2015    Cellulitis of toe, right 6/5/2012    Cholelithiasis, common bile duct 1/10/2012    CKD (chronic kidney disease) stage 3, GFR 30-59 ml/min ([facility]) 9/18/2013    Depression     Dyslipidemia 11/26/2011    GERD (gastroesophageal reflux disease)     Gout 10/24/2011    HTN (hypertension) 10/24/2011    Hypertension     Irritable bowel syndrome 11/26/2011    Microscopic hematuria 12/13/2012    Midsternal chest pain     Migraine headache 2/22/2012    Multiple-type hyperlipidemia 5/19/2015    Muscle spasm 10/24/2011    Obesity, Class III, BMI 40-49.9 (morbid obesity) ([facility] [address] 11/26/2011    JARON (obstructive sleep apnea) 9/18/2013    Osteoarthritis     Other activity(E029.9)     sensative to vicryl sutures    Palpitations     Polyarthralgia 10/24/2011    Screening mammogram     Skin cancer 10/10/2016    Splenic mass 2012       PSHx:  Past Surgical History:   Procedure Laterality Date    BUNIONECTOMY      removal    CARDIOVASCULAR STRESS TEST      2011 normal    CHOLECYSTECTOMY      HYSTERECTOMY      SINUS SURGERY      TONSILLECTOMY         Social Hx:  Social History     Socioeconomic History    Marital status:      Spouse name: None    Number of children: None    Years of education: None    Highest education level: None   Occupational History    None   Tobacco Use    Smoking status: Former Smoker     Quit date: 1974     Years since quittin.3    Smokeless tobacco: Never Used   Vaping Use    Vaping Use: Never used   Substance and Sexual Activity    Alcohol use: No     Comment: quit     Drug use: No    Sexual activity: None   Other Topics Concern    None   Social History Narrative    None     Social Determinants of Health     Financial Resource Strain:     Difficulty of Paying Living Expenses:    Food Insecurity:     Worried About Running Out of Food in the Last Year:     Ran Out of Food in the Last Year:    Transportation Needs:     Lack of Transportation (Medical):      Lack of Transportation (Non-Medical):    Physical Activity:     Days of Exercise per Week:     Minutes of Exercise per Session:    Stress:     Feeling of Stress :    Social Connections:     Frequency of Communication with Friends and Family:     Frequency of Social Gatherings with Friends and Family:     Attends Advent Services:     Active Member of Clubs or Organizations:     Attends Club or Organization Meetings:     Marital Status:    Intimate Partner Violence:     Fear of Current or Ex-Partner:     Emotionally Abused:     Physically Abused:     Sexually Abused:        Family Hx:  Family History   Problem Relation Age of Onset    Heart Disease Mother     Cancer Father         lung       LABS: Reviewed     CBC:  Lab Results   Component Value Date    WBC 16.4 2021    RBC 3.57 11/04/2021    RBC 4.15 05/18/2012    HGB 11.6 11/04/2021    HCT 34.7 11/04/2021    MCV 97.2 11/04/2021    MCH 32.5 11/04/2021    MCHC 33.4 11/04/2021    RDW 14.0 11/04/2021     11/04/2021    MPV 9.7 05/20/2015     CBC with Differential:   Lab Results   Component Value Date    WBC 16.4 11/04/2021    RBC 3.57 11/04/2021    RBC 4.15 05/18/2012    HGB 11.6 11/04/2021    HCT 34.7 11/04/2021     11/04/2021    MCV 97.2 11/04/2021    MCH 32.5 11/04/2021    MCHC 33.4 11/04/2021    RDW 14.0 11/04/2021    NRBC 1 11/02/2021    LYMPHOPCT 3.0 11/04/2021    MONOPCT 4.8 11/04/2021    MYELOPCT 2 11/04/2021    EOSPCT 3.8 12/28/2011    BASOPCT 0.1 11/04/2021    MONOSABS 0.8 11/04/2021    LYMPHSABS 0.5 11/04/2021    EOSABS 0.0 11/04/2021    BASOSABS 0.0 11/04/2021     CMP:    Lab Results   Component Value Date     11/04/2021    K 3.3 11/04/2021     11/04/2021    CO2 19 11/04/2021    BUN 74 11/04/2021    CREATININE 2.16 11/04/2021    CREATININE 2.4 11/04/2021    GFRAA 26.8 11/04/2021    LABGLOM 22.1 11/04/2021    GLUCOSE 122 11/04/2021    GLUCOSE 108 05/18/2012    PROT 4.9 11/04/2021    LABALBU 2.8 11/04/2021    LABALBU 4.2 05/18/2012    CALCIUM 8.2 11/04/2021    BILITOT 0.5 11/04/2021    ALKPHOS 55 11/04/2021    AST 38 11/04/2021    ALT 43 11/04/2021     BMP:    Lab Results   Component Value Date     11/04/2021    K 3.3 11/04/2021     11/04/2021    CO2 19 11/04/2021    BUN 74 11/04/2021    LABALBU 2.8 11/04/2021    LABALBU 4.2 05/18/2012    CREATININE 2.16 11/04/2021    CREATININE 2.4 11/04/2021    CALCIUM 8.2 11/04/2021    GFRAA 26.8 11/04/2021    LABGLOM 22.1 11/04/2021    GLUCOSE 122 11/04/2021    GLUCOSE 108 05/18/2012     TSH:   Lab Results   Component Value Date    TSH 1.950 03/26/2014     Vitamin B12 and Folate: No components found for: FOLIC,  L25  Urinalysis:   Lab Results   Component Value Date    NITRU Negative 10/27/2021    WBCUA 10-20 10/27/2021    BACTERIA MODERATE 10/27/2021    RBCUA 5-10 10/27/2021    BLOODU Moderate 10/27/2021    SPECGRAV >=1.030 10/27/2021    GLUCOSEU Negative 10/27/2021    GLUCOSEU NEG 12/17/2011           FUNCTIONAL ADL´S:     Independent: [ x ] Eating, [   ] Dressing, [   ] Transferring, [   ] Raynald Mauri, [   ] [place], [   ] Continence  Dependent   : [  ] Eating, [   ] Dressing, [   ] Transferring, [   ] Raynald Mauri, [   ] [place], [   ] Continence  W. assistant : [  ] Eating, [ x  ] Dressing, [  x ] Transferring, [ x  ] Toileting, [x   ] Bathing, [ x  ] Continence    Radiology: Reviewed      No results found. Assessment and plan:      -Advance Care Planning  Discussed goals of care with patient. Explained in extensive detail nuances between full code, DNR CCA and DNR CC. As she became sicker, she feels she is not ready to die, and after much discussion about possible benefits vs risks of intubation with ventilation and what her options would be if she was unable to be weaned off the vent. Qing Whitlock has made the decision to be Full Code  MPOA in Place  She is now willing to be intubated, trached, and pegged      -Goals of Care Discussion:  Disease process and goals of treatment were discussed in basic terms. Her  goal is to optimize available comfort care measures to decrease hospitalizations and maximize function. We discussed the palliative care philosophy in light of those goals. We discussed all care options contingent on treatment response and QOL. Much active listening, presence, and emotional support were given. Due to severe COVID infection not responding to aggressive treatment, acute hypoxia with respiratory failure, inability to maintain her own airway, hypokalemia of 3.0, she is eligible for hospice should she choose to stop aggressive treatment. However she is choosing to escalate her treatment, palliative care will continue to follow. Thank you for allowing me to participate in  Her care.       11/2/21     Attempted to call SHAGUFTA

## 2021-11-04 NOTE — PROGRESS NOTES
Pulmonary & Critical Care Medicine ICU Progress Note  Chief complaint : COVID-19 pneumonia, acute respiratory failure    Subjunctive/24 hour events :   Patient seen and examined during multidisciplinary rounds with RN, charge nurse, RT, pharmacy, dietitian, and social service. Sedated, currently supine, she open her eyes to verbal stimuli however did not follow command, she is on 50% FiO2, and 8 of PEEP, saturation 96%, no fever overnight, she is off pressors, urine output 650 cc, she is +1.6 L, tolerates tube feed, +  bowel movement    Social History     Tobacco Use    Smoking status: Former Smoker     Quit date: 1974     Years since quittin.3    Smokeless tobacco: Never Used   Substance Use Topics    Alcohol use: No     Comment: quit          Problem Relation Age of Onset    Heart Disease Mother     Cancer Father         lung       Recent Labs     216 21  0408   PHART 7.329* 7.314*   [id number] 37 41   PO2ART 92* 65*       MV Settings:  Vent Mode: AC/VC Rate Set: 28 bmp/Vt Ordered: 370 mL/ /FiO2 : 50 %           IV:   dextrose 5% in lactated ringers 100 mL/hr at 21 2250    fentaNYL (SUBLIMAZE) infusion 100 mcg/hr (21 0355)    propofol 30 mcg/kg/min (21 4488)    norepinephrine Stopped (21 2300)    dexmedetomidine 1.4 mcg/kg/hr (21 05)       Vitals:  BP (!) 85/30   Pulse 64   Temp 98.1 °F (36.7 °C)   Resp 28   Ht 5' 5\" (1.651 m)   Wt 293 lb (132.9 kg)   SpO2 96%   BMI 48.76 kg/m²    Tmax:        Intake/Output Summary (Last 24 hours) at 2021 0749  Last data filed at 2021 0555  Gross per 24 hour   Intake 3281 ml   Output 650 ml   Net 2631 ml       EXAM:  General: On vent, sedated, no distress  Head: normocephalic, atraumatic  Eyes:No gross abnormalities.   ENT:  MMM no lesions, ET and OG tube in  Neck:  supple and no masses  Chest : Vent sounds, bilateral rales, no wheezing, nontender, tympanic  Heart[de-identified] Heart sounds are normal.  Regular rate and rhythm without murmur, gallop or rub. ABD:  symmetric, soft, non-tender, no guarding or rebound  Musculoskeletal : no cyanosis, no clubbing and no edema  Neuro: Sedated, open her eyes to verbal stimuli, did not follow commands  Skin: No rashes or nodules noted. Lymph node:  no cervical nodes  Urology: Yes [place]   Psychiatric: Sedated and calm    Medications:  Scheduled Meds:   lactulose  20 g Oral 4 times per day    potassium chloride  40 mEq Oral Once    aztreonam  1,000 mg IntraVENous Q12H    sennosides-docusate sodium  1 tablet Oral BID    miconazole   Topical BID    potassium chloride  60 mEq Oral Once    chlorhexidine  15 mL Mouth/Throat BID    pantoprazole  40 mg IntraVENous Daily    And    sodium chloride (PF)  10 mL IntraVENous Daily    [Held by provider] carvedilol  12.5 mg Oral BID WC    [Held by provider] valsartan  320 mg Oral Daily    pravastatin  40 mg Oral Nightly    citalopram  20 mg Oral Daily    aspirin  81 mg Oral Daily    fluticasone  2 spray Each Nostril Daily    remdesivir IVPB  100 mg IntraVENous Q24H    enoxaparin  30 mg SubCUTAneous BID    dexamethasone  6 mg IntraVENous Q24H       PRN Meds:  labetalol, morphine, hydrALAZINE **OR** hydrALAZINE, sennosides-docusate sodium    Results: reviewed by me   CBC:   Recent Labs     11/02/21  0600 11/02/21  1001 11/03/21  0600 11/03/21  0956 11/03/21  2146 11/04/21  0408 11/04/21  0526   WBC 16.9*  --  17.0*  --   --   --  16.4*   HGB 13.4   < > 12.8   < > 11.4* 11.0* 11.6*   HCT 39.8  --  37.8  --   --   --  34.7*   MCV 96.5  --  97.3  --   --   --  97.2     --  312  --   --   --  256    < > = values in this interval not displayed.      BMP:   Recent Labs     11/02/21  0600 11/02/21  1001 11/03/21  0600 11/03/21  0956 11/03/21  1314 11/03/21  1705 11/03/21 2146 11/04/21  0408 11/04/21  0527     --  133*  --   --   --   --   --  135   K 3.6  --  3.1*  --  3.4  --   --   --  3.3*     -- 96  --   --   --   --   --  100   CO2 18*  --  17*  --   --   --   --   --  19*   BUN 55*  --  71*  --   --   --   --   --  74*   CREATININE 1.72*   < > 2.58*   < >  --    < > 2.2* 2.4* 2.16*    < > = values in this interval not displayed. LIVER PROFILE:   Recent Labs     11/02/21  0600 11/03/21  0600 11/04/21  0527   AST 56* 34 38*   ALT 56* 46* 43*   BILITOT 0.7 0.6 0.5   ALKPHOS 75 62 55     PT/INR: No results for input(s): PROTIME, INR in the last 72 hours. APTT: No results for input(s): APTT in the last 72 hours. UA:No results for input(s): NITRITE, COLORU, PHUR, LABCAST, WBCUA, RBCUA, MUCUS, TRICHOMONAS, YEAST, BACTERIA, CLARITYU, SPECGRAV, LEUKOCYTESUR, UROBILINOGEN, BILIRUBINUR, BLOODU, GLUCOSEU, AMORPHOUS in the last 72 hours. Invalid input(s): [place]    Cultures:  Negative so far  Films:  CXR reviewed by me and it showed bilateral basal groundglass changes, and pleural effusions      Assessment:   This is a critically ill patient at risk of deterioration / death , needing close ICU monitoring and intervention due to below noted problems     · Acute hypoxic respiratory failure  · Severe COVID-19 pneumonia  · Asthma, no signs of exacerbation  · Septic shock  · Obesity  · Hypertension, currently hypotensive on Levophed  · Acute kidney injury  · Hypercoagulable state    Recommendation  · Vent support lung protective strategy  · head of the bed 30°  · Sedation holiday today  · Daily breathing trials, patient failed breathing trial yesterday with tachycardia and tachypnea and subsequent hypoxia  · Patient very anxious when we wake her up for breathing trial, will start Klonopin 0.5 3 times daily  · Sedation with combination propofol and fentanyl target R ASS of 0 to -1  · Watch for ICU delirium: TV on, natural light, avoid benzos, pain control, early mobility, and family engagement  · PUD prophylaxis  · DVT prophylaxis  · Dexamethasone 6 mg daily for 10 days  · Remdesivir extended course per ID for 10 days, consider discontinue due to worsening renal function, will defer to ID  · Monitor renal function and urine output  · Monitor and replace electrolytes  · Chest x-ray tomorrow   · Target blood sugar 140180  · Currently off pressors  · Antibiotics per ID        Due to the immediate potential for life-threatening deterioration due to acute respiratory failure I spent 35  minutes providing critical care.  This time is excluding time spent performing procedures.           Electronically signed by Priyank Farnsworth MD,  [facility] ,on 11/4/2021 at 7:49 AM

## 2021-11-04 NOTE — PROGRESS NOTES
Nephrology Progress Note    Assessment:  MIRELLA dehydration stable  Hypokalemia                         Hypertension  Ventilator dependent  Covid-19  E Coli UTI  Metabolic acidosis      Plan: maintain hydration IV/Tube feedings    Patient Active Problem List:     Polyarthralgia     HTN (hypertension)     Gout     GERD (gastroesophageal reflux disease)     Dyslipidemia     Irritable bowel syndrome     Obesity, Class III, BMI 40-49.9 (morbid obesity) ([facility])     Migraine headache     Microscopic hematuria     Splenic mass     Allergic rhinitis     CKD (chronic kidney disease) stage 3, GFR 30-59 ml/min ([facility])     Asthma     JARON (obstructive sleep apnea)     Anxiety     OA (osteoarthritis) of knee     Midsternal chest pain     Palpitation     Bunion     Carpal tunnel syndrome     Chronic maxillary sinusitis     Melancholia     Ganglion of tendon sheath     Glaucoma suspect     Acquired hallux rigidus     Hypermetropia     Lateral epicondylitis     Multiple-type hyperlipidemia     Nuclear senile cataract     Primary localized osteoarthrosis of ankle and foot     Sensory hearing loss, bilateral     Swelling, mass, or lump in head and neck     Menopausal symptom     Tear film insufficiency     Skin cancer     Pneumonia     Moderate persistent asthma, uncomplicated     TGGFH-16     Acute respiratory failure with hypoxia ([facility])     Hypercoagulable state ([facility] [address]     E. coli UTI      Subjective:  Admit Date: 10/27/2021    Interval History: eyes open no responce    Medications:  Scheduled Meds:   sodium chloride  250 mL IntraVENous Once    clonazePAM  0.5 mg Oral TID    lactulose  20 g Oral 4 times per day    potassium chloride  40 mEq Oral Once    aztreonam  1,000 mg IntraVENous Q12H    sennosides-docusate sodium  1 tablet Oral BID    miconazole   Topical BID    potassium chloride  60 mEq Oral Once    chlorhexidine  15 mL Mouth/Throat BID    pantoprazole  40 mg IntraVENous Daily    And    sodium chloride (PF)  10 mL IntraVENous Daily    [Held by provider] carvedilol  12.5 mg Oral BID WC    [Held by provider] valsartan  320 mg Oral Daily    pravastatin  40 mg Oral Nightly    citalopram  20 mg Oral Daily    aspirin  81 mg Oral Daily    fluticasone  2 spray Each Nostril Daily    remdesivir IVPB  100 mg IntraVENous Q24H    enoxaparin  30 mg SubCUTAneous BID    dexamethasone  6 mg IntraVENous Q24H     Continuous Infusions:   dextrose 5% in lactated ringers 100 mL/hr at 11/04/21 0755    fentaNYL (SUBLIMAZE) infusion 100 mcg/hr (11/04/21 0355)    propofol 30 mcg/kg/min (11/04/21 0638)    norepinephrine Stopped (11/03/21 2300)    dexmedetomidine 1.4 mcg/kg/hr (11/01/21 0526)       CBC:   Recent Labs     11/03/21  0600 11/03/21  0956 11/04/21  0408 11/04/21  0526   WBC 17.0*  --   --  16.4*   HGB 12.8   < > 11.0* 11.6*     --   --  256    < > = values in this interval not displayed. CMP:    Recent Labs     11/02/21  0600 11/02/21  1001 11/03/21  0600 11/03/21  0956 11/03/21  1314 11/03/21  1705 11/03/21  2146 11/04/21  0408 11/04/21  0527     --  133*  --   --   --   --   --  135   K 3.6  --  3.1*  --  3.4  --   --   --  3.3*     --  96  --   --   --   --   --  100   CO2 18*  --  17*  --   --   --   --   --  19*   BUN 55*  --  71*  --   --   --   --   --  74*   CREATININE 1.72*   < > 2.58*   < >  --    < > 2.2* 2.4* 2.16*   GLUCOSE 92  --  94  --   --   --   --   --  122*   CALCIUM 8.5  --  8.3*  --   --   --   --   --  8.2*   LABGLOM 28.8*   < > 18.0*   < >  --    < > 22* 20* 22.1*    < > = values in this interval not displayed. Troponin: No results for input(s): TROPONINI in the last 72 hours. BNP: No results for input(s): BNP in the last 72 hours. INR: No results for input(s): INR in the last 72 hours. Lipids: No results for input(s): CHOL, LDLDIRECT, TRIG, HDL, AMYLASE, LIPASE in the last 72 hours.   Liver:   Recent Labs     11/04/21  0527   AST 38*   ALT 43*   ALKPHOS 55   PROT 4.9* LABALBU 2.8*   BILITOT 0.5     Iron:  No results for input(s): IRONS, FERRITIN in the last 72 hours. Invalid input(s): LABIRONS  Urinalysis: No results for input(s): UA in the last 72 hours.     Objective:  Vitals: BP (!) 85/30   Pulse 64   Temp 98.1 °F (36.7 °C)   Resp 28   Ht 5' 5\" (1.651 m)   Wt 293 lb (132.9 kg)   SpO2 96%   BMI 48.76 kg/m²    Wt Readings from Last 3 Encounters:   11/01/21 293 lb (132.9 kg)   08/27/21 280 lb (127 kg)   07/24/21 289 lb (131.1 kg)      24HR INTAKE/OUTPUT:      Intake/Output Summary (Last 24 hours) at 11/4/2021 1019  Last data filed at 11/4/2021 0555  Gross per 24 hour   Intake 3281 ml   Output 650 ml   Net 2631 ml       General: alert, in no apparent distress  HEENT: normocephalic, atraumatic, anicteric  Neck: supple, no mass  Lungs: non-labored respirations, clear to auscultation bilaterally  Heart: regular rate and rhythm, no murmurs or rubs  Abdomen: soft, non-tender, non-distended  Ext: no cyanosis, no peripheral edema  Neuro: alert and oriented, no gross abnormalities  Psych: normal mood and affect  Skin: no rash      Electronically signed by Verenice Smith DO, MD

## 2021-11-04 NOTE — CARE COORDINATION
The  returned a phone call to [employer]. Tony Guerrero has questions about paying the patient's Lean[employer]ri Van states she has HCPOA for the patient. Tony Guerrero states she is the land lord for the patient also. The LSW called and talked with Tony Guerrero. The LSW discussed that she may want to contact an  or the courts/bank regarding assisting with the bills for the patient. Tony Guerrero appreciated the call and will call the LSW or  again if she has any additional questions.  Electronically signed by KIARRA Nunez on 11/4/21 at 2:10 PM EDT

## 2021-11-05 NOTE — PLAN OF CARE
Nutrition Problem #1: Inadequate oral intake  Intervention: Food and/or Nutrient Delivery: Modify Tube Feeding (Increase TF to goal. Peptide base TF ( Vital 1.2) to increase to goal rate of 45 ml/hr x 24 hrs  via OG.  Continue water flush 50 ml every 4 hrs)  Nutritional Goals: New goals : EN to deliver kcal/protein within range of estimated needs

## 2021-11-05 NOTE — PROGRESS NOTES
19:00-07:30 Shift summary:    Pt had uneventful night. Assessments completed (see flowsheets). Pt remains intubated and adequately-sedated. IV drips infusing/tirtated per orders (see eMAR) pt tolerating well. Pt moves all extremities purposefully. T/F infusing via OG per orders pt tolerating well. OG placement verified via respiratory status, external length of 55cm, residual of 5cc and pH. Pt had 2 liquid, brown BMs this shift. Estela care provided as needed. Bed bath and complete linen change provided. Pt tolerated well. CXR and AM labs drawn, labeled and sent to lab completed. Safety measures in place. Hand off report given to on coming RN.

## 2021-11-05 NOTE — CARE COORDINATION
INTERDISCIPLINARY ROUNDING     November 5, 2021     Anticipated Discharge Date: TBD based on outcomes of care.        Anticipated Discharge Disposition: TBD (anticipate LTACH vs SNF).    Patient Mobility or PT/OT ordered: NA     Readmission Risk              Risk of Unplanned Readmission:  30         Discussed patient goal for the day, patient clinical progression, and barriers to discharge. The following Goal(s) of the Day/Commitment(s) have been identified:  Outcomes Documented in Discipline-specific Documentation.     Updates / Report:      - Patient to have breathing trails daily - currently on CPAP mode. - Recheck ABG 2 hours post-change to CPAP mode. - Fent/propofol/precedex per orders. For sedation holiday today. - Patient proning per protocol, noted to be following some commands.              - Creatinine is elevated, renal on consult. - Patient with liquid stools per RN notes.    - Tolerating tube feeds.              - DC plan TBD based on outcomes of care and DC needs.       Darrell Lares RN  November 5, 2021

## 2021-11-05 NOTE — PROGRESS NOTES
Comprehensive Nutrition Assessment    Type and Reason for Visit:  Reassess    Nutrition Recommendations/Plan:   Increase TF to goal.   Peptide base TF ( Vital 1.2) to increase to goal rate of 45 ml/hr x 24 hrs  via OG. Continue water flush 50 ml every 4 hrs  Monitor for changes in propofol rate for adjustments to EN goal rate  Monitor renal labs for adjustments in protein goals, ability to add protein modulars    Nutrition Assessment:  Pt progressing to mild malnutrition related to acute illness ( COIVD), > 7 days meeting < 75% estimated energy/protein needs. Propofol rate significantly decreased , will increase TF to goal. Noted increasing BUN/creat levels, will continue to monitor and add protein modulars to meet protein needs as able    Malnutrition Assessment:  Malnutrition Status:  Mild malnutrition    Context:  Acute Illness     Findings of the 6 clinical characteristics of malnutrition:  Energy Intake:  7 - 50% or less of estimated energy requirements for 5 or more days  Weight Loss:  Unable to assess     Body Fat Loss:  Unable to assess     Muscle Mass Loss:  Unable to assess    Fluid Accumulation:  1 - Mild     Strength:  Not Performed    Estimated Daily Nutrient Needs:  Energy (kcal):  2602-9599 (kg x 8-11); Weight Used for Energy Requirements:  Current (132.9 kg)     Protein (g):  85-142g protein ( 1.5-2.5 g/kg IBW) monitor renal labs; Weight Used for Protein Requirements:  Ideal (56.8 kg)        Fluid (ml/day):  ~1200 ml (or as per MD);  Method Used for Fluid Requirements:  1 ml/kcal      Nutrition Related Findings:  intubted 11/1, trophic TF started and appears well tolerated, propofol now @ 8 ml/hr ( ~ 200 kcals) failed weaning trial today due to encephalopathy/inability to follow commands , + loose stools ( chornulac/sennokot), 2+ pitting generalized edema continues, unable to assess for any weight changes, currently pt > UBW, noted BUN/ creat continue to increase, I/O = 4475/550 neoprolgy following      Wounds:  None       Current Nutrition Therapies:    Diet NPO  ADULT TUBE FEEDING; Orogastric; Peptide Based; Continuous; 20; No; 50; Q 4 hours  Current Tube Feeding (TF) Orders:  · Feeding Route:  OG  · Formula: Peptide Based  · Schedule: Continuous @ 20, to increase to 45 ml/hr  · Water Flushes: 50 ml every 4 hrs (300 ml)  · Current TF & Flush Orders Provides: 576 kcal, 36 gm protein, ~ 700 ml free water  · Goal TF & Flush Orders Provides: with increase to 45 ml/hr = 1296 kcals ( + propofol kcals), 81 g protein, 1175 ml free water    Anthropometric Measures:  · Height: 5' 5\" (165.1 cm)  · Current Body Weight: 283 lb (128.4 kg) (11/5 * edema present)   · Admission Body Weight: 279 lb (126.6 kg) (? source)    · Usual Body Weight: 280 lb (127 kg) (7/24/21-office visit)     · Ideal Body Weight: 125 lbs;   · BMI: 47.1    · BMI Categories: Obese Class 3 (BMI 40.0 or greater)       Nutrition Diagnosis:   · Inadequate oral intake related to impaired respiratory function as evidenced by intubation    Nutrition Interventions:   Food and/or Nutrient Delivery:  Modify Tube Feeding (Increase TF to goal. Peptide base TF ( Vital 1.2) to increase to goal rate of 45 ml/hr x 24 hrs  via OG. Continue water flush 50 ml every 4 hrs)  Nutrition Education/Counseling:  No recommendation at this time   Coordination of Nutrition Care:  Continue to monitor while inpatient    Goals:  New goals : EN to deliver kcal/protein within range of estimated needs       Nutrition Monitoring and Evaluation:     Food/Nutrient Intake Outcomes:  Enteral Nutrition Intake/Tolerance  Physical Signs/Symptoms Outcomes:  Biochemical Data, Hemodynamic Status, Weight     Discharge Planning:     Too soon to determine     Electronically signed by Ruchi Andrade RD, LD on 11/5/21 at 1:10 PM EDT

## 2021-11-05 NOTE — PROGRESS NOTES
Hospitalist Progress Note      PCP: Adwoa Spivey MD    Date of Admission: 10/27/2021    Chief Complaint:      Patient was seen. Discussed with RN. She is off of sedation however she still not following commands. Medications:  Reviewed    Infusion Medications    dextrose 5% in lactated ringers 100 mL/hr at 11/05/21 0425    fentaNYL (SUBLIMAZE) infusion 125 mcg/hr (11/05/21 7065)    propofol 10 mcg/kg/min (11/05/21 0757)    norepinephrine 2 mcg/min (11/05/21 0756)    dexmedetomidine 0.6 mcg/kg/hr (11/05/21 3228)     Scheduled Medications    heparin (porcine)  5,000 Units SubCUTAneous 3 times per day    clonazePAM  0.25 mg Oral BID    midazolam  2 mg IntraVENous Once    fluconazole  100 mg IntraVENous Q24H    lactulose  20 g Oral 4 times per day    potassium chloride  40 mEq Oral Once    aztreonam  1,000 mg IntraVENous Q12H    sennosides-docusate sodium  1 tablet Oral BID    miconazole   Topical BID    potassium chloride  60 mEq Oral Once    chlorhexidine  15 mL Mouth/Throat BID    pantoprazole  40 mg IntraVENous Daily    And    sodium chloride (PF)  10 mL IntraVENous Daily    [Held by provider] carvedilol  12.5 mg Oral BID WC    [Held by provider] valsartan  320 mg Oral Daily    pravastatin  40 mg Oral Nightly    citalopram  20 mg Oral Daily    aspirin  81 mg Oral Daily    fluticasone  2 spray Each Nostril Daily    remdesivir IVPB  100 mg IntraVENous Q24H    dexamethasone  6 mg IntraVENous Q24H     PRN Meds: labetalol, morphine, hydrALAZINE **OR** hydrALAZINE, sennosides-docusate sodium      Intake/Output Summary (Last 24 hours) at 11/5/2021 1209  Last data filed at 11/5/2021 0600  Gross per 24 hour   Intake 4745 ml   Output 550 ml   Net 4195 ml       Exam:    BP (!) 127/39   Pulse 79   Temp 98.1 °F (36.7 °C) (Oral)   Resp 19   Ht 5' 5\" (1.651 m)   Wt 283 lb 15.2 oz (128.8 kg)   SpO2 92%   BMI 47.25 kg/m²     General appearance: Intubated, off sedation.   Respiratory:  Equal PLEURAL EFFUSIONS. XR CHEST PORTABLE   Final Result   BILATERAL LOWER LUNG ATELECTASIS/PNEUMONIA. US DUP UPPER EXTREMITY LEFT VENOUS   Final Result   NO SONOGRAPHIC EVIDENCE, DEEP VEIN THROMBOSIS, LEFT UPPER EXTREMITY, WITH ABOVE LIMITATIONS. US DUP UPPER EXTREMITY RIGHT VENOUS   Final Result   NO SONOGRAPHIC EVIDENCE , DEEP VEIN THROMBOSIS, RIGHT UPPER EXTREMITY. US DUP LOWER EXTREMITIES BILATERAL VENOUS   Final Result   NO ULTRASOUND SIGNS OF THROMBUS IN THE BILATERAL LOWER EXTREMITY DEEP VENOUS SYSTEMS FROM THE GROINS TO THE POPLITEAL FOSSA              Assessment/Plan:    68-year-old female with history of HTN, asthma, CKD3, GERD, gout, JARON, morbid obesity who presented with:    Acute hypoxic respiratory failure   Currently intubated, off sedation, possible extubation but not following commands yet. - secondary to COVID-19 pneumonia  - - on IV Remdesivir, Decadron, SC Lovenox , remdesivir Zosyn as per infectious disease  -- baricitinib stopped  - management per critical care and ID    E. Coli UTI  - on Aztreonam per ID    Hyponatremia   - improved     MIRELLA withmetabolic acidosis- nephrology has been consulted, urine output has been fair,  - lactate was normal      HTN: Patient currently hypertensive, Levophed has been turned off, patient on labetalol and hydralazine    Hypokalemia, replace potassium as needed, magnesium was checked which was normal    Leukocytosis- on abx as per ID, also on dexamethasone as well,   CT abdomen pelvis- negative for acute process       Diet: Diet NPO  ADULT TUBE FEEDING; Orogastric; Peptide Based; Continuous; 20; No; 50; Q 4 hours    Code Status: Full Code    Electronically signed by Ed Gurrola DO on 11/5/2021 at 12:09 PM

## 2021-11-05 NOTE — PROGRESS NOTES
Pulmonary & Critical Care Medicine ICU Progress Note  Chief complaint : COVID-19 pneumonia, acute respiratory failure    Subjunctive/24 hour events :   Patient seen and examined during multidisciplinary rounds with RN, charge nurse, RT, pharmacy, dietitian, and social service. Patient currently on CPAP trial however encephalopathic, she is staring does not follow command, does not interact, however, she is doing fine with a CPAP trial.  She is currently on Levophed at 2 mcg/min, she is on propofol fentanyl and Precedex for sedation, currently on 50% FiO2 and five of PEEP. No fever overnight, bowels moving, urine output 550 cc, she is +5.8 L. Social History     Tobacco Use    Smoking status: Former Smoker     Quit date: 1974     Years since quittin.3    Smokeless tobacco: Never Used   Substance Use Topics    Alcohol use: No     Comment: quit          Problem Relation Age of Onset    Heart Disease Mother     Cancer Father         lung       Recent Labs     21  22021  05   PHART 7.293* 7.281*   WMD6AAC 41 42   PO2ART 80* 69*       MV Settings:  Vent Mode: (S) CPAP Rate Set: 28 bmp/Vt Ordered: 370 mL/ /FiO2 : 50 %           IV:   dextrose 5% in lactated ringers 100 mL/hr at 21 0425    fentaNYL (SUBLIMAZE) infusion 125 mcg/hr (21 0355)    propofol 10 mcg/kg/min (21 0757)    norepinephrine 2 mcg/min (21 0756)    dexmedetomidine 0.6 mcg/kg/hr (21 0551)       Vitals:  BP (!) 127/39   Pulse 53   Temp 97 °F (36.1 °C) (Bladder)   Resp 15   Ht 5' 5\" (1.651 m)   Wt 283 lb 15.2 oz (128.8 kg)   SpO2 96%   BMI 47.25 kg/m²    Tmax:        Intake/Output Summary (Last 24 hours) at 2021 0846  Last data filed at 2021 0600  Gross per 24 hour   Intake 4745 ml   Output 550 ml   Net 4195 ml       EXAM:  General: On vent, sedated, no distress  Head: normocephalic, atraumatic  Eyes:No gross abnormalities.   ENT:  MMM no lesions, ET and OG tube in  Neck:  supple and no masses  Chest : Vent sounds, bilateral rales, no wheezing, nontender, tympanic  Heart[de-identified] Heart sounds are normal.  Regular rate and rhythm without murmur, gallop or rub. ABD:  symmetric, soft, non-tender, no guarding or rebound  Musculoskeletal : no cyanosis, no clubbing and no edema  Neuro: Eyes open, does not follow commands, does not interact  Skin: No rashes or nodules noted. Lymph node:  no cervical nodes  Urology: Yes Grimes   Psychiatric: Calm    Medications:  Scheduled Meds:   clonazePAM  0.5 mg Oral TID    midazolam  2 mg IntraVENous Once    fluconazole  100 mg IntraVENous Q24H    lactulose  20 g Oral 4 times per day    potassium chloride  40 mEq Oral Once    aztreonam  1,000 mg IntraVENous Q12H    sennosides-docusate sodium  1 tablet Oral BID    miconazole   Topical BID    potassium chloride  60 mEq Oral Once    chlorhexidine  15 mL Mouth/Throat BID    pantoprazole  40 mg IntraVENous Daily    And    sodium chloride (PF)  10 mL IntraVENous Daily    [Held by provider] carvedilol  12.5 mg Oral BID WC    [Held by provider] valsartan  320 mg Oral Daily    pravastatin  40 mg Oral Nightly    citalopram  20 mg Oral Daily    aspirin  81 mg Oral Daily    fluticasone  2 spray Each Nostril Daily    remdesivir IVPB  100 mg IntraVENous Q24H    enoxaparin  30 mg SubCUTAneous BID    dexamethasone  6 mg IntraVENous Q24H       PRN Meds:  labetalol, morphine, hydrALAZINE **OR** hydrALAZINE, sennosides-docusate sodium    Results: reviewed by me   CBC:   Recent Labs     11/03/21  0600 11/03/21  0956 11/04/21  0526 11/04/21  1009 11/04/21  2201 11/05/21  0519 11/05/21  0600   WBC 17.0*  --  16.4*  --   --   --  18.7*   HGB 12.8   < > 11.6*   < > 11.4* 11.1* 11.0*   HCT 37.8  --  34.7*  --   --   --  34.1*   MCV 97.3  --  97.2  --   --   --  98.7     --  256  --   --   --  240    < > = values in this interval not displayed.      BMP:   Recent Labs     11/04/21  0513 11/04/21  1009 11/04/21  1510 11/04/21  1530 11/04/21  2201 11/05/21  0519 11/05/21  0600     --   --  129*  --   --  137   K 3.3*  --   --  3.6  --   --  3.6     --   --  97  --   --  102   CO2 19*  --   --  20  --   --  18*   PHOS  --   --   --  5.1*  --   --   --    BUN 74*  --   --  75*  --   --  79*   CREATININE 2.16*   < >   < > 2.43* 2.3* 2.3* 2.49*    < > = values in this interval not displayed. LIVER PROFILE:   Recent Labs     11/03/21  0600 11/04/21  0527 11/05/21  0600   AST 34 38* 45*   ALT 46* 43* 49*   BILITOT 0.6 0.5 0.4   ALKPHOS 62 55 56     PT/INR: No results for input(s): PROTIME, INR in the last 72 hours. APTT: No results for input(s): APTT in the last 72 hours. UA:No results for input(s): NITRITE, COLORU, PHUR, LABCAST, WBCUA, RBCUA, MUCUS, TRICHOMONAS, YEAST, BACTERIA, CLARITYU, SPECGRAV, LEUKOCYTESUR, UROBILINOGEN, BILIRUBINUR, BLOODU, GLUCOSEU, AMORPHOUS in the last 72 hours. Invalid input(s): Jasiel Rosas    Cultures:  Negative so far  Films:  CXR reviewed by me and it showed right lower lobe consolidation, slightly congested, rotated. Assessment:   This is a critically ill patient at risk of deterioration / death , needing close ICU monitoring and intervention due to below noted problems     · Acute hypoxic respiratory failure  · Severe COVID-19 pneumonia  · Asthma, no signs of exacerbation  · Septic shock, back on Levophed yesterday  · Obesity  · Hypertension, currently hypotensive on Levophed  · Acute kidney injury  · Hypercoagulable state    Recommendation  · Vent support lung protective strategy  · head of the bed 30°  · Sedation holiday today  · Breathing trials daily,  · Decrease Klonopin to twice daily and take off sedation slowly  · Sedation with combination propofol and fentanyl target R ASS of 0 to -1, minimize as much as possible  · Watch for ICU delirium: TV on, natural light, avoid benzos, pain control, early mobility, and family engagement  · PUD prophylaxis  · DVT prophylaxis, change Lovenox to heparin in light of renal failure  · Dexamethasone 6 mg daily for 10 days  · Remdesivir extended course per ID for 10 days, consider discontinue due to worsening renal function, will defer to ID  · Monitor renal function and urine output  · Monitor and replace electrolytes  · Target blood sugar 140180  · Levophed to maintain arterial pressure 65-70,  · Antibiotics per ID  · CT head if unable to wake up and follow commands today  · Appreciate nephrology, fluid management per nephrology, might need dialysis  · Might need tracheostomy if encephalopathy does not improve        Due to the immediate potential for life-threatening deterioration due to acute respiratory failure I spent 35  minutes providing critical care.  This time is excluding time spent performing procedures.           Electronically signed by Radha Mclaughlin MD,  [facility] ,on 11/5/2021 at 8:46 AM

## 2021-11-05 NOTE — CONSULTS
Palliative Care Progress Note  Patient: Letha Souza  Gender: female  YOB: 1945  Unit/Bed: IC06/IC06-01  Code Status: Full Code  Inpatient Treatment Team: Treatment Team: Attending Provider: Sravan Odell DO; Consulting Physician: Sindi Charles DO; Utilization Reviewer: Kassie De Los Santos RN; Consulting Physician: Gloria Mann MD; [address] [facility] Nurse: Kassie De Los Santos RN; Consulting Physician: Aquiles Mishra DO; Registered Nurse: Kathie Campos RN; Registered Nurse: Minoo Jordan, RN; Utilization Reviewer: Jacqueline Dwyer RN  Admit Date:  10/27/2021    Chief Complaint: Goals of Care    History of Presenting Illness:      Letha Souza is a 68 y.o. female on hospital day 5 with a history of  asthma, not chronically on supplemental O2, CKD, GERD, hypertension, hyperlipidemia, gout, JARON with morbid obesity, and chronic pain. Patient is not vaccinated. Presented to ED for progressively worsening shortness of breath and tested positive for COVID-19. In the emergency department PF ratio was 96 she required 100% FiO2 via non-rebreather. T-max 99.1 respirations 33 pulse 91 blood pressure 184/65. Base metabolic panel shows a sodium 127 with a creatinine of 1.25. Mild bump in AST at 61 ALT 30. ABG done showed a pH of 7.36 PCO2 35 PO2 59 and bicarb of 20 this is on 100% FiO2 with O2 sat of 90%. Chest CT shows diffuse bilateral infiltrates throughout the lung fields not present on CT chest done in August. Code status was discussed in ED as she was adamant against intubation or CPR. However she changed her mind and requested FULL CODE. Currently She is on BiPAP, she feels short of breath, denies pain she is currently on 85% FiO2, saturation 88 to 90%, no fever. On IV Remdesivir, Decadron, Treated for E. Coli UTI. On precedex drip for anxiety and agitation. Morphine for pain and dyspnea. Not eating well due to anorexia and being on BiPAP, but is ordered a normal diet.      11/2/21 Naomi Angel was intubated yesterday, currently intubated and sedated,  on 60% FiO2, and 10 of PEEP, saturation 97%, no fever, she is on Levophed at 3 mcg/min, currently sedated. No distress noted. 11/3/21     currently on 50% FiO2 and 8 of PEEP, saturation 97%, she is on fentanyl and propofol, weaning down, Visited during a sedation holiday. eyes open alert, slightly anxious, but able to be comforted. 11/4/21     Sedated, currently supine, she open her eyes to verbal stimuli,but not following requests, on 50% FiO2, and 8 of PEEP, saturation 96%, no fever overnight, she is off pressors. Very anxious when on breathing trials, Klonopin started for comfort. 11/5/21     currently on CPAP trial, but appears encephalopathic, she is staring does not follow requests, or interact. But she is doing well per pulmonology with  CPAP trial. Currently on Levophed at 2 mcg/min,  propofol fentanyl and Precedex for sedation, currently on 50% FiO2 and five of PEEP. No fever. Moves all four limbs purposefully. GFR 18.8, WBC 18.7,  CA 7.9, Alb 2.6, ALT 49, AST 45, procalcitonin 0.51, POCT arterial suggest metabolic acidosis         Review of Systems:       Review of Systems   Unable to perform ROS: Intubated   Constitutional: Negative for activity change, appetite change, chills, diaphoresis, fatigue, fever and unexpected weight change. HENT: Negative for drooling, hearing loss, mouth sores, sore throat, trouble swallowing and voice change. Eyes: Negative for discharge, itching and visual disturbance. Respiratory: Negative for apnea, cough, choking, chest tightness, shortness of breath, wheezing and stridor. Cardiovascular: Negative for chest pain, palpitations and leg swelling. Gastrointestinal: Negative for abdominal distention, abdominal pain, anal bleeding, blood in stool, constipation, diarrhea, nausea, rectal pain and vomiting.    Genitourinary: Negative for difficulty urinating, dysuria, enuresis, frequency and hematuria. Musculoskeletal: Negative for arthralgias, back pain, gait problem, joint swelling and myalgias. Skin: Negative for color change, pallor, rash and wound. Allergic/Immunologic: Negative for food allergies and immunocompromised state. Neurological: Negative for dizziness, tremors, seizures, syncope, facial asymmetry, speech difficulty, weakness, light-headedness, numbness and headaches. Hematological: Negative for adenopathy. Does not bruise/bleed easily. Psychiatric/Behavioral: Negative for agitation, behavioral problems, confusion, decreased concentration, dysphoric mood, hallucinations, self-injury, sleep disturbance and suicidal ideas. The patient is not nervous/anxious and is not hyperactive. Physical Examination:       BP (!) 127/39   Pulse 79   Temp 98.1 °F (36.7 °C) (Oral)   Resp 19   Ht 5' 5\" (1.651 m)   Wt 283 lb 15.2 oz (128.8 kg)   SpO2 92%   BMI 47.25 kg/m²    Physical Exam  Constitutional:       General: She is not in acute distress. Appearance: She is ill-appearing. She is not diaphoretic. Interventions: She is sedated and intubated. HENT:      Head: Normocephalic and atraumatic. Right Ear: External ear normal.      Left Ear: External ear normal.      Nose: Nose normal.      Mouth/Throat:      Pharynx: No oropharyngeal exudate. Eyes:      General: No scleral icterus. Right eye: No discharge. Left eye: No discharge. Conjunctiva/sclera: Conjunctivae normal.      Pupils: Pupils are equal, round, and reactive to light. Neck:      Thyroid: No thyromegaly. Vascular: No JVD. Trachea: No tracheal deviation. Cardiovascular:      Rate and Rhythm: Normal rate and regular rhythm. Heart sounds: Normal heart sounds. Pulmonary:      Effort: Pulmonary effort is normal. Tachypnea present. No respiratory distress. She is intubated. Breath sounds: No stridor. Decreased breath sounds and rhonchi present.  No wheezing or rales.   Chest:      Chest wall: No tenderness. Abdominal:      General: Bowel sounds are normal. There is no distension. Palpations: Abdomen is soft. There is no mass. Tenderness: There is no abdominal tenderness. There is no guarding or rebound. Musculoskeletal:         General: No tenderness or deformity. Normal range of motion. Cervical back: Normal range of motion and neck supple. Lymphadenopathy:      Cervical: No cervical adenopathy. Skin:     General: Skin is warm and dry. Findings: No erythema or rash. Neurological:      Mental Status: She is oriented to person, place, and time. Psychiatric:         Behavior: Behavior normal.         Thought Content: Thought content normal.         Allergies: Allergies   Allergen Reactions    Augmentin [Amoxicillin-Pot Clavulanate] Other (See Comments)     Lightheadedness and dizziness    Aspartame      welts    Atorvastatin Calcium     Carbapenems     Cephalexin Other (See Comments)     Mood, gi upset     Clonidine Derivatives     Diclofenac Sodium     Doxycycline Hyclate     Enalapril     Fexofenadine Hydrochloride     Flagyl [Metronidazole]     Imipenem     Levofloxacin Other (See Comments)     Nightmares     Lisinopril     Macrolides And Ketolides     Metronidazole Hcl     Nsaids     Oxaprozin     Oxycodone-Acetaminophen      Other reaction(s):  Other: See Comments  Pt states she gets gout    Paroxetine     Sulfa Antibiotics     Tape Merwyn Kristen Tape]     Tramadol Other (See Comments)       Medications:      Current Facility-Administered Medications   Medication Dose Route Frequency Provider Last Rate Last Admin    heparin (porcine) injection 5,000 Units  5,000 Units SubCUTAneous 3 times per day Tammy Morgan MD        clonazePAM (KLONOPIN) tablet 0.25 mg  0.25 mg Oral BID Yessica Lung, APRN - CNP        midazolam PF (VERSED) injection 2 mg  2 mg IntraVENous Once [place] Lung, APRN - CNP        fluconazole (DIFLUCAN) in 0.9 % sodium chloride IVPB 100 mg  100 mg IntraVENous Q24H Sharri Baker DO   Stopped at 11/05/21 0100    lactulose (CHRONULAC) 10 GM/15ML solution 20 g  20 g Oral 4 times per day MICHOACANO Madison CNP   20 g at 11/05/21 0026    labetalol (NORMODYNE;TRANDATE) injection 10 mg  10 mg IntraVENous Q4H PRN MICHOACANO Madison CNP   10 mg at 11/04/21 0950    dextrose 5 % in lactated ringers infusion   IntraVENous Continuous Harrison Meraz,  mL/hr at 11/05/21 0425 New Bag at 11/05/21 0425    potassium chloride (KLOR-CON M) extended release tablet 40 mEq  40 mEq Oral Once Jean Claude Walters MD        aztreonam (AZACTAM) 1000 mg IVPB minibag  1,000 mg IntraVENous Q12H Lee Temple MD   Stopped at 11/05/21 0600    sennosides-docusate sodium (SENOKOT-S) 8.6-50 MG tablet 1 tablet  1 tablet Oral BID Qamar Eldridge MD   1 tablet at 11/04/21 0749    miconazole (MICOTIN) 2 % powder   Topical BID Jean Claude Walters MD   Given at 11/05/21 0951    potassium chloride (KLOR-CON M) extended release tablet 60 mEq  60 mEq Oral Once Jean Claude Walters MD        fentaNYL (SUBLIMAZE) 1,000 mcg in sodium chloride 0.9 % 100 mL infusion  12.5-200 mcg/hr IntraVENous Continuous MICHOACANO Madison CNP 12.5 mL/hr at 11/05/21 0355 125 mcg/hr at 11/05/21 0355    propofol injection  5-50 mcg/kg/min IntraVENous Titrated MICHOACANO Madison CNP 8 mL/hr at 11/05/21 0757 10 mcg/kg/min at 11/05/21 0757    chlorhexidine (PERIDEX) 0.12 % solution 15 mL  15 mL Mouth/Throat BID MICHOACANO Madison CNP   15 mL at 11/05/21 0950    pantoprazole (PROTONIX) injection 40 mg  40 mg IntraVENous Daily MICHOACANO Madison CNP   40 mg at 11/05/21 0950    And    sodium chloride (PF) 0.9 % injection 10 mL  10 mL IntraVENous Daily MICHOACANO Madison CNP   10 mL at 11/04/21 2121    norepinephrine (LEVOPHED) 16 mg in dextrose 5% 250 mL infusion  2-100 mcg/min IntraVENous Continuous MICHOACANO Madison CNP 1.9 mL/hr at 11/05/21 0756 2 mcg/min at 11/05/21 0756    [Held by provider] carvedilol (COREG) tablet 12.5 mg  12.5 mg Oral BID WC Lara Bee, DO   12.5 mg at 11/01/21 1010    morphine (PF) injection 2 mg  2 mg IntraVENous Q1H PRN Sindi Camp, DO   2 mg at 11/01/21 0407    hydrALAZINE (APRESOLINE) injection 10 mg  10 mg IntraVENous Q4H PRN Sindi Camp, DO   10 mg at 11/04/21 1057    Or    hydrALAZINE (APRESOLINE) injection 20 mg  20 mg IntraVENous Q4H PRN Sindi Camp, DO   20 mg at 11/04/21 0130    [Held by provider] valsartan (DIOVAN) tablet 320 mg  320 mg Oral Daily Llana Bo DAVALOS Sedar, DO   320 mg at 11/01/21 1008    pravastatin (PRAVACHOL) tablet 40 mg  40 mg Oral Nightly Brandon DAVALOS Sedar, DO   40 mg at 11/04/21 2120    citalopram (CELEXA) tablet 20 mg  20 mg Oral Daily Brandon DAVALOS Sedar, DO   20 mg at 11/05/21 1596    aspirin chewable tablet 81 mg  81 mg Oral Daily Brandon DAVALOS Sedar, DO   81 mg at 11/05/21 0949    fluticasone (FLONASE) 50 MCG/ACT nasal spray 2 spray  2 spray Each Nostril Daily [facility]   2 spray at 10/28/21 0903    remdesivir 100 mg in sodium chloride 0.9 % 250 mL IVPB  100 mg IntraVENous Q24H India Hahn  mL/hr at 11/05/21 1130 100 mg at 11/05/21 1130    sennosides-docusate sodium (SENOKOT-S) 8.6-50 MG tablet 2 tablet  2 tablet Oral Daily PRN Sindi Camp, DO        dexamethasone (DECADRON) injection 6 mg  6 mg IntraVENous Q24H [facility]   6 mg at 11/05/21 0950    dexmedetomidine (PRECEDEX) 400 mcg in sodium chloride 0.9 % 100 mL infusion  0.2-1.4 mcg/kg/hr IntraVENous Continuous [facility] Jumbo Sedar, DO 19.1 mL/hr at 11/05/21 0551 0.6 mcg/kg/hr at 11/05/21 0551       History:       PMHx:  Past Medical History:   Diagnosis Date    Allergic rhinitis 9/18/2013    Anxiety 9/18/2013    Asthma     Carpal tunnel syndrome 5/19/2015    Cellulitis of toe, right 6/5/2012    Cholelithiasis, common bile duct 1/10/2012    CKD (chronic kidney disease) stage 3, GFR 30-59 ml/min ([facility]) 2013    Depression     Dyslipidemia 2011    GERD (gastroesophageal reflux disease)     Gout 10/24/2011    HTN (hypertension) 10/24/2011    Hypertension     Irritable bowel syndrome 2011    Microscopic hematuria 2012    Midsternal chest pain     Migraine headache 2012    Multiple-type hyperlipidemia 2015    Muscle spasm 10/24/2011    Obesity, Class III, BMI 40-49.9 (morbid obesity) ([address] 2011    JARON (obstructive sleep apnea) 2013    Osteoarthritis     Other activity(E029.9)     sensative to vicryl sutures    Palpitations     Polyarthralgia 10/24/2011    Screening mammogram     Skin cancer 10/10/2016    Splenic mass 2012       PSHx:  Past Surgical History:   Procedure Laterality Date    BUNIONECTOMY      removal    CARDIOVASCULAR STRESS TEST       normal    CHOLECYSTECTOMY      HYSTERECTOMY      SINUS SURGERY      TONSILLECTOMY         Social Hx:  Social History     Socioeconomic History    Marital status:      Spouse name: None    Number of children: None    Years of education: None    Highest education level: None   Occupational History    None   Tobacco Use    Smoking status: Former Smoker     Quit date: 1974     Years since quittin.2    Smokeless tobacco: Never Used   Vaping Use    Vaping Use: Never used   Substance and Sexual Activity    Alcohol use: No     Comment: quit     Drug use: No    Sexual activity: None   Other Topics Concern    None   Social History Narrative    None     Social Determinants of Health     Financial Resource Strain:     Difficulty of Paying Living Expenses:    Food Insecurity:     Worried About Running Out of Food in the Last Year:     Ran Out of Food in the Last Year:    Transportation Needs:     Lack of Transportation (Medical):      Lack of Transportation (Non-Medical):    Physical Activity:     Days of Exercise per Week:     Minutes of Exercise per Session: Stress:     Feeling of Stress :    Social Connections:     Frequency of Communication with Friends and Family:     Frequency of Social Gatherings with Friends and Family:     Attends Faith Services:     Active Member of Clubs or Organizations:     Attends Club or Organization Meetings:     Marital Status:    Intimate Partner Violence:     Fear of Current or Ex-Partner:     Emotionally Abused:     Physically Abused:     Sexually Abused:        Family Hx:  Family History   Problem Relation Age of Onset    Heart Disease Mother     Cancer Father         lung       LABS: Reviewed     CBC:  Lab Results   Component Value Date    WBC 18.7 11/05/2021    RBC 3.45 11/05/2021    RBC 4.15 05/18/2012    HGB 10.9 11/05/2021    HCT 34.1 11/05/2021    MCV 98.7 11/05/2021    MCH 31.9 11/05/2021    MCHC 32.3 11/05/2021    RDW 14.1 11/05/2021     11/05/2021    MPV 9.7 05/20/2015     CBC with Differential:   Lab Results   Component Value Date    WBC 18.7 11/05/2021    RBC 3.45 11/05/2021    RBC 4.15 05/18/2012    HGB 10.9 11/05/2021    HCT 34.1 11/05/2021     11/05/2021    MCV 98.7 11/05/2021    MCH 31.9 11/05/2021    MCHC 32.3 11/05/2021    RDW 14.1 11/05/2021    NRBC 1 11/02/2021    LYMPHOPCT 3.8 11/05/2021    MONOPCT 7.1 11/05/2021    MYELOPCT 2 11/04/2021    EOSPCT 3.8 12/28/2011    BASOPCT 0.1 11/05/2021    MONOSABS 1.3 11/05/2021    LYMPHSABS 0.7 11/05/2021    EOSABS 0.1 11/05/2021    BASOSABS 0.0 11/05/2021     CMP:    Lab Results   Component Value Date     11/05/2021    K 3.6 11/05/2021     11/05/2021    CO2 18 11/05/2021    BUN 79 11/05/2021    CREATININE 2.3 11/05/2021    CREATININE 2.49 11/05/2021    GFRAA 25 11/05/2021    LABGLOM 21 11/05/2021    GLUCOSE 127 11/05/2021    GLUCOSE 108 05/18/2012    PROT 4.8 11/05/2021    LABALBU 2.6 11/05/2021    LABALBU 4.2 05/18/2012    CALCIUM 7.9 11/05/2021    BILITOT 0.4 11/05/2021    ALKPHOS 56 11/05/2021    AST 45 11/05/2021    ALT 49 11/05/2021     BMP:    Lab Results   Component Value Date     11/05/2021    K 3.6 11/05/2021     11/05/2021    CO2 18 11/05/2021    BUN 79 11/05/2021    LABALBU 2.6 11/05/2021    LABALBU 4.2 05/18/2012    CREATININE 2.3 11/05/2021    CREATININE 2.49 11/05/2021    CALCIUM 7.9 11/05/2021    GFRAA 25 11/05/2021    LABGLOM 21 11/05/2021    GLUCOSE 127 11/05/2021    GLUCOSE 108 05/18/2012     TSH:   Lab Results   Component Value Date    TSH 1.950 03/26/2014     Vitamin B12 and Folate: No components found for: FOLIC,  B42  Urinalysis:   Lab Results   Component Value Date    NITRU Negative 10/27/2021    WBCUA 10-20 10/27/2021    BACTERIA MODERATE 10/27/2021    RBCUA 5-10 10/27/2021    BLOODU Moderate 10/27/2021    SPECGRAV >=1.030 10/27/2021    GLUCOSEU Negative 10/27/2021    GLUCOSEU NEG 12/17/2011           FUNCTIONAL ADL´S:     Independent: [  ] Eating, [   ] Dressing, [   ] Transferring, [   ] Raynald Mauri, [   ] Bathing, [   ] Continence  Dependent   : [x  ] Eating, [x   ] Dressing, [ x  ] Transferring, [  x ] Toileting, [  x ] Bathing, [  x ] Continence  W. assistant : [  ] Eating, [   ] Dressing, [   ] Transferring, [   ] Raynald Mauri, [   ] Evelyn Dakota, [   ] Continence    Radiology: Reviewed      No results found. Assessment and plan:      -Advance Care Planning  Discussed goals of care with patient. Explained in extensive detail nuances between full code, DNR CCA and DNR CC. As she became sicker, she feels she is not ready to die, and after much discussion about possible benefits vs risks of intubation with ventilation and what her options would be if she was unable to be weaned off the vent. Qing Ramirezjac has made the decision to be Full Code  MPOA in Place  She is now willing to be intubated, trached, and pegged      -Goals of Care Discussion:  Disease process and goals of treatment were discussed in basic terms.  Her  goal is to optimize available comfort care measures to decrease hospitalizations and maximize function. We discussed the palliative care philosophy in light of those goals. We discussed all care options contingent on treatment response and QOL. Much active listening, presence, and emotional support were given. Due to severe COVID infection not responding to aggressive treatment, acute hypoxia with respiratory failure, inability to maintain her own airway, hypokalemia of 3.0, she is eligible for hospice should she choose to stop aggressive treatment. However she is choosing to escalate her treatment, palliative care will continue to follow. Thank you for allowing me to participate in  Her care. 11/2/21     Attempted to call [facility] to answer questions and offer support, UBALDO    11/5/21     Attempted to call her [facility] to answer questions and offer support, UBALDO.     While Hospitalized she is being treated for:  · Acute hypoxic respiratory failure  · Severe COVID-19 pneumonia  · Asthma, no signs of exacerbation  · Obesity  · Hypertension, blood pressure not controlled   · Acute kidney injury  · Hypercoagulable state    Electronically signed by MICHOACANO Taveras CNP on 11/5/2021 at 12:10 PM

## 2021-11-06 NOTE — PROGRESS NOTES
Pharmacy Note  Vancomycin Consult    Hitesh Bassett is a 68 y.o. female started on Vancomycin for hospital acquired pneumonia and sepsis; consult received from Dr. Amber May to manage therapy. Also receiving the following antibiotics: aztreonam, clindamycin. COVID-19 [U07.1]  Allergies: Augmentin [amoxicillin-pot clavulanate], Aspartame, Atorvastatin calcium, Carbapenems, Cephalexin, Clonidine derivatives, Diclofenac sodium, Doxycycline hyclate, Enalapril, Fexofenadine hydrochloride, Flagyl [metronidazole], Imipenem, Levofloxacin, Lisinopril, Macrolides and ketolides, Metronidazole hcl, Nsaids, Oxaprozin, Oxycodone-acetaminophen, Paroxetine, Sulfa antibiotics, Tape [adhesive tape], and Tramadol    Temp max: 98.1 F    Cultures  Recent Labs     11/01/21  0959 10/27/21  2026 10/27/21  1758 10/27/21  1743   BC No growth after 5 days of incubation.  --    < >  --    BLOODCULT2 No growth after 5 days of incubation.  --    < >  --    ORG  --  Escherichia coli*   < >  --    LABURIN  --  >100,000 CFU/ml  --   --    COVID19  --   --   --  DETECTED*    < > = values in this interval not displayed. Height: 5' 5\" (165.1 cm), Weight: 283 lb 15.2 oz (128.8 kg), Body mass index is 47.25 kg/m². MRSA Nasal swab:N/a, does not meet criteria    Recent Labs     11/06/21  1245   CREATININE 2.50*   Estimated Creatinine Clearance: 26 mL/min (A) (based on SCr of 2.5 mg/dL (H)). .    Goal Trough: </= 15 mg/L    Assessment/Plan:  Will initiate Vancomycin with a one time loading dose of 1500 mg x1, followed by 750 mg IV every 36 hours. Bayesian model predicts:      mg/L*hr   Trough concentration at steady state 13.8 mg/L   Probability AUC is therapeutic 61%   Probability trough >20 mg/L 26%   Probability of nephrotoxicity 9%     Anticipate next level to be with morning labs tomorrow, 11/7. Timing of future trough levels may be adjusted based on culture results, renal function, and clinical response.     Thank you for the consult. Will continue to follow.     Herman Galindo, PharmD  PGY-1 Pharmacy Resident  11/6/2021 3:40 PM

## 2021-11-06 NOTE — PROGRESS NOTES
NIGHT SHIFT NURSING NOTE:    Code Status: DNR-CCA    CKD3  - nephrology following  - minimal urine output -> Lasix 80mg and diuril 500mg -> now anuric  - monitor electrolytes, BMP    Metabolic Acidosis  - LA 04.4 worsening; pH 6.9; Cr 3.45; K 6.1  - Updated Dr María Centeno and Dr Ashok Renae  - monitor CMP, electrolytes    Hypotension  - on Levo 70, Vaso 0.04, Epi 30  - wean Prop  - Albumin, BiCarb push, florinef x1  - maintain MAPs >65; -> running in 40s most of the night    Leukocytosis  - worsening [41]; ID following  - gent and fluconazole added   - ABD CT; KUB -> unable to perform at this time due to patient instability -> reassess in the morning      OVERNIGHT EVENTS  - copious amounts of tan, brown sputum pouring out of mouth.  - TF held, 175mL residual -> continue to hold  - Minimal urine output with worsening renal function  - ABG follow-up with worsening values -> Dr María Centeno updated  - ID at bedside -> KUB or ABD CT unable to transfer due to instability of patient  - Dr Ashok Renae updated at bedside

## 2021-11-06 NOTE — PROGRESS NOTES
Hospitalist Progress Note      PCP: Anthony Amin MD    Date of Admission: 10/27/2021    Chief Complaint:      Patient has worsening this morning a rapid response was called. She was noted to have worsening pH. Discussed plan of care with critical care physician, my hospitalist colleagues respond to the rapid response and with the POA. Patient was switched to DNR CCA. .  Vasu Nielson, patient never really wanted to be resuscitated with CPR although she changed her mind about no intubation and she was intubated but she did not want CPR. CODE STATUS updated to DNR CCA. Discussed with RN in ICU. Dale Charly wanted to speak with the  also, I spoke with Erin Espino who is the  on call who will be contacting her.     Medications:  Reviewed    Infusion Medications    EPINEPHrine infusion 30 mcg/min (11/06/21 1224)    vasopressin (Septic Shock) infusion 0.04 Units/min (11/06/21 1209)    dextrose 5% in lactated ringers 100 mL/hr at 11/06/21 0414    fentaNYL (SUBLIMAZE) infusion 200 mcg/hr (11/06/21 1149)    propofol 20 mcg/kg/min (11/06/21 1235)    norepinephrine 60 mcg/min (11/06/21 1225)    dexmedetomidine 0.04 mcg/kg/hr (11/06/21 1210)     Scheduled Medications    heparin (porcine)  5,000 Units SubCUTAneous 3 times per day    clonazePAM  0.25 mg Oral BID    midazolam  2 mg IntraVENous Once    lactulose  20 g Oral 4 times per day    potassium chloride  40 mEq Oral Once    aztreonam  1,000 mg IntraVENous Q12H    sennosides-docusate sodium  1 tablet Oral BID    miconazole   Topical BID    potassium chloride  60 mEq Oral Once    chlorhexidine  15 mL Mouth/Throat BID    pantoprazole  40 mg IntraVENous Daily    And    sodium chloride (PF)  10 mL IntraVENous Daily    [Held by provider] carvedilol  12.5 mg Oral BID WC    [Held by provider] valsartan  320 mg Oral Daily    pravastatin  40 mg Oral Nightly    citalopram  20 mg Oral Daily    aspirin  81 mg Oral Daily    fluticasone  2 spray Each Nostril Daily    dexamethasone  6 mg IntraVENous Q24H     PRN Meds: labetalol, morphine, hydrALAZINE **OR** hydrALAZINE, sennosides-docusate sodium      Intake/Output Summary (Last 24 hours) at 11/6/2021 1314  Last data filed at 11/6/2021 0600  Gross per 24 hour   Intake 4191.83 ml   Output 750 ml   Net 3441.83 ml       Exam:    BP (!) 221/55   Pulse 107   Temp 98.1 °F (36.7 °C) (Bladder)   Resp 26   Ht 5' 5\" (1.651 m)   Wt 283 lb 15.2 oz (128.8 kg)   SpO2 97%   BMI 47.25 kg/m²     General appearance: Intubated, off sedation. Respiratory:  Equal chest rise   Cardiovascular: Regular rate on tele  Abdomen: non-distended   Musculoskeletal: No edema bilaterally. Labs:   Recent Labs     11/04/21  0526 11/04/21  1009 11/05/21  0600 11/05/21  1059 11/06/21  0401 11/06/21  0414 11/06/21  1049   WBC 16.4*  --  18.7*  --   --  26.8*  --    HGB 11.6*   < > 11.0*   < > 10.9* 11.3* 12.5   HCT 34.7*  --  34.1*  --   --  34.7*  --      --  240  --   --  259  --     < > = values in this interval not displayed. Recent Labs     11/04/21  1530 11/04/21  2201 11/05/21  0600 11/05/21  1059 11/06/21  0325 11/06/21  0401 11/06/21  1049   *  --  137  --  135  --   --    K 3.6  --  3.6  --  4.5  --   --    CL 97  --  102  --  102  --   --    CO2 20  --  18*  --  17*  --   --    BUN 75*  --  79*  --  84*  --   --    CREATININE 2.43*   < > 2.49*   < > 1.95* 2.1* 2.3*   CALCIUM 8.1*  --  7.9*  --  8.3*  --   --    PHOS 5.1*  --   --   --   --   --   --     < > = values in this interval not displayed. Recent Labs     11/04/21  0527 11/05/21  0600 11/06/21  0325   AST 38* 45* 37*   ALT 43* 49* 43*   BILITOT 0.5 0.4 0.4   ALKPHOS 55 56 66     No results for input(s): INR in the last 72 hours. No results for input(s): Wayna Khat in the last 72 hours.     Urinalysis:      Lab Results   Component Value Date    NITRU Negative 10/27/2021    WBCUA 10-20 10/27/2021    BACTERIA MODERATE 10/27/2021    RBCUA 5-10 10/27/2021    BLOODU Moderate 10/27/2021    SPECGRAV >=1.030 10/27/2021    GLUCOSEU Negative 10/27/2021    GLUCOSEU NEG 12/17/2011       Radiology:  XR CHEST PORTABLE   Final Result      ESSENTIALLY STABLE CHEST FROM 11/3/2021. BRONCHOPNEUMONIA SHOULD BE EXCLUDED CLINICALLY. CT ABDOMEN PELVIS WO CONTRAST Additional Contrast? None   Final Result      NO ACUTE INTRA-ABDOMINAL PROCESS IDENTIFIED. OTHER FINDINGS, AS NOTED. XR CHEST PORTABLE   Final Result   BILATERAL LOWER LUNG ATELECTASIS/PNEUMONIA. BILATERAL PLEURAL EFFUSIONS. XR CHEST PORTABLE   Final Result   BILATERAL LOWER LUNG ATELECTASIS/PNEUMONIA. US DUP UPPER EXTREMITY LEFT VENOUS   Final Result   NO SONOGRAPHIC EVIDENCE, DEEP VEIN THROMBOSIS, LEFT UPPER EXTREMITY, WITH ABOVE LIMITATIONS. US DUP UPPER EXTREMITY RIGHT VENOUS   Final Result   NO SONOGRAPHIC EVIDENCE , DEEP VEIN THROMBOSIS, RIGHT UPPER EXTREMITY. US DUP LOWER EXTREMITIES BILATERAL VENOUS   Final Result   NO ULTRASOUND SIGNS OF THROMBUS IN THE BILATERAL LOWER EXTREMITY DEEP VENOUS SYSTEMS FROM THE GROINS TO THE POPLITEAL FOSSA      XR CHEST PORTABLE    (Results Pending)           Assessment/Plan:    77-year-old female with history of HTN, asthma, CKD3, GERD, gout, JARON, morbid obesity who presented with:    Acute hypoxic respiratory failure   Currently intubated. Kyle Dennis Unfortunately worsened today never response was called. Discussed with critical care physician. Minute ventilation increased. Repeat ABG at 3 PM.  - secondary to COVID-19 pneumonia  - -Done with IV Remdesivir, resume Decadron, SC Lovenox ,antibiotics as per infectious disease  -- baricitinib stopped  - management per critical care and ID    E. Coli UTI  - on Aztreonam per ID    Hyponatremia   - improved     MIRELLA withmetabolic acidosis- nephrology has been consulted, urine output has been fair,  - lactate was normal      HTN: Patient currently hypertensive, Levophed has been turned off, patient on labetalol and hydralazine    Hypokalemia, replace potassium as needed, magnesium was checked which was normal    Leukocytosis- on abx as per ID, also on dexamethasone as well,   CT abdomen pelvis- negative for acute process       Diet: Diet NPO  ADULT TUBE FEEDING; Orogastric; Peptide Based; Continuous; 20; Yes; 10; Q 4 hours; 45; 50; Q 4 hours    Code Status: DNR-CCA    Electronically signed by Shayla Brand DO on 11/6/2021 at 1:14 PM

## 2021-11-06 NOTE — PROGRESS NOTES
Pulmonary ICU Progress Note    PRIMARY SERVICE: Pulmonary Disease    INTERVAL HPI: Patient seen and examined at bedside, Interval Notes, orders reviewed. Nursing notes noted    Patient is on vent Support with assist control with rate of 32 tidal volume 370. FiO2 100% and PEEP of 8. Patient is on sedation with diprivan, fentanyl, and Precedex. She is on Levophed, vasopressin and epinephrine. She had fluctuating blood pressure. She had ABG done today shows pH 7.118 PCO2 53 PO2 65 saturation is 84 and bicarb of 17.1. Patient was given 1 amp of bicarb and increased respiratory rate to 32 from 28. Chest x-ray shows there are moderate bibasilar pleural parenchymal opacity no pneumothorax. She has no fever. Patient condition worsened today. FiO2 increased to 100%. Family aware. Discussed with Dr. Estrada Estrella. Patient was made DNR CCA no CPR. Review of Systems   sedated unable to obtain        Intake/Output Summary (Last 24 hours) at 11/6/2021 1425  Last data filed at 11/6/2021 0600  Gross per 24 hour   Intake 4191.83 ml   Output 750 ml   Net 3441.83 ml       Vitals:  BP (!) 221/55   Pulse 112   Temp 98.1 °F (36.7 °C) (Bladder)   Resp (!) 32   Ht 5' 5\" (1.651 m)   Wt 283 lb 15.2 oz (128.8 kg)   SpO2 98%   BMI 47.25 kg/m²   EXAM:  General: Orally intubated, sedated, comfortable in bed, No distress. Head: Atraumatic ,Normocephalic   Eyes: PERRL. No sclera icterus. No conjunctival injection. No discharge   ENT: No nasal  discharge. Pharynx clear. Neck:  Trachea midline. No thyromegaly, no JVD, No cervical adenopathy. Resp : Normal effort,  No accessory muscle use. Bibasilar Rales. No wheezing. Few scattered rhonchi. CV: Tachycardia irregularly irregular. No mumur ,  Rub or gallop  ABD: Non-tender. Non-distended. No masses. No organmegaly. Normal bowel sounds. No hernia.   EXT: No Pitting, No Cyanosis No clubbing  CNS: Sedated      ABG:     Lab Results   Component Value Date    PHART 7.118 11/06/2021 [id number] 53 11/06/2021    PO2ART 65 11/06/2021    CJR7NAS 17.1 11/06/2021    BEART -12 11/06/2021    [id number] 84 11/06/2021     Lab Results   Component Value Date    SHANNON 1.4 11/02/2021     O2 Device: Ventilator  O2 Flow Rate (L/min): 60 L/min    MV Settings:     Vent Mode: AC/VC  Vt Ordered: 370 mL  Rate Set: 32 bmp  FiO2 : 100 %  PEEP/CPAP: 8  Pressure Support: 5 cmH20  Peak Inspiratory Pressure: 22 cmH2O  Plateau Pressure: 25 cmH20  Mean Airway Pressure: 12 cmH20  I:E Ratio: 1:1.80    Diet NPO  ADULT TUBE FEEDING; Orogastric; Peptide Based; Continuous; 20; Yes; 10; Q 4 hours; 45; 50; Q 4 hours    IV:    EPINEPHrine infusion 30 mcg/min (11/06/21 1224)    vasopressin (Septic Shock) infusion 0.04 Units/min (11/06/21 1209)    dextrose 5% in lactated ringers 100 mL/hr at 11/06/21 0414    fentaNYL (SUBLIMAZE) infusion 200 mcg/hr (11/06/21 1149)    propofol 20 mcg/kg/min (11/06/21 1235)    norepinephrine 60 mcg/min (11/06/21 1225)    dexmedetomidine 0.04 mcg/kg/hr (11/06/21 1210)       Medications:  Scheduled Meds:   heparin (porcine)  5,000 Units SubCUTAneous 3 times per day    clonazePAM  0.25 mg Oral BID    midazolam  2 mg IntraVENous Once    lactulose  20 g Oral 4 times per day    potassium chloride  40 mEq Oral Once    aztreonam  1,000 mg IntraVENous Q12H    sennosides-docusate sodium  1 tablet Oral BID    miconazole   Topical BID    potassium chloride  60 mEq Oral Once    chlorhexidine  15 mL Mouth/Throat BID    pantoprazole  40 mg IntraVENous Daily    And    sodium chloride (PF)  10 mL IntraVENous Daily    [Held by provider] carvedilol  12.5 mg Oral BID WC    [Held by provider] valsartan  320 mg Oral Daily    pravastatin  40 mg Oral Nightly    citalopram  20 mg Oral Daily    aspirin  81 mg Oral Daily    fluticasone  2 spray Each Nostril Daily    dexamethasone  6 mg IntraVENous Q24H       PRN Meds:  labetalol, morphine, hydrALAZINE **OR** hydrALAZINE, sennosides-docusate [facility]        Radiology      CT ABDOMEN PELVIS WO CONTRAST Additional Contrast? None    Result Date: 11/3/2021  CT ABDOMEN PELVIS WO CONTRAST: 11/3/2021 6:21 PM CLINICAL HISTORY:  abd pain , worsening leucocytosis . COMPARISON: None available. TECHNIQUE: Spiral images were obtained of the abdomen and pelvis after the uneventful intravenous administration of approximately 100 mL of Isovue-370 contrast. All CT scans at this facility use dose modulation, iterative reconstruction, and/or weight based dosing when appropriate to reduce radiation dose to as low as reasonably achievable. FINDINGS: Small pleural effusions and moderate atelectasis/consolidation of the lower lobes is noted with mild patchy airspace infiltrates of the aerated visualized lung bases, consistent with Covid-19 bronchopneumonia. An orogastric tube is noted in expected position. The urinary bladder is decompressed with Grimes catheter. There is no abnormal bowel or biliary dilatation, ascites, inflammatory changes, significant lymphadenopathy, hernias, or other acute findings identified. Moderate predominantly sigmoid diverticulosis is present without significant diverticulitis. Mild to moderate calcific plaquing of a normal caliber abdominal aorta and branch vessels is present. The gallbladder has been removed. There has been previous hysterectomy. The liver, gallbladder, spleen, pancreas, adrenal glands, kidneys, small bowel loops, and additional images of the pelvis are unremarkable. Moderate degenerative changes are noted of the thoracolumbar spine. NO ACUTE INTRA-ABDOMINAL PROCESS IDENTIFIED. OTHER FINDINGS, AS NOTED.      CTA Chest W WO  (PE study)    Result Date: 10/28/2021  The EXAMINATION: CT scan of the chest with contrast (pulmonary embolism protocol) INDICATION: Short of breath COMPARISON: None TECHNIQUE: Helical CT was performed through the chest utilizing 100 cc of 370 intravenous contrast.  Images were obtained with bolus tracking in order to opacify the pulmonary arteries. Both MIP and 3D volume rendered reconstructions were performed. Exam is limited due to suboptimal opacification as well as streak artifact secondary to patient body habitus. FINDINGS: Limited examination due to suboptimal opacification. There are no findings a gross central or gross proximal port emboli. There is diffuse bilateral patchy multifocal to confluent areas of airspace disease throughout the lung parenchyma. No pleural effusions. No pneumothoraces. No significant periaortic, adenopathy. There is pretracheal, parahilar and subcarinal adenopathy. Within the field-of-view the abdomen is an area of low-attenuation partially visualized within the spleen. It measures approximately 2.3 cm. There is a moderate dorsal kyphosis with multilevel degenerative changes of the thoracic spine. Lonniefrantzvicky Corral LIMITED EXAM DUE TO SUBOPTIMAL OPACIFICATION. NO CENTRAL OR PROXIMAL PORT EMBOLI. 2. THERE IS DIFFUSE BILATERAL PATCHY MULTIFOCAL TO CONFLUENT AREAS OF AIRSPACE DISEASE THROUGHOUT THE LUNG PARENCHYMA. IMAGING FEATURES CAN BE SEEN WITH (COVID-19) PNEUMONIA, THOUGH ARE NONSPECIFIC AND CAN OCCUR WITH A VARIETY OF INFECTIOUS AND NONINFECTIOUS PROCESSES. 3. PARTIALLY VISUALIZED AREA OF LOW-ATTENUATION SPLEEN. NOT FULLY CHARACTERIZED IN THIS STUDY. CONSIDER FOLLOW-UP ULTRASOUND TO FURTHER EVALUATE All CT scans at this facility use dose modulation, iterative reconstruction, and/or weight based dosing when appropriate to reduce radiation dose to as low as reasonably achievable. XR CHEST PORTABLE    Result Date: 11/6/2021  EXAMINATION:  CHEST RADIOGRAPH (PORTABLE SINGLE AP VIEW) Exam Date/Time:  11/6/2021 12:37 PM Clinical History:   respiratory failure Comparison:  Chest radiograph 11/5/2021  FINDINGS: Lines, tubes, and devices: The ET tube is approximately 4.5 cm above the titus. Stable right IJ central venous catheter and enteric tube. Cardiomediastinal silhouette:  Stable.  Lungs and pleura: There are moderate bibasilar pleural parenchymal opacities, unchanged. No pneumothorax. No significant interval change. XR CHEST PORTABLE    Result Date: 11/5/2021  XR CHEST PORTABLE : 11/5/2021 CLINICAL HISTORY:  resp failure . COMPARISON: 11/3/2021. TECHNIQUE: A portable upright AP radiograph of the chest was obtained. FINDINGS: Endotracheal, orogastric tubes and a right internal jugular approach central venous catheter remain in expected positions. The heart remains mild to moderately enlarged, exaggerated by technique. A poor inspiratory volume is present with moderate mid to lower lung field infiltrate/atelectasis, substantially similar to 11/3/2021. There is no increasing pleural effusion, worsening vascular congestion, pneumothorax, or displaced fractures identified. ESSENTIALLY STABLE CHEST FROM 11/3/2021. BRONCHOPNEUMONIA SHOULD BE EXCLUDED CLINICALLY. XR CHEST PORTABLE    Result Date: 11/3/2021  EXAMINATION: XR CHEST PORTABLE CLINICAL HISTORY: RESPIRATORY FAILURE, FOLLOW-UP COMPARISONS: NOVEMBER 1, 2021 FINDINGS: Endotracheal tube 5 cm superior to titus. Nasogastric courses beneath diaphragm. Right jugular vein central line in junction right atrium and superior vena cava. Osseous structures intact. Cardiopericardial silhouette normal. Ill-defined areas  increase opacity bilateral lower lungs. Blunting costophrenic angles bilaterally. BILATERAL LOWER LUNG ATELECTASIS/PNEUMONIA. BILATERAL PLEURAL EFFUSIONS. XR CHEST PORTABLE    Result Date: 11/1/2021  EXAMINATION: XR CHEST PORTABLE CLINICAL HISTORY: COVID 19 POSITIVE. LINE PLACEMENT. COMPARISONS: None available. FINDINGS: Tip of endotracheal tube 3 cm superior to titus. Nasogastric tube courses beneath diaphragm. Tip right jugular vein central line in superior vena cava. Telemetry wires overlie chest. Osseous structures intact.  Cardiopericardial silhouette normal. Ill-defined areas of increased opacity found right and left lower lungs. BILATERAL LOWER LUNG ATELECTASIS/PNEUMONIA. XR CHEST PORTABLE    Result Date: 10/27/2021  Exam: XR CHEST PORTABLE History: Hypoxia Technique: AP portable view of the chest obtained. Comparison: [facility] chest radiograph August 27, 2021 Findings: Atherosclerotic calcification of the thoracic aorta. The cardiomediastinal silhouette is within normal limits. Bilateral patchy airspace opacities most significantly involving the left lung base. No pneumothorax or pleural effusion. No acute osseous normality. Bilateral pneumonia. US [facility] UPPER EXTREMITY RIGHT VENOUS    Result Date: 10/28/2021  EXAMINATION: US [facility] UPPER EXTREMITY RIGHT VENOUS CLINICAL HISTORY: SHORTNESS OF BREATH. No pain or swelling, right upper extremity. FINDINGS: Color flow and augmentation identified right subclavian vein with compression, augmentation, color flow, right internal jugular vein, right axillary vein, right proximal, mid, distal, brachial vein. Compression, augmentation, and color flow right radial and ulnar veins. Compression, augmentation, color flow proximal, mid, and distal right basilic vein, and proximal right cephalic vein. Minute caliber of right cephalic vein precludes distal evaluation. NO SONOGRAPHIC EVIDENCE , DEEP VEIN THROMBOSIS, RIGHT UPPER EXTREMITY. US [facility] UPPER EXTREMITY LEFT VENOUS    Result Date: 10/28/2021  EXAMINATION: US [facility] UPPER EXTREMITY LEFT VENOUS CLINICAL HISTORY: SHORTNESS OF BREATH FINDINGS: Imaging limited secondary to intravenous lines and bandages overlying left forearm. Compression and color flow demonstrated, left subclavian vein, with compression, augmentation, and color flow demonstrated left internal jugular vein, left axillary vein, proximal, mid, and distal right brachial vein, as well as mid left radial and left ulnar veins. Compression, augmentation, and color flow demonstrated left proximal, mid, and distal left basilic vein, and proximal and mid left cephalic vein. NO SONOGRAPHIC EVIDENCE, DEEP VEIN THROMBOSIS, LEFT UPPER EXTREMITY, WITH ABOVE LIMITATIONS. US DUP LOWER EXTREMITIES BILATERAL VENOUS    Result Date: 10/28/2021  EXAMINATION: US DUP LOWER EXTREMITIES BILATERAL VENOUS CLINICAL HISTORY: 76year-old with shortness of breath and  Covid infection COMPARISONS: None available. FINDINGS: Duplex and color Doppler ultrasounds were performed portably of the bilateral lower extremity deep venous systems. Visualized portions of both common femoral veins, femoral veins, popliteal veins demonstrate satisfactory compression, color flow, and augmentation. Deep calf veins not adequately assessed on this portable study. NO ULTRASOUND SIGNS OF THROMBUS IN THE BILATERAL LOWER EXTREMITY DEEP VENOUS SYSTEMS FROM THE GROINS TO THE POPLITEAL FOSSA      Results:  CBC:   Recent Labs     11/05/21  0600 11/05/21  1059 11/06/21  0414 11/06/21  1049 11/06/21  1245   WBC 18.7*  --  26.8*  --  41.3*   HGB 11.0*   < > 11.3* 12.5 11.6*   HCT 34.1*  --  34.7*  --  36.2*   MCV 98.7  --  98.7  --  100.9*     --  259  --  335    < > = values in this interval not displayed.     :   Recent Labs     11/04/21  1530 11/04/21  2201 11/05/21  0600 11/05/21  1059 11/05/21  2148 11/06/21  0325 11/06/21  0401 11/06/21  1049 11/06/21  1245   *  --  137  --   --  135  --   --  132*   K 3.6  --  3.6  --   --  4.5  --   --  5.2*   CL 97  --  102  --   --  102  --   --  99   CO2 20  --  18*  --   --  17*  --   --  16*   PHOS 5.1*  --   --   --   --   --   --   --   --    BUN 75*  --  79*  --   --  84*  --   --  85*   CREATININE 2.43*   < > 2.49*   < >   < > 1.95* 2.1* 2.3* 2.50*    < > = values in this interval not displayed. LIVER PROFILE:   Recent Labs     11/05/21  0600 11/06/21 0325 11/06/21  1245   AST 45* 37* 58*   ALT 49* 43* 41*   BILITOT 0.4 0.4 0.5   ALKPHOS 56 66 123         Assessment:   This is a critically ill patient at risk of deterioration / death , needing close ICU monitoring and intervention due to below noted problems    1. Acute hypoxic respiratory failure on vent  2. Severe COVID-19 pneumonia  3. Asthma, no signs of exacerbation  4. Septic shock  5. Obesity   6. Hypertension, currently hypotensive on Levophed  7. Acute kidney injury  8. Hypercoagulable state  9. Tachycardia      Suggestion:  Continue vent support with lung protective strategy. Head of the bed 30 degree. Sedation as needed. Continue sedation with propofol fentanyl and Precedex. She is also on 3 pressor including Levophed epinephrine and vasopressin. She is on Azactam 1 g every 12 hourly. She is on dexamethasone. She had Grimes  catheter , she is on DVT and GI prophylaxis  Discussed with Dr. Estrada Estrella. Patient has rapid response. She is on 100% FiO2. She has respiratory and metabolic acidosis. Prognosis is guarded to poor she was made DNR CCA no CPR. Critical care time spent reviewing labs/films, examining patient, collaborating with otherphysicians but excluding procedures for life threatening organ failure is 37 minutes.       SIGNATURE: Emmanuelle Lora MD, [facility]

## 2021-11-06 NOTE — PROGRESS NOTES
0700am Report from [employer]. Vented and sedated. VSS MPST  Grimes to CD.    0900am DR Lincoln Groves in and updated on status. HR up to 140's Respers 30's BP 70's Labetolol and hydraziline given. Hr and BP do wn after meds  11am Called Dr Lincoln Groves with ABG results. Orders. 1 amp of BIcarb given rate inc to 32  12:15pm  BP 70\"S Levo increased to 60 meqs. Dr Saeed Candelaria on floor and aware. Orders. Vasopressin up at 0.04 and EPI GTt at 5mg  12:23pm BP staying in low 70\"s Rapid response called. DR Easley and Shabana in room  And orders received. CXr done. Family called per DR North  13:30pm DR Eunice Ayala in and updated on status.  Spoke with POA code status changed yo DNRCCA  1700pm ABG Resutls called to DR Lincoln Groves no new orders at this time

## 2021-11-06 NOTE — PROGRESS NOTES
Renal Progress Note    Assessment and Plan:    1. MIRELLA   2. Hyperkalemia   3.  Metabolic acidosis  Oliguric  Trial of albumin/diuretic  Poor prognosis    Patient Active Problem List:     Polyarthralgia     HTN (hypertension)     Gout     GERD (gastroesophageal reflux disease)     Dyslipidemia     Irritable bowel syndrome     Obesity, Class III, BMI 40-49.9 (morbid obesity) (HCC)     Migraine headache     Microscopic hematuria     Splenic mass     Allergic rhinitis     CKD (chronic kidney disease) stage 3, GFR 30-59 ml/min (HCC)     Asthma     JARON (obstructive sleep apnea)     Anxiety     OA (osteoarthritis) of knee     Midsternal chest pain     Palpitation     Bunion     Carpal tunnel syndrome     Chronic maxillary sinusitis     Melancholia     Ganglion of tendon sheath     Glaucoma suspect     Acquired hallux rigidus     Hypermetropia     Lateral epicondylitis     Multiple-type hyperlipidemia     Nuclear senile cataract     Primary localized osteoarthrosis of ankle and foot     Sensory hearing loss, bilateral     Swelling, mass, or lump in head and neck     Menopausal symptom     Tear film insufficiency     Skin cancer     Pneumonia     Moderate persistent asthma, uncomplicated     [id number]     Acute respiratory failure with hypoxia ([facility])     Hypercoagulable state ([facility] [address]     E. coli UTI      Subjective:   Admit Date: 10/27/2021    Interval History: chart reviewed, clinical documentation/vitals/labs/I&O    Medications:   Scheduled Meds:   clindamycin (CLEOCIN) IV  600 mg IntraVENous Q8H    vancomycin (VANCOCIN) intermittent dosing (placeholder)   Other RX Placeholder    vancomycin  1,500 mg IntraVENous Once    Followed by   Nelda Anne ON 11/8/2021] vancomycin  750 mg IntraVENous Q36H    heparin (porcine)  5,000 Units SubCUTAneous 3 times per day    clonazePAM  0.25 mg Oral BID    midazolam  2 mg IntraVENous Once    lactulose  20 g Oral 4 times per day    potassium chloride  40 mEq Oral Once    aztreonam  1,000 mg IntraVENous Q12H    sennosides-docusate sodium  1 tablet Oral BID    miconazole   Topical BID    potassium chloride  60 mEq Oral Once    chlorhexidine  15 mL Mouth/Throat BID    pantoprazole  40 mg IntraVENous Daily    And    sodium chloride (PF)  10 mL IntraVENous Daily    [Held by provider] carvedilol  12.5 mg Oral BID WC    [Held by provider] valsartan  320 mg Oral Daily    pravastatin  40 mg Oral Nightly    citalopram  20 mg Oral Daily    aspirin  81 mg Oral Daily    fluticasone  2 spray Each Nostril Daily    dexamethasone  6 mg IntraVENous Q24H     Continuous Infusions:   EPINEPHrine infusion 30 mcg/min (11/06/21 1540)    vasopressin (Septic Shock) infusion 0.04 Units/min (11/06/21 1209)    dextrose 5% in lactated ringers 100 mL/hr at 11/06/21 0414    fentaNYL (SUBLIMAZE) infusion 200 mcg/hr (11/06/21 1538)    propofol 20 mcg/kg/min (11/06/21 1502)    norepinephrine 70 mcg/min (11/06/21 1841)    dexmedetomidine 0.9 mcg/kg/hr (11/06/21 1847)       CBC:   Recent Labs     11/06/21  0414 11/06/21  1049 11/06/21  1245 11/06/21  1620   WBC 26.8*  --  41.3*  --    HGB 11.3*   < > 11.6* 13.2     --  335  --     < > = values in this interval not displayed. CMP:    Recent Labs     11/05/21  0600 11/05/21  1059 11/06/21  0325 11/06/21  0401 11/06/21  1049 11/06/21  1245 11/06/21  1620     --  135  --   --  132*  --    K 3.6  --  4.5  --   --  5.2*  --      --  102  --   --  99  --    CO2 18*  --  17*  --   --  16*  --    BUN 79*  --  84*  --   --  85*  --    CREATININE 2.49*   < > 1.95*   < > 2.3* 2.50* 3.0*   GLUCOSE 127*  --  143*  --   --  207*  --    CALCIUM 7.9*  --  8.3*  --   --  8.3*  --    LABGLOM 18.8*   < > 24.9*   < > 21* 18.7* 15*    < > = values in this interval not displayed. Troponin: No results for input(s): TROPONINI in the last 72 hours. BNP: No results for input(s): BNP in the last 72 hours.   INR: No results for input(s): INR in the last 72

## 2021-11-06 NOTE — PROGRESS NOTES
Spiritual Care Services     Summary of Visit:  Phone call to pt's Rudi Albert, to inform her of a significant change of condition. Dr. Esther Ayala to speak with Tessy Hansen shortly. Understanding of change, and leaning toward change back to DNR if recommended. Spiritual Assessment/Intervention/Outcomes:    Encounter Summary  Services provided to[de-identified] Family (phone call to POA)  Referral/Consult From[de-identified] Physician  Support System: Friends/neighbors  Continue Visiting: Yes  Complexity of Encounter: Moderate  Length of Encounter: 15 minutes  Spiritual Assessment Completed: Yes  Advance Care Planning: Yes  Routine  Type: Follow up  Assessment: Coping  Intervention: Mediation  Outcome: Engaged in conversation, Expressed feelings/needs/concerns, Coping     Spiritual/Mandaeism  Type: Spiritual support  Assessment: Approachable  Intervention: Prayer, Sustaining presence/ Ministry of presence  Outcome: Coping, Receptive  Sacraments  Communion: Patient is currently unable     Primary Decision Maker (Healthcare Proxy)  [address] [facility] is[de-identified]  (Document in the chart)                Care Plan:    Ongoing support    [id number] [facility]   Electronically signed by René Kent on 11/6/21 at 12:44 PM EDT     To reach a  for emotional and spiritual support, place an [facility] consult request.   If a  is needed immediately, dial 0 and ask to page the on-call .

## 2021-11-06 NOTE — PROGRESS NOTES
Spiritual Care Services     Summary of Visit:  Marlys Mclain, expressed comfort with the decision for the pt to be DNR-CCA, change made. Martell Monterroso has friends at the [facility] in [place], and will reach out to them today [phone]. Pt has limited financial resources, and no family. Spiritual Assessment/Intervention/Outcomes:    Encounter Summary  Services provided to[de-identified] Family (phone call to POA)  Referral/Consult From[de-identified] Physician  Support System: Friends/neighbors  Continue Visiting: Yes  Complexity of Encounter: Moderate  Length of Encounter: 15 minutes  Spiritual Assessment Completed: Yes  Advance Care Planning: Yes  Routine  Type: Follow up  Assessment: Coping  Intervention: Mediation  Outcome: Engaged in conversation, Expressed feelings/needs/concerns, Coping     Spiritual/Hoahaoism  Type: Spiritual support  Assessment: Approachable  Intervention: Prayer, Sustaining presence/ Ministry of presence  Outcome: Coping, Receptive  Sacraments  Communion: Patient is currently unable     Primary Decision Maker (Healthcare Proxy)  [address] [facility] is[de-identified]  (Document in the chart)                Care Plan:    Ongoing support to care team and pt's friends. Spiritual Care Services   Electronically signed by Yeni Torres on 11/6/21 at 1:07 PM EDT     To reach a  for emotional and spiritual support, place an [facility] consult request.   If a  is needed immediately, dial 0 and ask to page the on-call .

## 2021-11-06 NOTE — PROGRESS NOTES
Infectious Disease Progress Note       11/6/2021    Follow up critical COVID-19 pneumonia with acute respiratory failure and hypoxia, E. coli UTI. Status post Actemra, on IV remdesivir and Azactam. Given diflucan for fungal dermatitis. Procal 0.5  Increasing leukocytosis - WBC 18 => 41 today  Apparently had a rapid response earlier. I was not notified of her decline. Called and spoke to ICU nurse at bedside and ordered Vanco and Clindamycin in addition to her Azactam as well as BC, UA/UC, and sputum cultures. Hypotension. On Pressors. respiratory secretions are tube feed; had > 175 residuals. Feculent appearing. No bowel movement in at least 3 days. Lactic acid 10. Hypotensive and on pressors despite abx. WBC >40    Intubated, very hypotensive  Exam assisted by nurse  Neck supple  Chest equal expansion  Abdomen quiet BS  Extrem no new changes  Expression symmetrical/sedated. fungal dermatitis groin area and under breasts. Lab Results   Component Value Date    WBC 41.3 (H) 11/06/2021    HGB 11.6 (L) 11/06/2021    HCT 36.2 (L) 11/06/2021    .9 (H) 11/06/2021     11/06/2021     Lab Results   Component Value Date     11/06/2021    K 5.2 11/06/2021    K 4.5 11/06/2021    CL 99 11/06/2021    CO2 16 11/06/2021    BUN 85 11/06/2021    CREATININE 2.50 11/06/2021    CREATININE 2.3 11/06/2021    GLUCOSE 207 11/06/2021    GLUCOSE 108 05/18/2012    CALCIUM 8.3 11/06/2021        WBC trends are being monitored. Antibiotic doses are being adjusted per most recent renal labs.      Vitals:    11/06/21 1442   BP:    Pulse: 112   Resp: 26   Temp:    SpO2: 98%           Patient Active Problem List   Diagnosis    Polyarthralgia    HTN (hypertension)    Gout    GERD (gastroesophageal reflux disease)    Dyslipidemia    Irritable bowel syndrome    Obesity, Class III, BMI 40-49.9 (morbid obesity) (HCC)    Migraine headache    Microscopic hematuria    Splenic mass    Allergic rhinitis    CKD (chronic kidney disease) stage 3, GFR 30-59 ml/min (HCC)    Asthma    JARON (obstructive sleep apnea)    Anxiety    OA (osteoarthritis) of knee    Midsternal chest pain    Palpitation    Bunion    Carpal tunnel syndrome    Chronic maxillary sinusitis    Melancholia    Ganglion of tendon sheath    Glaucoma suspect    Acquired hallux rigidus    Hypermetropia    Lateral epicondylitis    Multiple-type hyperlipidemia    Nuclear senile cataract    Primary localized osteoarthrosis of ankle and foot    Sensory hearing loss, bilateral    Swelling, mass, or lump in head and neck    Menopausal symptom    Tear film insufficiency    Skin cancer    Pneumonia    Moderate persistent asthma, uncomplicated    [id number]    Acute respiratory failure with hypoxia (HCC)    [facility]co[facility]able state ([facility] [address]    E. coli UTI           ASSESSMENT:  · Critical COVID-19 pneumonia  · Acute respiratory failure with hypoxia   · Hypercoagulable state  · Acute lower UTI with E. Coli  · Fungal dermatitis  · Multiple allergies to medications      PLAN:  If possible, stat CT abd - possible perforation as the cause for declining clinical picture and feculent appearing secretions? KUB if not able to move the patient. Remdesivir x 10 days ( started 10/28)  Vanco, Clinda added to Azactam for HAP  BC, UA/UC, and respiratory cultures are pending. IV Azactam for E coli UTI  S/p Actemra  Supportive care  Fluconazole x 2 more days  Add gentamicin for few days while in diagnostic process of new issue.      Discussed with nurse      Isolation until November 17 due to critically severe covid infection    Imaging and labs were reviewed per medical records and any ID pertinent labs were also addressed    Sharri Baker DO

## 2021-11-07 NOTE — PROGRESS NOTES
Pulmonary ICU Progress Note    PRIMARY SERVICE: Pulmonary Disease    INTERVAL HPI: Patient seen and examined at bedside, Interval Notes, orders reviewed. Nursing notes noted    Patient is on vent Support with assist control with rate of 32 tidal volume 370. FiO2 80% and PEEP of 8. Patient is maxed out on pressures. She is on Levophed, vasopressin and epinephrine. She had fluctuating blood pressure went down in 80s even with maximum pressure support. She is on propofol, fentanyl and Precedex. .  She has no urine output. Blood culture showing gram-negative bacteremia. Last ABG shows pH of 6.9. Also potassium was 7.9. Given calcium gluconate and 1 amp of bicarb. She is on clindamycin, vancomycin and Diflucan. WBC went up to 58k, she has elevated LFT due to shock liver. As per RN, Anvik Leanne is aware about her critical condition. Discussed with Dr. Luana Jones. Review of Systems   sedated unable to obtain        Intake/Output Summary (Last 24 hours) at 11/7/2021 1039  Last data filed at 11/7/2021 0522  Gross per 24 hour   Intake 7163 ml   Output 120 ml   Net 7043 ml       Vitals:  BP (!) 76/26   Pulse 92   Temp 96.8 °F (36 °C) (Bladder)   Resp (!) 32   Ht 5' 5\" (1.651 m)   Wt 283 lb 15.2 oz (128.8 kg)   SpO2 96%   BMI 47.25 kg/m²   EXAM:  General: Orally intubated, sedated, comfortable in bed, No distress. Head: Atraumatic ,Normocephalic   Eyes: PERRL. No sclera icterus. No conjunctival injection. No discharge   ENT: No nasal  discharge. Pharynx clear. Neck:  Trachea midline. No thyromegaly, no JVD, No cervical adenopathy. Resp : Normal effort,  No accessory muscle use. Bibasilar Rales. No wheezing. Few scattered rhonchi. CV: Tachycardia irregularly irregular. No mumur ,  Rub or gallop  ABD: Non-tender. Non-distended. No masses. No organmegaly. Normal bowel sounds. No hernia.   EXT: No Pitting, No Cyanosis No clubbing  CNS: Sedated      ABG:     Lab Results   Component Value Date    PHART 6.929 BID WC    [Held by provider] valsartan  320 mg Oral Daily    pravastatin  40 mg Oral Nightly    citalopram  20 mg Oral Daily    aspirin  81 mg Oral Daily    fluticasone  2 spray Each Nostril Daily    dexamethasone  6 mg IntraVENous Q24H       PRN Meds:  morphine, LORazepam, labetalol, hydrALAZINE **OR** hydrALAZINE, sennosides-docusate sodium        Radiology      CT ABDOMEN PELVIS WO CONTRAST Additional Contrast? None    Result Date: 11/3/2021  CT ABDOMEN PELVIS WO CONTRAST: 11/3/2021 6:21 PM CLINICAL HISTORY:  abd pain , worsening leucocytosis . COMPARISON: None available. TECHNIQUE: Spiral images were obtained of the abdomen and pelvis after the uneventful intravenous administration of approximately 100 mL of Isovue-370 contrast. All CT scans at this facility use dose modulation, iterative reconstruction, and/or weight based dosing when appropriate to reduce radiation dose to as low as reasonably achievable. FINDINGS: Small pleural effusions and moderate atelectasis/consolidation of the lower lobes is noted with mild patchy airspace infiltrates of the aerated visualized lung bases, consistent with Covid-19 bronchopneumonia. An orogastric tube is noted in expected position. The urinary bladder is decompressed with Grimes catheter. There is no abnormal bowel or biliary dilatation, ascites, inflammatory changes, significant lymphadenopathy, hernias, or other acute findings identified. Moderate predominantly sigmoid diverticulosis is present without significant diverticulitis. Mild to moderate calcific plaquing of a normal caliber abdominal aorta and branch vessels is present. The gallbladder has been removed. There has been previous hysterectomy. The liver, gallbladder, spleen, pancreas, adrenal glands, kidneys, small bowel loops, and additional images of the pelvis are unremarkable. Moderate degenerative changes are noted of the thoracolumbar spine. NO ACUTE INTRA-ABDOMINAL PROCESS IDENTIFIED.  OTHER FINDINGS, AS NOTED. CTA Chest W WO  (PE study)    Result Date: 10/28/2021  The EXAMINATION: CT scan of the chest with contrast (pulmonary embolism protocol) INDICATION: Short of breath COMPARISON: None TECHNIQUE: Helical CT was performed through the chest utilizing 100 cc of 370 intravenous contrast.  Images were obtained with bolus tracking in order to opacify the pulmonary arteries. Both MIP and 3D volume rendered reconstructions were performed. Exam is limited due to suboptimal opacification as well as streak artifact secondary to patient body habitus. FINDINGS: Limited examination due to suboptimal opacification. There are no findings a gross central or gross proximal port emboli. There is diffuse bilateral patchy multifocal to confluent areas of airspace disease throughout the lung parenchyma. No pleural effusions. No pneumothoraces. No significant periaortic, adenopathy. There is pretracheal, parahilar and subcarinal adenopathy. Within the field-of-view the abdomen is an area of low-attenuation partially visualized within the spleen. It measures approximately 2.3 cm. There is a moderate dorsal kyphosis with multilevel degenerative changes of the thoracic spine. Sherryle Moder LIMITED EXAM DUE TO SUBOPTIMAL OPACIFICATION. NO CENTRAL OR PROXIMAL PORT EMBOLI. 2. THERE IS DIFFUSE BILATERAL PATCHY MULTIFOCAL TO CONFLUENT AREAS OF AIRSPACE DISEASE THROUGHOUT THE LUNG PARENCHYMA. IMAGING FEATURES CAN BE SEEN WITH (COVID-19) PNEUMONIA, THOUGH ARE NONSPECIFIC AND CAN OCCUR WITH A VARIETY OF INFECTIOUS AND NONINFECTIOUS PROCESSES. 3. PARTIALLY VISUALIZED AREA OF LOW-ATTENUATION SPLEEN. NOT FULLY CHARACTERIZED IN THIS STUDY. CONSIDER FOLLOW-UP ULTRASOUND TO FURTHER EVALUATE All CT scans at this facility use dose modulation, iterative reconstruction, and/or weight based dosing when appropriate to reduce radiation dose to as low as reasonably achievable.      XR CHEST PORTABLE    Result Date: 11/6/2021  EXAMINATION:  CHEST RADIOGRAPH (PORTABLE SINGLE AP VIEW) Exam Date/Time:  11/6/2021 12:37 PM Clinical History:   respiratory failure Comparison:  Chest radiograph 11/5/2021  FINDINGS: Lines, tubes, and devices: The ET tube is approximately 4.5 cm above the titus. Stable right IJ central venous catheter and enteric tube. Cardiomediastinal silhouette:  Stable. Lungs and pleura: There are moderate bibasilar pleural parenchymal opacities, unchanged. No pneumothorax. No significant interval change. XR CHEST PORTABLE    Result Date: 11/5/2021  XR CHEST PORTABLE : 11/5/2021 CLINICAL HISTORY:  resp failure . COMPARISON: 11/3/2021. TECHNIQUE: A portable upright AP radiograph of the chest was obtained. FINDINGS: Endotracheal, orogastric tubes and a right internal jugular approach central venous catheter remain in expected positions. The heart remains mild to moderately enlarged, exaggerated by technique. A poor inspiratory volume is present with moderate mid to lower lung field infiltrate/atelectasis, substantially similar to 11/3/2021. There is no increasing pleural effusion, worsening vascular congestion, pneumothorax, or displaced fractures identified. ESSENTIALLY STABLE CHEST FROM 11/3/2021. BRONCHOPNEUMONIA SHOULD BE EXCLUDED CLINICALLY. XR CHEST PORTABLE    Result Date: 11/3/2021  EXAMINATION: XR CHEST PORTABLE CLINICAL HISTORY: RESPIRATORY FAILURE, FOLLOW-UP COMPARISONS: NOVEMBER 1, 2021 FINDINGS: Endotracheal tube 5 cm superior to titus. Nasogastric courses beneath diaphragm. Right jugular vein central line in junction right atrium and superior vena cava. Osseous structures intact. Cardiopericardial silhouette normal. Ill-defined areas  increase opacity bilateral lower lungs. Blunting costophrenic angles bilaterally. BILATERAL LOWER LUNG ATELECTASIS/PNEUMONIA. BILATERAL PLEURAL EFFUSIONS. XR CHEST PORTABLE    Result Date: 11/1/2021  EXAMINATION: XR CHEST PORTABLE CLINICAL HISTORY: COVID 19 POSITIVE.  LINE subclavian vein, with compression, augmentation, and color flow demonstrated left internal jugular vein, left axillary vein, proximal, mid, and distal right brachial vein, as well as mid left radial and left ulnar veins. Compression, augmentation, and color flow demonstrated left proximal, mid, and distal left basilic vein, and proximal and mid left cephalic vein. NO SONOGRAPHIC EVIDENCE, DEEP VEIN THROMBOSIS, LEFT UPPER EXTREMITY, WITH ABOVE LIMITATIONS. US DUP LOWER EXTREMITIES BILATERAL VENOUS    Result Date: 10/28/2021  EXAMINATION: US DUP LOWER EXTREMITIES BILATERAL VENOUS CLINICAL HISTORY: 76year-old with shortness of breath and  Covid infection COMPARISONS: None available. FINDINGS: Duplex and color Doppler ultrasounds were performed portably of the bilateral lower extremity deep venous systems. Visualized portions of both common femoral veins, femoral veins, popliteal veins demonstrate satisfactory compression, color flow, and augmentation. Deep calf veins not adequately assessed on this portable study.      NO ULTRASOUND SIGNS OF THROMBUS IN THE BILATERAL LOWER EXTREMITY DEEP VENOUS SYSTEMS FROM THE GROINS TO THE POPLITEAL FOSSA      Results:  CBC:   Recent Labs     11/06/21  0414 11/06/21  1049 11/06/21  1245 11/06/21 1620 11/06/21 2205 11/07/21 0321 11/07/21 0412   WBC 26.8*  --  41.3*  --   --  58.1*  --    HGB 11.3*   < > 11.6*   < > 12.7 11.7* 10.9*   HCT 34.7*  --  36.2*  --   --  38.7  --    MCV 98.7  --  100.9*  --   --  106.8*  --      --  335  --   --  192  --     < > = values in this interval not displayed.     :   Recent Labs     11/04/21  1530 11/04/21  2201 11/06/21  0325 11/06/21  0401 11/06/21  1245 11/06/21  1620 11/06/21 2205 11/07/21 0320 11/07/21 0412   *   < > 135  --  132*  --   --  123*  --    K 3.6   < > 4.5  --  5.2*  --   --  7.9*  --    CL 97   < > 102  --  99  --   --  93*  --    CO2 20   < > 17*  --  16*  --   --  7*  --    PHOS 5.1*  --   --   --   -- --   --   --   --    BUN 75*   < > 84*  --  85*  --   --  86*  --    CREATININE 2.43*   < > 1.95*   < > 2.50*   < > 3.5* 3.57* 4.2*    < > = values in this interval not displayed. LIVER PROFILE:   Recent Labs     11/06/21  0325 11/06/21  1245 11/07/21  0320   AST 37* 58* 1,509*   ALT 43* 41* 1,024*   BILITOT 0.4 0.5 0.8*   ALKPHOS 66 123 233*         Assessment: This is a critically ill patient at risk of deterioration / death , needing close ICU monitoring and intervention due to below noted problems    1. Acute hypoxic respiratory failure on vent  2. Severe COVID-19 pneumonia  3. Septic shock with multiorgan failure  4. Gram-negative bacteremia  5. Acute kidney injury, hyperkalemia  6. Shock liver      Suggestion:  Patient is on a vent support,  sedation with propofol fentanyl and Precedex. She is also on 3 pressor including Levophed, epinephrine and vasopressin all maxed out. She had acute kidney injury with no urine output and hyperkalemia and shock liver. Discussed with Dr. Estrada Schmitt. I called POA and and given update about her overall condition. She is aware about her critical condition and she may decide to make her comfort care. She said she will call back and discussed with RN about CODE STATUS. At this time prognosis is very poor. Critical care time spent reviewing labs/films, examining patient, collaborating with otherphysicians but excluding procedures for life threatening organ failure is 39 minutes.       SIGNATURE: Leilani Jha MD, [facility]

## 2021-11-07 NOTE — PROGRESS NOTES
9356: Critical Lab reported to Unit:  WBC 58.1   -> ID following    ABG results  Lactic Acid 15  K  7.2    0433:  Critical Labs reported to Unit  K  [7.8]  BUN  [86]  CO2  [7]

## 2021-11-08 LAB — HEMATOLOGY PATH CONSULT: NORMAL

## 2021-11-09 LAB
BLOOD CULTURE, ROUTINE: ABNORMAL
CULTURE, BLOOD 2: ABNORMAL
CULTURE, BLOOD 2: ABNORMAL
ORGANISM: ABNORMAL

## 2021-11-10 NOTE — DISCHARGE SUMMARY
Hospital Medicine Discharge Summary    Luh Reed  :  1945  MRN:  [id number]    Admit date:  10/27/2021  Discharge date: 2021  Admitting Physician:  Louis Sosa DO  Primary Care Physician:  Matt Ng MD      Discharge Diagnoses:    Acute hypoxic respiratory failure  COVID-19 pneumonia  E. coli UTI  Hyponatremia  MIRELLA  Metabolic acidosis new hypertension  History of asthma      Hospital Course:     COVID-19 pneumonia  Acute hypoxic respiratory failure    Discussed with Dr Wing Shi, prognosis is very guarded. Death is imminent at this point. Spoke with JJ al this morning. POA wants to compassionately wean off the ventilator.  service requested by [employer], I asked Nguyen RN to call the  service to be at bedside at the time of extubation. Pt changed to DNRcc. Time 22 min. [address] [place] [address]     Exam on discharge:       Patient was seen by the following consultants while admitted to [facility]:   Consults:  [address] TO PULMONOLOGY  IP CONSULT TO INFECTIOUS DISEASES  IP CONSULT TO PALLIATIVE CARE  IP CONSULT TO DIETITIAN  IP CONSULT TO NEPHROLOGY  IP CONSULT TO PALLIATIVE CARE  PHARMACY TO DOSE VANCOMYCIN  IP CONSULT TO PHARMACY    Significant Diagnostic Studies:    Refer to chart     Please refer to chart if no studies are shown here    CTA Chest W WO  (PE study)    Result Date: 10/28/2021  The EXAMINATION: CT scan of the chest with contrast (pulmonary embolism protocol) INDICATION: Short of breath COMPARISON: None TECHNIQUE: Helical CT was performed through the chest utilizing 100 cc of 370 intravenous contrast.  Images were obtained with bolus tracking in order to opacify the pulmonary arteries. Both MIP and 3D volume rendered reconstructions were performed. Exam is limited due to suboptimal opacification as well as streak artifact secondary to patient body habitus.  FINDINGS: Limited examination due to suboptimal opacification. There are no findings a gross central or gross proximal port emboli. There is diffuse bilateral patchy multifocal to confluent areas of airspace disease throughout the lung parenchyma. No pleural effusions. No pneumothoraces. No significant periaortic, adenopathy. There is pretracheal, parahilar and subcarinal adenopathy. Within the field-of-view the abdomen is an area of low-attenuation partially visualized within the spleen. It measures approximately 2.3 cm. There is a moderate dorsal kyphosis with multilevel degenerative changes of the thoracic spine. Lawernce Roughen LIMITED EXAM DUE TO SUBOPTIMAL OPACIFICATION. NO CENTRAL OR PROXIMAL PORT EMBOLI. 2. THERE IS DIFFUSE BILATERAL PATCHY MULTIFOCAL TO CONFLUENT AREAS OF AIRSPACE DISEASE THROUGHOUT THE LUNG PARENCHYMA. IMAGING FEATURES CAN BE SEEN WITH (COVID-19) PNEUMONIA, THOUGH ARE NONSPECIFIC AND CAN OCCUR WITH A VARIETY OF INFECTIOUS AND NONINFECTIOUS PROCESSES. 3. PARTIALLY VISUALIZED AREA OF LOW-ATTENUATION SPLEEN. NOT FULLY CHARACTERIZED IN THIS STUDY. CONSIDER FOLLOW-UP ULTRASOUND TO FURTHER EVALUATE All CT scans at this facility use dose modulation, iterative reconstruction, and/or weight based dosing when appropriate to reduce radiation dose to as low as reasonably achievable. XR CHEST PORTABLE    Result Date: 10/27/2021  Exam: XR CHEST PORTABLE History: Hypoxia Technique: AP portable view of the chest obtained. Comparison: Portal chest radiograph August 27, 2021 Findings: Atherosclerotic calcification of the thoracic aorta. The cardiomediastinal silhouette is within normal limits. Bilateral patchy airspace opacities most significantly involving the left lung base. No pneumothorax or pleural effusion. No acute osseous normality. Bilateral pneumonia. US DUP UPPER EXTREMITY RIGHT VENOUS    Result Date: 10/28/2021  EXAMINATION: US DUP UPPER EXTREMITY RIGHT VENOUS CLINICAL HISTORY: SHORTNESS OF BREATH.  No pain or swelling, right upper extremity. FINDINGS: Color flow and augmentation identified right subclavian vein with compression, augmentation, color flow, right internal jugular vein, right axillary vein, right proximal, mid, distal, brachial vein. Compression, augmentation, and color flow right radial and ulnar veins. Compression, augmentation, color flow proximal, mid, and distal right basilic vein, and proximal right cephalic vein. Minute caliber of right cephalic vein precludes distal evaluation. NO SONOGRAPHIC EVIDENCE , DEEP VEIN THROMBOSIS, RIGHT UPPER EXTREMITY. [facility] UPPER EXTREMITY LEFT VENOUS    Result Date: 10/28/2021  EXAMINATION: [facility] UPPER EXTREMITY LEFT VENOUS CLINICAL HISTORY: SHORTNESS OF BREATH FINDINGS: Imaging limited secondary to intravenous lines and bandages overlying left forearm. Compression and color flow demonstrated, left subclavian vein, with compression, augmentation, and color flow demonstrated left internal jugular vein, left axillary vein, proximal, mid, and distal right brachial vein, as well as mid left radial and left ulnar veins. Compression, augmentation, and color flow demonstrated left proximal, mid, and distal left basilic vein, and proximal and mid left cephalic vein. NO SONOGRAPHIC EVIDENCE, DEEP VEIN THROMBOSIS, LEFT UPPER EXTREMITY, WITH ABOVE LIMITATIONS. [facility] LOWER EXTREMITIES BILATERAL VENOUS    Result Date: 10/28/2021  EXAMINATION: US [facility] LOWER EXTREMITIES BILATERAL VENOUS CLINICAL HISTORY: 76year-old with shortness of breath and  Covid infection COMPARISONS: None available. FINDINGS: Duplex and color Doppler ultrasounds were performed portably of the bilateral lower extremity deep venous systems. Visualized portions of both common femoral veins, femoral veins, popliteal veins demonstrate satisfactory compression, color flow, and augmentation. Deep calf veins not adequately assessed on this portable study.      NO ULTRASOUND SIGNS OF THROMBUS IN THE BILATERAL LOWER EXTREMITY DEEP VENOUS SYSTEMS FROM THE GROINS TO THE POPLITEAL FOSSA      Discharge Medications:   Not applicable    Disposition:   Not applicable  Condition at discharge: Not applicable  Activity: Not applicable    Total time taken for discharging this patient: 40 minutes.      Mabel Mcmanus DO  11/10/2021, 9:31 AM  ----------------------------------------------------------------------------------------------------------------------    Birgit Paulino

## 2021-12-08 ASSESSMENT — ENCOUNTER SYMPTOMS
VOICE CHANGE: 0
CHEST TIGHTNESS: 0
COUGH: 1
ABDOMINAL PAIN: 0
EYE DISCHARGE: 0
SHORTNESS OF BREATH: 1
FACIAL SWELLING: 0
SORE THROAT: 0
VOMITING: 0
EYE PAIN: 0
STRIDOR: 0
CHOKING: 0
SINUS PRESSURE: 0
SINUS PAIN: 1
WHEEZING: 1
CONSTIPATION: 0
BLOOD IN STOOL: 0
BACK PAIN: 0
DIARRHEA: 0
EYE REDNESS: 0
TROUBLE SWALLOWING: 0
RHINORRHEA: 1

## 2022-01-10 NOTE — PROGRESS NOTES
0700am Report from [facility]. Vented unresponsive. Maxed out on pressures. Sedated. [place] tpo CD  0900am DR Meggan Sterling in and updated on status. SPoke with [facility]. BP 60;s  0943am Dr Amy Mckeon in and updated on status. [facility] [place] called and updated on status  10;44am Patient extubated  10:52am No respirations heart rate. Pronounced by myself and Param Jordan RN  11:20am [facility] called and notified of time of death.   Monica GARDNER notified of death   11:25am [facility] friend called and notified of death Yazmin DUDLEY notified of urinalysis result and to take care of the Code status via Perfect serve.
